# Patient Record
Sex: FEMALE | Race: WHITE | Employment: OTHER | URBAN - METROPOLITAN AREA
[De-identification: names, ages, dates, MRNs, and addresses within clinical notes are randomized per-mention and may not be internally consistent; named-entity substitution may affect disease eponyms.]

---

## 2017-01-06 ENCOUNTER — HOSPITAL ENCOUNTER (EMERGENCY)
Age: 82
Discharge: HOME | End: 2017-01-06
Attending: FAMILY MEDICINE

## 2017-01-06 VITALS
HEIGHT: 65 IN | OXYGEN SATURATION: 97 % | HEART RATE: 71 BPM | BODY MASS INDEX: 19.16 KG/M2 | SYSTOLIC BLOOD PRESSURE: 132 MMHG | TEMPERATURE: 98.2 F | RESPIRATION RATE: 20 BRPM | WEIGHT: 115 LBS | DIASTOLIC BLOOD PRESSURE: 63 MMHG

## 2017-01-06 DIAGNOSIS — H61.23 HEARING LOSS DUE TO CERUMEN IMPACTION, BILATERAL: Primary | ICD-10-CM

## 2017-01-06 NOTE — UC PROVIDER NOTE
Patient is a 80 y.o. female presenting with other event. The history is provided by a caregiver. Other   This is a new problem. The current episode started more than 2 days ago. The problem occurs constantly. The problem has not changed since onset. Pertinent negatives include no headaches. She has tried nothing for the symptoms. Past Medical History   Diagnosis Date    [facility] ([facility] [facility]     Diabetes mellitus ([facility] [facility]     Diabetic acidosis, type II ([facility] [address] [facility]     Hypertension     Hypothyroid     Hypothyroidism         Past Surgical History   Procedure Laterality Date    Hx appendectomy      Hx cholecystectomy      Hx hysterectomy           Family History   Problem Relation Age of Onset    Diabetes Father     Cancer Mother      ovarian         Social History     Social History    Marital status:      Spouse name: N/A    Number of children: N/A    Years of education: N/A     Occupational History    Not on file. Social History Main Topics    Smoking status: Never Smoker    Smokeless tobacco: Not on file    Alcohol use No    Drug use: No    Sexual activity: Not on file     Other Topics Concern    Not on file     Social History Narrative    ** Merged History Encounter **                     ALLERGIES: Demerol [meperidine]; Morphine; Penicillins; Demerol [meperidine]; Morphine; and Pcn [penicillins]    Review of Systems   Constitutional: Negative for activity change and chills. HENT: Negative for congestion. Neurological: Negative for headaches. Vitals:    01/06/17 1537   BP: 132/63   Pulse: 71   Resp: 20   Temp: 98.2 °F (36.8 °C)   SpO2: 97%   Weight: 52.2 kg (115 lb)   Height: 5' 5\" (1.651 m)       Physical Exam   Constitutional: She is oriented to person, place, and time. She appears well-developed and well-nourished. HENT:   Cerumen in canals B/L   Cardiovascular: Normal rate and regular rhythm.     Pulmonary/Chest: Effort normal.   Neurological: She is alert and oriented to person, place, and time. Skin: Skin is warm and dry. Psychiatric: She has a normal mood and affect. Her behavior is normal. Judgment and thought content normal.   Nursing note and vitals reviewed. MDM     Differential Diagnosis; Clinical Impression; Plan:     CLINICAL IMPRESSION:  Hearing loss due to cerumen impaction, bilateral  (primary encounter diagnosis)    Plan:  1. Follow up with ENT  2.   3.   Risk of Significant Complications, Morbidity, and/or Mortality:   Presenting problems: Moderate  Diagnostic procedures: Moderate  Management options:   Moderate  Progress:   Patient progress:  Stable      Procedures

## 2017-01-06 NOTE — DISCHARGE INSTRUCTIONS
Earwax Blockage: Care Instructions  Your Care Instructions    Earwax is a natural substance that protects the ear canal. Normally, earwax drains from the ears and does not cause problems. Sometimes earwax builds up and hardens. Earwax blockage (also called cerumen impaction) can cause some loss of hearing and pain. When wax is tightly packed, you will need to have your doctor remove it. Follow-up care is a key part of your treatment and safety. Be sure to make and go to all appointments, and call your doctor if you are having problems. Its also a good idea to know your test results and keep a list of the medicines you take. How can you care for yourself at home? · Do not try to remove earwax with cotton swabs, fingers, or other objects. This can make the blockage worse and damage the eardrum. · If your doctor recommends that you try to remove earwax at home:  ¨ Soften and loosen the earwax with warm mineral oil. You also can try hydrogen peroxide mixed with an equal amount of room temperature water. Place 2 drops of the fluid, warmed to body temperature, in the ear two times a day for up to 5 days. ¨ Once the wax is loose and soft, all that is usually needed to remove it from the ear canal is a gentle, warm shower. Direct the water into the ear, then tip your head to let the earwax drain out. Dry your ear thoroughly with a hair dryer set on low. Hold the dryer several inches from your ear. ¨ If the warm mineral oil and shower do not work, use an over-the-counter wax softener followed by gentle flushing with an ear syringe each night for a week or two. Make sure the flushing solution is body temperature. Cool or hot fluids in the ear can cause dizziness. When should you call for help? Call your doctor now or seek immediate medical care if:  · Pus or blood drains from your ear. · Your ears are ringing or feel full. · You have a loss of hearing.   Watch closely for changes in your health, and be sure to contact your doctor if:  · You have pain or reduced hearing after 1 week of home treatment. · You have any new symptoms, such as nausea or balance problems. Where can you learn more? Go to http://mimi-lupe.info/. Enter T380 in the search box to learn more about \"Earwax Blockage: Care Instructions. \"  Current as of: May 27, 2016  Content Version: 11.1  © 3554-9450 [employer]. Care instructions adapted under license by [employer] (which disclaims liability or warranty for this information). If you have questions about a medical condition or this instruction, always ask your healthcare professional. [address] any warranty or liability for your use of this information.

## 2017-05-28 ENCOUNTER — APPOINTMENT (OUTPATIENT)
Dept: CT IMAGING | Age: 82
End: 2017-05-28
Attending: EMERGENCY MEDICINE
Payer: MEDICARE

## 2017-05-28 ENCOUNTER — HOSPITAL ENCOUNTER (EMERGENCY)
Age: 82
Discharge: HOME | End: 2017-05-28
Attending: EMERGENCY MEDICINE
Payer: MEDICARE

## 2017-05-28 VITALS
RESPIRATION RATE: 12 BRPM | BODY MASS INDEX: 19.14 KG/M2 | SYSTOLIC BLOOD PRESSURE: 122 MMHG | HEIGHT: 65 IN | HEART RATE: 80 BPM | DIASTOLIC BLOOD PRESSURE: 70 MMHG | OXYGEN SATURATION: 98 % | WEIGHT: 114.86 LBS | TEMPERATURE: 97.7 F

## 2017-05-28 DIAGNOSIS — R19.7 DIARRHEA, UNSPECIFIED TYPE: ICD-10-CM

## 2017-05-28 DIAGNOSIS — K52.9 COLITIS: Primary | ICD-10-CM

## 2017-05-28 LAB
ALBUMIN SERPL BCP-MCNC: 3.5 G/DL (ref 3.5–5)
ALBUMIN/GLOB SERPL: 0.8 {RATIO} (ref 1.1–2.2)
ALP SERPL-CCNC: 84 U/L (ref 45–117)
ALT SERPL-CCNC: 21 U/L (ref 12–78)
ANION GAP BLD CALC-SCNC: 7 MMOL/L (ref 5–15)
APPEARANCE UR: CLEAR
AST SERPL W P-5'-P-CCNC: 29 U/L (ref 15–37)
BACTERIA URNS QL MICRO: NEGATIVE /HPF
BASOPHILS # BLD AUTO: 0 K/UL
BASOPHILS # BLD: 0 %
BILIRUB SERPL-MCNC: 0.5 MG/DL (ref 0.2–1)
BILIRUB UR QL: NEGATIVE
BUN SERPL-MCNC: 38 MG/DL (ref 6–20)
BUN/CREAT SERPL: 32 (ref 12–20)
CALCIUM SERPL-MCNC: 9.4 MG/DL (ref 8.5–10.1)
CHLORIDE SERPL-SCNC: 101 MMOL/L (ref 97–108)
CO2 SERPL-SCNC: 29 MMOL/L (ref 21–32)
COLOR UR: ABNORMAL
CREAT SERPL-MCNC: 1.19 MG/DL (ref 0.55–1.02)
DIFFERENTIAL METHOD BLD: ABNORMAL
EOSINOPHIL # BLD: 0 K/UL
EOSINOPHIL NFR BLD: 0 %
EPITH CASTS URNS QL MICRO: ABNORMAL /LPF
ERYTHROCYTE [DISTWIDTH] IN BLOOD BY AUTOMATED COUNT: 16 % (ref 11.5–14.5)
GLOBULIN SER CALC-MCNC: 4.5 G/DL (ref 2–4)
GLUCOSE SERPL-MCNC: 140 MG/DL (ref 65–100)
GLUCOSE UR STRIP.AUTO-MCNC: NEGATIVE MG/DL
GRAN CASTS URNS QL MICRO: ABNORMAL /LPF
HCT VFR BLD AUTO: 37.7 % (ref 35–47)
HGB BLD-MCNC: 12.3 G/DL (ref 11.5–16)
HGB UR QL STRIP: ABNORMAL
HYALINE CASTS URNS QL MICRO: ABNORMAL /LPF (ref 0–5)
KETONES UR QL STRIP.AUTO: NEGATIVE MG/DL
LEUKOCYTE ESTERASE UR QL STRIP.AUTO: NEGATIVE
LYMPHOCYTES # BLD AUTO: 7 %
LYMPHOCYTES # BLD: 1.1 K/UL
MAGNESIUM SERPL-MCNC: 2.3 MG/DL (ref 1.6–2.4)
MCH RBC QN AUTO: 26.6 PG (ref 26–34)
MCHC RBC AUTO-ENTMCNC: 32.6 G/DL (ref 30–36.5)
MCV RBC AUTO: 81.4 FL (ref 80–99)
MONOCYTES # BLD: 1.3 K/UL
MONOCYTES NFR BLD AUTO: 8 %
MUCOUS THREADS URNS QL MICRO: ABNORMAL /LPF
NEUTS SEG # BLD: 13.5 K/UL
NEUTS SEG NFR BLD AUTO: 85 %
NITRITE UR QL STRIP.AUTO: NEGATIVE
PH UR STRIP: 6 [PH] (ref 5–8)
PLATELET # BLD AUTO: 282 K/UL (ref 150–400)
POTASSIUM SERPL-SCNC: 4.7 MMOL/L (ref 3.5–5.1)
POTASSIUM SERPL-SCNC: 5.6 MMOL/L (ref 3.5–5.1)
PROT SERPL-MCNC: 8 G/DL (ref 6.4–8.2)
PROT UR STRIP-MCNC: ABNORMAL MG/DL
RBC # BLD AUTO: 4.63 M/UL (ref 3.8–5.2)
RBC #/AREA URNS HPF: ABNORMAL /HPF (ref 0–5)
RBC MORPH BLD: ABNORMAL
SODIUM SERPL-SCNC: 137 MMOL/L (ref 136–145)
SP GR UR REFRACTOMETRY: 1.01 (ref 1–1.03)
UA: UC IF INDICATED,UAUC: ABNORMAL
UROBILINOGEN UR QL STRIP.AUTO: 0.2 EU/DL (ref 0.2–1)
WBC # BLD AUTO: 15.9 K/UL (ref 3.6–11)
WBC URNS QL MICRO: ABNORMAL /HPF (ref 0–4)

## 2017-05-28 PROCEDURE — 81001 URINALYSIS AUTO W/SCOPE: CPT | Performed by: EMERGENCY MEDICINE

## 2017-05-28 PROCEDURE — 74177 CT ABD & PELVIS W/CONTRAST: CPT

## 2017-05-28 PROCEDURE — 96360 HYDRATION IV INFUSION INIT: CPT

## 2017-05-28 PROCEDURE — 99285 EMERGENCY DEPT VISIT HI MDM: CPT

## 2017-05-28 PROCEDURE — 83735 ASSAY OF MAGNESIUM: CPT | Performed by: EMERGENCY MEDICINE

## 2017-05-28 PROCEDURE — 84132 ASSAY OF SERUM POTASSIUM: CPT | Performed by: EMERGENCY MEDICINE

## 2017-05-28 PROCEDURE — 36415 COLL VENOUS BLD VENIPUNCTURE: CPT | Performed by: EMERGENCY MEDICINE

## 2017-05-28 PROCEDURE — 74011636320 HC RX REV CODE- 636/320: Performed by: EMERGENCY MEDICINE

## 2017-05-28 PROCEDURE — 74011250636 HC RX REV CODE- 250/636: Performed by: EMERGENCY MEDICINE

## 2017-05-28 PROCEDURE — 85025 COMPLETE CBC W/AUTO DIFF WBC: CPT | Performed by: EMERGENCY MEDICINE

## 2017-05-28 PROCEDURE — 80053 COMPREHEN METABOLIC PANEL: CPT | Performed by: EMERGENCY MEDICINE

## 2017-05-28 RX ORDER — METRONIDAZOLE 500 MG/1
500 TABLET ORAL 3 TIMES DAILY
Qty: 30 TAB | Refills: 0 | Status: SHIPPED | OUTPATIENT
Start: 2017-05-28 | End: 2017-06-07

## 2017-05-28 RX ORDER — LEVOFLOXACIN 750 MG/1
750 TABLET ORAL DAILY
Qty: 10 TAB | Refills: 0 | Status: SHIPPED | OUTPATIENT
Start: 2017-05-28 | End: 2017-06-07

## 2017-05-28 RX ORDER — SODIUM CHLORIDE 9 MG/ML
50 INJECTION, SOLUTION INTRAVENOUS
Status: COMPLETED | OUTPATIENT
Start: 2017-05-28 | End: 2017-05-28

## 2017-05-28 RX ORDER — SODIUM CHLORIDE 0.9 % (FLUSH) 0.9 %
10 SYRINGE (ML) INJECTION
Status: COMPLETED | OUTPATIENT
Start: 2017-05-28 | End: 2017-05-28

## 2017-05-28 RX ADMIN — SODIUM CHLORIDE 50 ML/HR: 900 INJECTION, SOLUTION INTRAVENOUS at 17:32

## 2017-05-28 RX ADMIN — Medication 10 ML: at 17:32

## 2017-05-28 RX ADMIN — IOPAMIDOL 70 ML: 755 INJECTION, SOLUTION INTRAVENOUS at 17:32

## 2017-05-28 NOTE — ED NOTES
Bedside shift change report given to RN Parisa Velásquez (oncoming nurse) by JEANNETTE Francisco and Pietro Altamirano (offgoing nurse). Report included the following information SBAR, ED Summary and MAR.

## 2017-05-28 NOTE — ED NOTES
TRANSFER - OUT REPORT:    Verbal report given to Nataly(name) on Raven Zuluaga  being transferred to [facility](unit) for routine progression of care -discharge. Report consisted of patients Situation, Background, Assessment and   Recommendations(SBAR). Information from the following report(s) SBAR was reviewed with the receiving nurse. Lines:       Opportunity for questions and clarification was provided.       Patient transported with:   [facility] staff

## 2017-05-28 NOTE — ED PROVIDER NOTES
HPI Comments: Gila Macias is a 80 y.o. F with PMHx significant for Dementia / Hypothyroid / DM / HTN / Diabetic acidosis who presents via EMS from [facility] in [place], [place] [id number] [address] [place] c/o new onset diarrhea x this morning. Patient is at baseline mental status. Per EMS, living facility staff were not helpful in providing patient history. Pt denies any vomiting, fever, chills, chest pain, abdominal pain or any additional pain. Limited history due to dementia. PCP: Tanner Crum MD    Patient's history is limited secondary to hx of Dementia. The history is provided by the patient and the EMS personnel. The history is limited by the condition of the patient. Past Medical History:   Diagnosis Date    Diabetes ([facility] [address] [facility]     Diabetes mellitus ([facility] [address] [facility]     Diabetic acidosis, type II ([facility] [address] [facility]     Hypertension     Hypothyroid     Hypothyroidism        Past Surgical History:   Procedure Laterality Date    HX APPENDECTOMY      HX CHOLECYSTECTOMY      HX HYSTERECTOMY           Family History:   Problem Relation Age of Onset    Diabetes Father     Cancer Mother      ovarian        Social History     Social History    Marital status:      Spouse name: N/A    Number of children: N/A    Years of education: N/A     Occupational History    Not on file. Social History Main Topics    Smoking status: Never Smoker    Smokeless tobacco: Not on file    Alcohol use No    Drug use: No    Sexual activity: Not on file     Other Topics Concern    Not on file     Social History Narrative    ** Merged History Encounter **              ALLERGIES: Demerol [meperidine]; Morphine; Penicillins; Demerol [meperidine];  Morphine; and Pcn [penicillins]    Review of Systems   Unable to perform ROS: Dementia       Vitals:    05/28/17 1343 05/28/17 1400 05/28/17 1733   BP: 109/54 (!) 118/36 136/51   Pulse: 67 67 69   Resp: 16 17 18   Temp: 97.7 °F (36.5 °C)     SpO2: 100% 95% 99%   Weight: 52.1 kg (114 lb 13.8 oz) Height: 5' 5\" (1.651 m)              Physical Exam   Constitutional: She appears well-developed and well-nourished. No distress. HENT:   Head: Normocephalic and atraumatic. Eyes: EOM are normal. Right eye exhibits no discharge. Left eye exhibits no discharge. No scleral icterus. Neck: Normal range of motion. Neck supple. No tracheal deviation present. Cardiovascular: Normal rate, regular rhythm, normal heart sounds and intact distal pulses. Exam reveals no gallop and no friction rub. No murmur heard. Pulmonary/Chest: Effort normal and breath sounds normal. No respiratory distress. She has no wheezes. She has no rales. Abdominal: Soft. She exhibits no distension. There is no tenderness. Musculoskeletal: Normal range of motion. She exhibits no edema. Lymphadenopathy:     She has no cervical adenopathy. Neurological: She is alert. Oriented to self and place, pt thinks it is spring  Demented  Face is symmetric, moving extremities equally   Skin: Skin is warm and dry. No rash noted. Psychiatric: She has a normal mood and affect. Nursing note and vitals reviewed. MDM  Number of Diagnoses or Management Options  Colitis:   Diarrhea, unspecified type:   Diagnosis management comments:     Patient presents to ED with diarrhea. She is asymptomatic on exam but is demented. Differential includes viral syndrome, electrolyte abnormality, dehydration, c diff, colitis. Will check CBC, CMP, Mg and UA. Will send c diff if patient is able to provide sample. Amount and/or Complexity of Data Reviewed  Clinical lab tests: ordered and reviewed  Obtain history from someone other than the patient: yes (EMS)  Review and summarize past medical records: yes    Patient Progress  Patient progress: stable    ED Course       Procedures     PROGRESS NOTE:  3:39 PM  Spoke with nursing staff at [facility] in [place], [place].  Nurse reports that patients BP was in the 90's, Blood glucose was 218 and patient had a BM with mucus and spots of blood in it. WBC is 15.9 but patient is afebrile, will obtain CT a/p. Written by Concepción Quiroz. Lori Gaitan, ED Scribe, as dictated by Krupa Wallace MD    Procedure Note - Rectal Exam:   3:45 PM  Performed by: Krupa Wallace MD  Chaperoned by: Femi Crespo RN  Rectal exam performed. Brown stool was collected. No blood was found in stool. Stool was Hemoccult tested, and found to be heme Negative. The procedure took 1-15 minutes, and pt tolerated well. Written by Concepción Quiroz. Lori Gaitan, ED Scribe, as dictated by Krupa Wallace MD.    PROGRESS NOTE:  6:25 PM  Spoke with nursing staff at [facility], patient is not currently on Coumadin or Aricept. Written by Concepción Quiroz. Lori Gaitan, ED Scribe, as dictated by Krupa Wallace MD    PROGRESS NOTE:  6:30 PM  Labs unremarkable with exception of leukocytosis. CT a/p consistent with colitis. Patient is afebrile, appears well and is tolerating po. She has not had any diarrhea while in ED. Will discharge with outpatient management. Arleen Pineda MD    Patient Vitals for the past 12 hrs:   Temp Pulse Resp BP SpO2   05/28/17 1733 - 69 18 136/51 99 %   05/28/17 1400 - 67 17 (!) 118/36 95 %   05/28/17 1343 97.7 °F (36.5 °C) 67 16 109/54 100 %       LABORATORY TESTS:  Recent Results (from the past 12 hour(s))   CBC WITH AUTOMATED DIFF    Collection Time: 05/28/17  2:20 PM   Result Value Ref Range    WBC 15.9 (H) 3.6 - 11.0 K/uL    RBC 4.63 3.80 - 5.20 M/uL    HGB 12.3 11.5 - 16.0 g/dL    HCT 37.7 35.0 - 47.0 %    MCV 81.4 80.0 - 99.0 FL    MCH 26.6 26.0 - 34.0 PG    MCHC 32.6 30.0 - 36.5 g/dL    RDW 16.0 (H) 11.5 - 14.5 %    PLATELET 362 096 - 049 K/uL    NEUTROPHILS 85 %    LYMPHOCYTES 7 %    MONOCYTES 8 %    EOSINOPHILS 0 %    BASOPHILS 0 %    ABS. NEUTROPHILS 13.5 K/UL    ABS. LYMPHOCYTES 1.1 K/UL    ABS. MONOCYTES 1.3 K/UL    ABS. EOSINOPHILS 0.0 K/UL    ABS.  BASOPHILS 0.0 K/UL    RBC COMMENTS ANISOCYTOSIS  1+        DF MANUAL     METABOLIC PANEL, COMPREHENSIVE    Collection Time: 05/28/17  2:20 PM   Result Value Ref Range    Sodium 137 136 - 145 mmol/L    Potassium 5.6 (H) 3.5 - 5.1 mmol/L    Chloride 101 97 - 108 mmol/L    CO2 29 21 - 32 mmol/L    Anion gap 7 5 - 15 mmol/L    Glucose 140 (H) 65 - 100 mg/dL    BUN 38 (H) 6 - 20 MG/DL    Creatinine 1.19 (H) 0.55 - 1.02 MG/DL    BUN/Creatinine ratio 32 (H) 12 - 20      GFR est AA 51 (L) >60 ml/min/1.73m2    GFR est non-AA 42 (L) >60 ml/min/1.73m2    Calcium 9.4 8.5 - 10.1 MG/DL    Bilirubin, total 0.5 0.2 - 1.0 MG/DL    ALT (SGPT) 21 12 - 78 U/L    AST (SGOT) 29 15 - 37 U/L    Alk. phosphatase 84 45 - 117 U/L    Protein, total 8.0 6.4 - 8.2 g/dL    Albumin 3.5 3.5 - 5.0 g/dL    Globulin 4.5 (H) 2.0 - 4.0 g/dL    A-G Ratio 0.8 (L) 1.1 - 2.2     URINALYSIS W/ REFLEX CULTURE    Collection Time: 05/28/17  2:20 PM   Result Value Ref Range    Color YELLOW/STRAW      Appearance CLEAR CLEAR      Specific gravity 1.015 1.003 - 1.030      pH (UA) 6.0 5.0 - 8.0      Protein TRACE (A) NEG mg/dL    Glucose NEGATIVE  NEG mg/dL    Ketone NEGATIVE  NEG mg/dL    Bilirubin NEGATIVE  NEG      Blood TRACE (A) NEG      Urobilinogen 0.2 0.2 - 1.0 EU/dL    Nitrites NEGATIVE  NEG      Leukocyte Esterase NEGATIVE  NEG      WBC 0-4 0 - 4 /hpf    RBC 5-10 0 - 5 /hpf    Epithelial cells MODERATE (A) FEW /lpf    Bacteria NEGATIVE  NEG /hpf    UA:UC IF INDICATED CULTURE NOT INDICATED BY UA RESULT CNI      Mucus 1+ (A) NEG /lpf    Hyaline cast 5-10 0 - 5 /lpf    Granular cast 2-5 (A) NEG /lpf   MAGNESIUM    Collection Time: 05/28/17  2:20 PM   Result Value Ref Range    Magnesium 2.3 1.6 - 2.4 mg/dL   POTASSIUM    Collection Time: 05/28/17  3:49 PM   Result Value Ref Range    Potassium 4.7 3.5 - 5.1 mmol/L       IMAGING RESULTS:  CT ABD PELV W CONT   Final Result   EXAM: CT ABDOMEN PELVIS WITH CONTRAST  INDICATION: diarrhea with mucous, WBC 16  COMPARISON: CT of the chest, 1/25/2016. Limitations:  Motion limited evaluation  . TECHNIQUE:   Multislice helical CT was performed from the diaphragm to the symphysis pubis  with oral and intravenous contrast administration. Contiguous 5 mm axial images  were reconstructed and lung and soft tissue windows were generated. Coronal and  sagittal reformations were generated. CT dose reduction was achieved through use of a standardized protocol tailored  for this examination and automatic exposure control for dose modulation. Shelvy Setting FINDINGS:  INCIDENTALLY IMAGED CHEST:  Heart/vessels: Mitral annulus calcification. Calcifications in the coronary  arteries. Lungs/Pleura: Bibasilar opacity suggesting scarring/atelectasis. .  ABDOMEN:  Liver: Within normal limits. Gallbladder/Biliary: Status post cholecystectomy. Spleen: Within normal limits. Pancreas: Within normal limits. Adrenals: Left adrenal gland thickening, unchanged. Kidneys: Within normal limits. Peritoneum/Mesenteries: Within normal limits. Extraperitoneum: Within normal limits. Gastrointestinal tract: Diffuse mural thickening throughout the descending  colon. Diverticulosis in the sigmoid colon  Vascular: Calcifications in the aorta and major branch vessels. Shelvy Setting PELVIS:  Extraperitoneum: Calcifications in the pelvis suggesting phleboliths. Ureters: Within normal limits. Bladder: There is gas within the bladder. Reproductive System: Within normal limits. .  MSK:   Suture material in the right upper quadrant, anterior abdominal wall. Degenerative changes throughout the spine. Superior endplate compression of L3  . IMPRESSION  IMPRESSION: Motion limited evaluation  1. Diffuse mural thickening throughout the descending colon suggesting a  nonspecific colitis. 2. Gas within the bladder of uncertain significance, possibly iatrogenic. Recommend clinical correlation for cystitis. 3. There is compression in the superior endplate of L3 age-indeterminate.   4. Other incidental findings as above       MEDICATIONS GIVEN:  Medications   sodium chloride 0.9 % bolus infusion 1,000 mL (1,000 mL IntraVENous Incomplete 5/28/17 1609)   0.9% sodium chloride infusion (50 mL/hr IntraVENous New Bag 5/28/17 1732)   iopamidol (ISOVUE-370) 76 % injection 100 mL (70 mL IntraVENous Given 5/28/17 1732)   sodium chloride (NS) flush 10 mL (10 mL IntraVENous Given 5/28/17 1732)       IMPRESSION:  1. Colitis    2. Diarrhea, unspecified type        PLAN:  1. Current Discharge Medication List      START taking these medications    Details   levoFLOXacin (LEVAQUIN) 750 mg tablet Take 1 Tab by mouth daily for 10 days. Qty: 10 Tab, Refills: 0      metroNIDAZOLE (FLAGYL) 500 mg tablet Take 1 Tab by mouth three (3) times daily for 10 days. Qty: 30 Tab, Refills: 0           2. Follow-up Information     Follow up With Details Comments Deepak uRtherford MD In 3 days  [address] [place] [address]   [address] [place] [address]  [phone]      [facility] EMERGENCY DEPT  As needed, If symptoms worsen [address] [place], [place] [address] [facility]  [phone]        Return to ED if worse     DISCHARGE NOTE:  6:31 PM  The patient's results have been reviewed with family and/or caregiver. They verbally convey their understanding and agreement of the patient's signs, symptoms, diagnosis, treatment, and prognosis. They additionally agree to follow up as recommended in the discharge instructions or to return to the Emergency Room should the patient's condition change prior to their follow-up appointment. The family and/or caregiver verbally agrees with the care-plan and all of their questions have been answered. The discharge instructions have also been provided to the them along with educational information regarding the patient's diagnosis and a list of reasons why the patient would want to return to the ER prior to their follow-up appointment should their condition change. Written by Ju Guerin.  Lance Dye ED Scribe, as dictated by Billy Zuniga MD.        Teresa Lopez: This note is prepared by Ave Dias, acting as Scribe for Lu Armstrong MD.    Lu Armstrong MD: The scribe's documentation has been prepared under my direction and personally reviewed by me in its entirety. I confirm that the note above accurately reflects all work, treatment, procedures, and medical decision making performed by me.

## 2017-05-28 NOTE — ED NOTES
Assumed care of pt at 1520. Pt resting on stretcher in a position of comfort. Call bell within reach. ED tech at bedside for 1:1 due to pt's repeated attempts to get out of bed. Updated on plan of care.

## 2017-05-28 NOTE — DISCHARGE INSTRUCTIONS
Colitis: Care Instructions  Your Care Instructions  Colitis is the medical term for swelling (inflammation) of the intestine. It can be caused by different things, such as an infection or loss of blood flow in the intestine. Other causes are problems like Crohn's disease or ulcerative colitis. Symptoms may include fever, diarrhea that may be bloody, or belly pain. Sometimes symptoms go away without treatment. But you may need treatment or more tests, such as blood tests or a stool test. Or you may need imaging tests like a CT scan or a colonoscopy. In some cases, the doctor may want to test a sample of tissue from the intestine. This test is called a biopsy. The doctor has checked you carefully, but problems can develop later. If you notice any problems or new symptoms, get medical treatment right away. Follow-up care is a key part of your treatment and safety. Be sure to make and go to all appointments, and call your doctor if you are having problems. It's also a good idea to know your test results and keep a list of the medicines you take. How can you care for yourself at home? · Rest until you feel better. · Your doctor may recommend that you eat bland foods. These include rice, dry toast or crackers, bananas, and applesauce. · To prevent dehydration, drink plenty of fluids. Choose water and other caffeine-free clear liquids until you feel better. If you have kidney, heart, or liver disease and have to limit fluids, talk with your doctor before you increase the amount of fluids you drink. · Be safe with medicines. Take your medicines exactly as prescribed. Call your doctor if you think you are having a problem with your medicine. You will get more details on the specific medicines your doctor prescribes. When should you call for help? Call 911 anytime you think you may need emergency care. For example, call if:  · You passed out (lost consciousness).   · You vomit blood or what looks like coffee grounds. · Your stools are maroon or very bloody. Call your doctor now or seek immediate medical care if:  · You have new or worse pain. · You have a new or higher fever. · You have new or worse symptoms. · You cannot keep fluids or medicines down. Watch closely for changes in your health, and be sure to contact your doctor if:  · You do not get better as expected. Where can you learn more? Go to [employer]  Enter [id number] in the search box to learn more about \"Colitis: Care Instructions. \"   © 7353-9104 [employer], [employer]. Care instructions adapted under license by [facility] (which disclaims liability or warranty for this information). This care instruction is for use with your licensed healthcare professional. If you have questions about a medical condition or this instruction, always ask your healthcare professional. [address] any warranty or liability for your use of this information. Content Version: 14.4.500837; Current as of: November 20, 2015             Diarrhea: Care Instructions  Your Care Instructions    Diarrhea is loose, watery stools (bowel movements). The exact cause is often hard to find. Sometimes diarrhea is your body's way of getting rid of what caused an upset stomach. Viruses, food poisoning, and many medicines can cause diarrhea. Some people get diarrhea in response to emotional stress, anxiety, or certain foods. Almost everyone has diarrhea now and then. It usually isn't serious, and your stools will return to normal soon. The important thing to do is replace the fluids you have lost, so you can prevent dehydration. The doctor has checked you carefully, but problems can develop later. If you notice any problems or new symptoms, get medical treatment right away. Follow-up care is a key part of your treatment and safety. Be sure to make and go to all appointments, and call your doctor if you are having problems.  It's also a good idea to know your test results and keep a list of the medicines you take. How can you care for yourself at home? · Watch for signs of dehydration, which means your body has lost too much water. Dehydration is a serious condition and should be treated right away. Signs of dehydration are:  ¨ Increasing thirst and dry eyes and mouth. ¨ Feeling faint or lightheaded. ¨ Darker urine, and a smaller amount of urine than normal.  · To prevent dehydration, drink plenty of fluids, enough so that your urine is light yellow or clear like water. Choose water and other caffeine-free clear liquids until you feel better. If you have kidney, heart, or liver disease and have to limit fluids, talk with your doctor before you increase the amount of fluids you drink. · Begin eating small amounts of mild foods the next day, if you feel like it. ¨ Try yogurt that has live cultures of Lactobacillus. (Check the label.)  ¨ Avoid spicy foods, fruits, alcohol, and caffeine until 48 hours after all symptoms are gone. ¨ Avoid chewing gum that contains sorbitol. ¨ Avoid dairy products (except for yogurt with Lactobacillus) while you have diarrhea and for 3 days after symptoms are gone. · The doctor may recommend that you take over-the-counter medicine, such as loperamide (Imodium), if you still have diarrhea after 6 hours. Read and follow all instructions on the label. Do not use this medicine if you have bloody diarrhea, a high fever, or other signs of serious illness. Call your doctor if you think you are having a problem with your medicine. When should you call for help? Call 911 anytime you think you may need emergency care. For example, call if:  · You passed out (lost consciousness). · Your stools are maroon or very bloody. Call your doctor now or seek immediate medical care if:  · You are dizzy or lightheaded, or you feel like you may faint. · Your stools are black and look like tar, or they have streaks of blood.   · You have new or worse belly pain. · You have symptoms of dehydration, such as:  ¨ Dry eyes and a dry mouth. ¨ Passing only a little dark urine. ¨ Feeling thirstier than usual.  · You have a new or higher fever. Watch closely for changes in your health, and be sure to contact your doctor if:  · Your diarrhea is getting worse. · You see pus in the diarrhea. · You are not getting better after 2 days (48 hours). Where can you learn more? Go to http://mimi-lupe.info/. Enter W455 in the search box to learn more about \"Diarrhea: Care Instructions. \"  Current as of: May 27, 2016  Content Version: 11.2  © 6994-4296 [employer]. Care instructions adapted under license by [employer] (which disclaims liability or warranty for this information). If you have questions about a medical condition or this instruction, always ask your healthcare professional. [address] any warranty or liability for your use of this information.

## 2017-05-28 NOTE — ED NOTES
Patient arrived via EMS from assisted living for diarrhea onset this morning. Warm blanket and pillow given.  Call bell within reach

## 2017-05-28 NOTE — ED NOTES
Bedside and Verbal shift change report given to Andres Wolfe (oncoming nurse) by Cesar Monterroso (offgoing nurse). Report included the following information SBAR.

## 2017-05-29 NOTE — ED NOTES
AMS here to transport patient to nursing home. Report given to EMT at this time. Pt verbally discharged by MD Janet Falk to nursing home.

## 2017-08-25 ENCOUNTER — APPOINTMENT (OUTPATIENT)
Dept: GENERAL RADIOLOGY | Age: 82
End: 2017-08-25
Attending: PHYSICIAN ASSISTANT
Payer: MEDICARE

## 2017-08-25 ENCOUNTER — HOSPITAL ENCOUNTER (EMERGENCY)
Age: 82
Discharge: HOME | End: 2017-08-25
Attending: EMERGENCY MEDICINE | Admitting: EMERGENCY MEDICINE
Payer: MEDICARE

## 2017-08-25 ENCOUNTER — APPOINTMENT (OUTPATIENT)
Dept: GENERAL RADIOLOGY | Age: 82
End: 2017-08-25
Attending: EMERGENCY MEDICINE
Payer: MEDICARE

## 2017-08-25 VITALS
OXYGEN SATURATION: 92 % | RESPIRATION RATE: 18 BRPM | HEART RATE: 69 BPM | TEMPERATURE: 97.6 F | DIASTOLIC BLOOD PRESSURE: 50 MMHG | SYSTOLIC BLOOD PRESSURE: 113 MMHG

## 2017-08-25 DIAGNOSIS — S82.841A BIMALLEOLAR ANKLE FRACTURE, RIGHT, CLOSED, INITIAL ENCOUNTER: Primary | ICD-10-CM

## 2017-08-25 PROBLEM — S82.891A CLOSED RIGHT ANKLE FRACTURE: Status: ACTIVE | Noted: 2017-08-25

## 2017-08-25 LAB
GLUCOSE BLD STRIP.AUTO-MCNC: 207 MG/DL (ref 65–100)
SERVICE CMNT-IMP: ABNORMAL

## 2017-08-25 PROCEDURE — 82962 GLUCOSE BLOOD TEST: CPT

## 2017-08-25 PROCEDURE — 75810000053 HC SPLINT APPLICATION

## 2017-08-25 PROCEDURE — 73590 X-RAY EXAM OF LOWER LEG: CPT

## 2017-08-25 PROCEDURE — 99285 EMERGENCY DEPT VISIT HI MDM: CPT

## 2017-08-25 PROCEDURE — 73630 X-RAY EXAM OF FOOT: CPT

## 2017-08-25 PROCEDURE — 73610 X-RAY EXAM OF ANKLE: CPT

## 2017-08-25 PROCEDURE — 73600 X-RAY EXAM OF ANKLE: CPT

## 2017-08-25 PROCEDURE — 74011250637 HC RX REV CODE- 250/637: Performed by: EMERGENCY MEDICINE

## 2017-08-25 RX ORDER — HYDROCODONE BITARTRATE AND ACETAMINOPHEN 5; 325 MG/1; MG/1
1 TABLET ORAL
Status: COMPLETED | OUTPATIENT
Start: 2017-08-25 | End: 2017-08-25

## 2017-08-25 RX ORDER — HYDROCODONE BITARTRATE AND ACETAMINOPHEN 5; 325 MG/1; MG/1
1 TABLET ORAL
Qty: 20 TAB | Refills: 0 | Status: ON HOLD | OUTPATIENT
Start: 2017-08-25 | End: 2017-09-06

## 2017-08-25 RX ORDER — METFORMIN HYDROCHLORIDE 500 MG/1
500 TABLET ORAL DAILY
COMMUNITY
End: 2017-10-27

## 2017-08-25 RX ORDER — MIRTAZAPINE 15 MG/1
15 TABLET, FILM COATED ORAL
COMMUNITY

## 2017-08-25 RX ADMIN — HYDROCODONE BITARTRATE AND ACETAMINOPHEN 1 TABLET: 5; 325 TABLET ORAL at 15:20

## 2017-08-25 NOTE — CONSULTS
Orthopedic CONSULT NOTE    Subjective:     Date of Consultation:  August 25, 2017      Raven Aguayo is a 80 y.o. female who is being seen for right ankle pain after fall . Injury occurred  Today  while Pt. was at home . Workup has revealed ankle fracture non displaced . Patient's ambulatory status includes Walkers, Type: Rolling Walker and their living environment is [facility]. Pt. Denies head trauma/LOC during injury. Patient Active Problem List    Diagnosis Date Noted    Closed right ankle fracture 08/25/2017    Hypoxemia 01/25/2016    Closed olecranon fracture 01/25/2016    UTI (lower urinary tract infection) 01/25/2016    Dementia 01/25/2016    Fall 09/15/2012    Hyponatremia 09/15/2012    Diabetes mellitus ([facility] [address] 09/15/2012     Family History   Problem Relation Age of Onset    Diabetes Father     Cancer Mother      ovarian       Social History   Substance Use Topics    Smoking status: Never Smoker    Smokeless tobacco: Never Used    Alcohol use No     Past Medical History:   Diagnosis Date    Diabetes ([address]     Diabetes mellitus ([facility] [address]     Diabetic acidosis, type II ([address]     Hypertension     Hypothyroid     Hypothyroidism       Past Surgical History:   Procedure Laterality Date    HX APPENDECTOMY      HX CHOLECYSTECTOMY      HX HYSTERECTOMY        Prior to Admission medications    Medication Sig Start Date End Date Taking? Authorizing Provider   mirtazapine (REMERON) 15 mg tablet Take 15 mg by mouth nightly. Yes Suyapa Hung MD   metFORMIN (GLUCOPHAGE) 500 mg tablet Take 500 mg by mouth two (2) times daily (with meals). Yes Suyapa Hung MD   HYDROcodone-acetaminophen (NORCO) 5-325 mg per tablet Take 1 Tab by mouth every eight (8) hours as needed for Pain. Max Daily Amount: 3 Tabs. 8/25/17  Yes Han Dumont MD   amLODIPine (NORVASC) 10 mg tablet Take 10 mg by mouth daily. Yes Historical Provider   lisinopril (PRINIVIL, ZESTRIL) 10 mg tablet Take 10 mg by mouth daily.    Yes Historical Provider   memantine (NAMENDA) 10 mg tablet Take 10 mg by mouth two (2) times a day. Yes Historical Provider   multivitamin (ONE A DAY) tablet Take 1 Tab by mouth daily. Yes Historical Provider   aspirin 81 mg chewable tablet Take 1 Tab by mouth daily. 9/19/12  Yes Dawood Machado MD   levothyroxine (SYNTHROID) 50 mcg tablet Take 50 mcg by mouth Daily (before breakfast). Yes Suyapa Hung MD   cyanocobalamin (VITAMIN B-12) 1,000 mcg tablet Take 1,000 mcg by mouth every other day. Yes Suyapa Hung MD   polyethylene glycol (MIRALAX) 17 gram packet Take 17 g by mouth as needed. Yes Suyapa Hung MD   docusate sodium (COLACE) 100 mg capsule Take 100 mg by mouth every other day. Historical Provider     No current facility-administered medications for this encounter. Current Outpatient Prescriptions   Medication Sig    mirtazapine (REMERON) 15 mg tablet Take 15 mg by mouth nightly.  metFORMIN (GLUCOPHAGE) 500 mg tablet Take 500 mg by mouth two (2) times daily (with meals).  HYDROcodone-acetaminophen (NORCO) 5-325 mg per tablet Take 1 Tab by mouth every eight (8) hours as needed for Pain. Max Daily Amount: 3 Tabs.  amLODIPine (NORVASC) 10 mg tablet Take 10 mg by mouth daily.  lisinopril (PRINIVIL, ZESTRIL) 10 mg tablet Take 10 mg by mouth daily.  memantine (NAMENDA) 10 mg tablet Take 10 mg by mouth two (2) times a day.  multivitamin (ONE A DAY) tablet Take 1 Tab by mouth daily.  aspirin 81 mg chewable tablet Take 1 Tab by mouth daily.  levothyroxine (SYNTHROID) 50 mcg tablet Take 50 mcg by mouth Daily (before breakfast).  cyanocobalamin (VITAMIN B-12) 1,000 mcg tablet Take 1,000 mcg by mouth every other day.  polyethylene glycol (MIRALAX) 17 gram packet Take 17 g by mouth as needed.  docusate sodium (COLACE) 100 mg capsule Take 100 mg by mouth every other day.       Allergies   Allergen Reactions    Demerol [Meperidine] Unknown (comments)    Morphine Unknown (comments)    Penicillins Unknown (comments)    Demerol [Meperidine] Other (comments)     Unsure of reaction    Morphine Other (comments)     Unsure of reaction    Pcn [Penicillins] Other (comments)     unknown        Review of Systems:  A comprehensive review of systems was negative except for that written in the HPI. Mental Status: no dementia    Objective:     Patient Vitals for the past 8 hrs:   BP Temp Pulse Resp SpO2   17 1915 113/50 - 69 18 92 %   17 1900 127/61 - 74 19 91 %   17 1845 118/55 - 67 14 93 %   17 1830 135/59 - 65 19 92 %   17 1815 148/56 - 70 19 92 %   17 1807 135/56 - 66 17 91 %   17 1656 - - - - 94 %   17 1615 122/53 - 63 12 95 %   17 1600 131/64 - 71 21 97 %   17 1545 121/51 - 65 20 94 %   17 1530 120/44 - 64 20 95 %   17 1515 (!) 104/35 - 63 19 93 %   17 1500 108/47 - 64 18 95 %   17 1449 116/42 97.6 °F (36.4 °C) 61 12 94 %   17 1445 116/42 - - - 94 %   17 1440 109/44 - - - 94 %     Temp (24hrs), Av.6 °F (36.4 °C), Min:97.6 °F (36.4 °C), Max:97.6 °F (36.4 °C)      EXAM: alert, cooperative, no distress, oriented, normal,  Pt. Is TTP over right ankle with mild swelling. Warm well perfused. No wounds or deformity . Imaging Review: XRINDICATION:  Additional history: Ground level fall, unwitnessed. Patient complains of right  ankle and knee pain. COMPARISON: X-ray of the right tibia and fibula, 2012  . FINDINGS:  TIBIA and FIBULA: 2 views  The proximal tibia and fibula are unremarkable. There is a fracture through the  medial malleolus. There is a fracture through the distal fibular metadiaphysis. Osteopenia. [place] ANKLE: 3 views  Fracture of the medial and lateral malleoli, not significantly displaced. The  ankle joint appears congruent osteopenia. There is associated soft tissue  swelling about the ankle. [place] FOOT: 3 views  Osteopenia.  Degenerative changes in the midfoot. Hammertoes. .  IMPRESSION  IMPRESSION:  1. Fractures of the medial and lateral malleoli. The ankle joint appears  congruent. Labs:   Recent Results (from the past 24 hour(s))   GLUCOSE, POC    Collection Time: 08/25/17  4:10 PM   Result Value Ref Range    Glucose (POC) 207 (H) 65 - 100 mg/dL    Performed by Yue Finn          Impression:     Patient Active Problem List    Diagnosis Date Noted    Closed right ankle fracture 08/25/2017    Hypoxemia 01/25/2016    Closed olecranon fracture 01/25/2016    UTI (lower urinary tract infection) 01/25/2016    Dementia 01/25/2016    Fall 09/15/2012    Hyponatremia 09/15/2012    Diabetes mellitus ([facility] [address] 09/15/2012     Principal Problem:    Closed right ankle fracture (8/25/2017)        Plan:   Pt. Stable  Non-Operative Management at this time. Pt. Immobilized with sugar tong and posterior splint. Non weight bear RLEwith walker. Pain meds per primary care team.  Pt. To be  discharged to home. F/u with Dr. Roberson within 1 week by calling for an appointment at [phone]. Dr. Roberson aware and agree with above plan.       [place], [place]

## 2017-08-25 NOTE — ED NOTES
Patient arrived via ems. Was brought in from assisted living home for unwitnessed glf. C/o right ankle and knee pain. Right ankle is swollen +2 non pitting edema. Does not remember details of fall, hx of dementia. No other obvious signs of trauma.

## 2017-08-25 NOTE — ED PROVIDER NOTES
HPI Comments: Raven Perez, 80 y.o. Female with PMHx of HTN, DM and hypothyroidism presents via EMS to the ED with cc of R foot, ankle and knee pain after unwitnessed GLF PTA. She now reports right foot, right ankle and right knee pain. Pt does not remember the fall. Daughter notes that the pt was in her room and was found down on the floor. Daughter notes that the pt did not mention any head injuries and that she was thoroughly looked over by EMS to make sure. Daughter notes that she had multiple GLF's in the past with some linked to TIA's. She takes ASA 81 mg and metformin daily. Daughter notes that the pt has lost weight over the last year with no change in weakness. Daughter reports morphine, penicillin and Demerol allergies. Pt is ambulatory with a walker at baseline. She denies any hip pain, shoulder pain, LLE pain, or HA. PCP: PROVIDER UNKNOWN    Social history significant for: - Tobacco, - EtOH, - Illicit Drug Use    There are no other complaints, changes, or physical findings at this time. Written by HERNESTO Locke, as dictated by Ghulam Lau MD.      The history is provided by the patient. No  was used. Past Medical History:   Diagnosis Date    Diabetes ([address]     Diabetes mellitus ([address]     Diabetic acidosis, type II ([address]     Hypertension     Hypothyroid     Hypothyroidism        Past Surgical History:   Procedure Laterality Date    HX APPENDECTOMY      HX CHOLECYSTECTOMY      HX HYSTERECTOMY           Family History:   Problem Relation Age of Onset    Diabetes Father     Cancer Mother      ovarian        Social History     Social History    Marital status:      Spouse name: N/A    Number of children: N/A    Years of education: N/A     Occupational History    Not on file.      Social History Main Topics    Smoking status: Never Smoker    Smokeless tobacco: Never Used    Alcohol use No    Drug use: No    Sexual activity: Not on file     Other Topics Concern    Not on file     Social History Narrative    ** Merged History Encounter **              ALLERGIES: Demerol [meperidine]; Morphine; Penicillins; Demerol [meperidine]; Morphine; and Pcn [penicillins]    Review of Systems   Constitutional: Negative. Negative for chills and fever. HENT: Negative. Negative for congestion and rhinorrhea. Respiratory: Negative. Negative for cough, chest tightness and wheezing. Cardiovascular: Negative. Negative for chest pain and palpitations. Gastrointestinal: Negative. Negative for abdominal pain, constipation, nausea and vomiting. Endocrine: Negative. Genitourinary: Negative. Negative for decreased urine volume, flank pain, hematuria and pelvic pain. Musculoskeletal: Positive for myalgias (RLE from foot to knee). Negative for back pain and neck pain. Skin: Negative. Negative for color change, pallor and rash. Neurological: Negative. Negative for dizziness, seizures, weakness, numbness and headaches. Hematological: Negative. Negative for adenopathy. Psychiatric/Behavioral: Negative. All other systems reviewed and are negative. Patient Vitals for the past 12 hrs:   Temp Pulse Resp BP SpO2   08/25/17 1915 - 69 18 113/50 92 %   08/25/17 1900 - 74 19 127/61 91 %   08/25/17 1845 - 67 14 118/55 93 %   08/25/17 1830 - 65 19 135/59 92 %   08/25/17 1815 - 70 19 148/56 92 %   08/25/17 1807 - 66 17 135/56 91 %   08/25/17 1656 - - - - 94 %   08/25/17 1615 - 63 12 122/53 95 %   08/25/17 1600 - 71 21 131/64 97 %   08/25/17 1545 - 65 20 121/51 94 %   08/25/17 1530 - 64 20 120/44 95 %   08/25/17 1515 - 63 19 (!) 104/35 93 %   08/25/17 1500 - 64 18 108/47 95 %   08/25/17 1449 97.6 °F (36.4 °C) 61 12 116/42 94 %   08/25/17 1445 - - - 116/42 94 %   08/25/17 1440 - - - 109/44 94 %       Physical Exam   Constitutional: She is oriented to person, place, and time. She appears well-developed and well-nourished. No distress.    HENT: Head: Normocephalic and atraumatic. Head is without Nelson's sign, without abrasion and without contusion. Right Ear: No hemotympanum. Left Ear: No hemotympanum. Mouth/Throat: Uvula is midline, oropharynx is clear and moist and mucous membranes are normal. No oropharyngeal exudate. Eyes: Conjunctivae are normal. Pupils are equal, round, and reactive to light. Right eye exhibits no discharge. Left eye exhibits no discharge. No scleral icterus. Neck: Normal range of motion. Neck supple. No JVD present. No spinous process tenderness and no muscular tenderness present. No rigidity. No erythema and normal range of motion present. Cardiovascular: Normal rate, regular rhythm, normal heart sounds and intact distal pulses. Exam reveals no gallop and no friction rub. No murmur heard. Pulses:       Dorsalis pedis pulses are 2+ on the right side, and 2+ on the left side. Posterior tibial pulses are 2+ on the right side, and 2+ on the left side. No compartment signs in right leg   Pulmonary/Chest: Effort normal and breath sounds normal. No stridor. No respiratory distress. She has no wheezes. She has no rales. She exhibits no tenderness. Abdominal: Soft. Bowel sounds are normal. She exhibits no distension and no mass. There is no tenderness. There is no rebound and no guarding. Musculoskeletal: She exhibits edema and tenderness. Right shoulder: Normal.        Left shoulder: Normal.        Right elbow: Normal.       Left elbow: Normal.        Right ankle: She exhibits decreased range of motion and swelling. Tenderness. Lateral malleolus and proximal fibula tenderness found. Achilles tendon normal.        Lumbar back: Normal.        Right lower leg: She exhibits tenderness. She exhibits no bony tenderness, no swelling, no edema, no deformity and no laceration. Right foot: There is tenderness and bony tenderness. Neurological: She is alert and oriented to person, place, and time.  She displays normal reflexes. No cranial nerve deficit. She exhibits normal muscle tone. Coordination normal.   Skin: Skin is warm. No rash noted. She is not diaphoretic. No pallor. Nursing note and vitals reviewed. MDM  Number of Diagnoses or Management Options  Bimalleolar ankle fracture, right, closed, initial encounter:   Diagnosis management comments: DDx: mechanical fall, closed head injury, leg fx, pelvic fx, compartment syndrome    Unwitnessed fall, but suspect mechanical in nature as she has had falls before. No signs of head injury. Clinically has pain in R lower leg and X-Ray confirms stable tib-fib fx. Seen by ortho, splinted. Will f/u in their office. Amount and/or Complexity of Data Reviewed  Clinical lab tests: ordered and reviewed  Tests in the radiology section of CPT®: ordered and reviewed  Obtain history from someone other than the patient: yes  Review and summarize past medical records: yes  Discuss the patient with other providers: yes    Risk of Complications, Morbidity, and/or Mortality  Presenting problems: moderate  Diagnostic procedures: moderate  Management options: moderate    Patient Progress  Patient progress: stable    ED Course       Procedures     3:10 PM  Daughter, who is power of , did not want any extensive testing performed other than XRs. Written by HERNESTO Medina, as dictated by Armani Edmonds MD.    CONSULT NOTE:   6:00 PM  Armani Edmonds MD spoke with Montserrat Landon PA-C,   Specialty: ortho  Discussed pt's hx, disposition, and available diagnostic and imaging results. Reviewed care plans. Consultant agrees with plans as outlined. Nancy Bo will evaluate the pt.    Written by HERNESTO Medina, as dictated by Armani Edmonds MD.          LABORATORY TESTS:  Recent Results (from the past 12 hour(s))   GLUCOSE, POC    Collection Time: 08/25/17  4:10 PM   Result Value Ref Range    Glucose (POC) 207 (H) 65 - 100 mg/dL    Performed by Nancy Dixon        IMAGING RESULTS:  XR TIB/FIB RT   Final Result   XR TIB/FIB RT, XR ANKLE RT MIN 3 V, XR FOOT RT MIN 3 V ,8/25/2017 5:19 PM  INDICATION:  Additional history: Ground level fall, unwitnessed. Patient complains of right  ankle and knee pain. COMPARISON: X-ray of the right tibia and fibula, 9/14/2012  . FINDINGS:  TIBIA and FIBULA: 2 views  The proximal tibia and fibula are unremarkable. There is a fracture through the  medial malleolus. There is a fracture through the distal fibular metadiaphysis. Osteopenia. Lorrain Passey ANKLE: 3 views  Fracture of the medial and lateral malleoli, not significantly displaced. The  ankle joint appears congruent osteopenia. There is associated soft tissue  swelling about the ankle. Lorrain Passey FOOT: 3 views  Osteopenia. Degenerative changes in the midfoot. Hammertoes. .  IMPRESSION  IMPRESSION:  1. Fractures of the medial and lateral malleoli. The ankle joint appears  congruent. XR ANKLE RT MIN 3 V   Final Result   XR TIB/FIB RT, XR ANKLE RT MIN 3 V, XR FOOT RT MIN 3 V ,8/25/2017 5:19 PM  INDICATION:  Additional history: Ground level fall, unwitnessed. Patient complains of right  ankle and knee pain. COMPARISON: X-ray of the right tibia and fibula, 9/14/2012  . FINDINGS:  TIBIA and FIBULA: 2 views  The proximal tibia and fibula are unremarkable. There is a fracture through the  medial malleolus. There is a fracture through the distal fibular metadiaphysis. Osteopenia. Lorrain Passey ANKLE: 3 views  Fracture of the medial and lateral malleoli, not significantly displaced. The  ankle joint appears congruent osteopenia. There is associated soft tissue  swelling about the ankle. Lorrain Passey FOOT: 3 views  Osteopenia. Degenerative changes in the midfoot. Hammertoes. .  IMPRESSION  IMPRESSION:  1. Fractures of the medial and lateral malleoli. The ankle joint appears  congruent.    XR FOOT RT MIN 3 V   Final Result   XR TIB/FIB RT, XR ANKLE RT MIN 3 V, XR FOOT RT MIN 3 V ,8/25/2017 5:19 PM  INDICATION:  Additional history: Ground level fall, unwitnessed. Patient complains of right  ankle and knee pain. COMPARISON: X-ray of the right tibia and fibula, 9/14/2012  . FINDINGS:  TIBIA and FIBULA: 2 views  The proximal tibia and fibula are unremarkable. There is a fracture through the  medial malleolus. There is a fracture through the distal fibular metadiaphysis. Osteopenia. Mary Peaks ANKLE: 3 views  Fracture of the medial and lateral malleoli, not significantly displaced. The  ankle joint appears congruent osteopenia. There is associated soft tissue  swelling about the ankle. Mary Peaks FOOT: 3 views  Osteopenia. Degenerative changes in the midfoot. Hammertoes. .  IMPRESSION  IMPRESSION:  1. Fractures of the medial and lateral malleoli. The ankle joint appears  congruent. MEDICATIONS GIVEN:  Medications   HYDROcodone-acetaminophen (NORCO) 5-325 mg per tablet 1 Tab (1 Tab Oral Given 8/25/17 1520)       IMPRESSION:  1. Bimalleolar ankle fracture, right, closed, initial encounter        PLAN:  1. Discharge Medication List as of 8/25/2017  7:22 PM      START taking these medications    Details   HYDROcodone-acetaminophen (NORCO) 5-325 mg per tablet Take 1 Tab by mouth every eight (8) hours as needed for Pain. Max Daily Amount: 3 Tabs., Print, Disp-20 Tab, R-0         CONTINUE these medications which have NOT CHANGED    Details   mirtazapine (REMERON) 15 mg tablet Take 15 mg by mouth nightly., Historical Med      metFORMIN (GLUCOPHAGE) 500 mg tablet Take 500 mg by mouth two (2) times daily (with meals). , Historical Med      amLODIPine (NORVASC) 10 mg tablet Take 10 mg by mouth daily. , Historical Med      lisinopril (PRINIVIL, ZESTRIL) 10 mg tablet Take 10 mg by mouth daily. , Historical Med      memantine (NAMENDA) 10 mg tablet Take 10 mg by mouth two (2) times a day., Historical Med      multivitamin (ONE A DAY) tablet Take 1 Tab by mouth daily. , Historical Med      aspirin 81 mg chewable tablet Take 1 Tab by mouth daily. OTC, 81 mg      levothyroxine (SYNTHROID) 50 mcg tablet Take 50 mcg by mouth Daily (before breakfast). Historical Med, 50 mcg      cyanocobalamin (VITAMIN B-12) 1,000 mcg tablet Take 1,000 mcg by mouth every other day. Historical Med, 1,000 mcg      polyethylene glycol (MIRALAX) 17 gram packet Take 17 g by mouth as needed. Historical Med, 17 g      docusate sodium (COLACE) 100 mg capsule Take 100 mg by mouth every other day., Historical Med           2. Follow-up Information     Follow up With Details Comments Zuleyka Flores MD Call in 2 days  [facility] [place] [address] [place] [address] 83.  [phone]      [facility] EMERGENCY DEPT  If symptoms worsen [address]  [phone]        Return to ED if worse       Discharge Note:  7:26 PM  The pt is ready for discharge. The pt's signs, symptoms, diagnosis, and discharge instructions have been discussed and pt has conveyed their understanding. The pt is to follow up as recommended or return to ER should their symptoms worsen. Plan has been discussed and pt is in agreement. This note is prepared by Teddy Gastelum, acting as a Scribe for Annamaria Mendez MD.    Annamaria Mendez MD: The scribe's documentation has been prepared under my direction and personally reviewed by me in its entirety. I confirm that the notes above accurately reflects all work, treatment, procedures, and medical decision making performed by me.

## 2017-08-25 NOTE — ED NOTES
Assumed care of patient from [facility]. Patient resting comfortably in no apparent distress. On monitor x 3. Call bell within reach. Plan of care discussed.

## 2017-08-26 NOTE — ED NOTES
Discharge instructions reviewed with pt and copy given along with RX by Dr Ирина Peña. All questions answered at this time. Pt discharged via wheelchair home with daughter.

## 2017-09-03 ENCOUNTER — APPOINTMENT (OUTPATIENT)
Dept: GENERAL RADIOLOGY | Age: 82
DRG: 312 | End: 2017-09-03
Attending: EMERGENCY MEDICINE
Payer: MEDICARE

## 2017-09-03 ENCOUNTER — APPOINTMENT (OUTPATIENT)
Dept: CT IMAGING | Age: 82
DRG: 312 | End: 2017-09-03
Attending: EMERGENCY MEDICINE
Payer: MEDICARE

## 2017-09-03 ENCOUNTER — HOSPITAL ENCOUNTER (INPATIENT)
Age: 82
LOS: 4 days | Discharge: HOME HEALTH | DRG: 312 | End: 2017-09-07
Attending: EMERGENCY MEDICINE | Admitting: INTERNAL MEDICINE
Payer: MEDICARE

## 2017-09-03 DIAGNOSIS — E87.20 LACTIC ACIDOSIS: ICD-10-CM

## 2017-09-03 DIAGNOSIS — N17.9 AKI (ACUTE KIDNEY INJURY) (HCC): ICD-10-CM

## 2017-09-03 DIAGNOSIS — E86.0 DEHYDRATION: ICD-10-CM

## 2017-09-03 DIAGNOSIS — R55 SYNCOPE AND COLLAPSE: Primary | ICD-10-CM

## 2017-09-03 LAB
ALBUMIN SERPL-MCNC: 2.8 G/DL (ref 3.5–5)
ALBUMIN/GLOB SERPL: 0.7 {RATIO} (ref 1.1–2.2)
ALP SERPL-CCNC: 145 U/L (ref 45–117)
ALT SERPL-CCNC: 22 U/L (ref 12–78)
ANION GAP SERPL CALC-SCNC: 7 MMOL/L (ref 5–15)
APPEARANCE UR: CLEAR
AST SERPL-CCNC: 15 U/L (ref 15–37)
BACTERIA URNS QL MICRO: NEGATIVE /HPF
BASOPHILS # BLD: 0 K/UL (ref 0–0.1)
BASOPHILS NFR BLD: 0 % (ref 0–1)
BILIRUB SERPL-MCNC: 0.4 MG/DL (ref 0.2–1)
BILIRUB UR QL: NEGATIVE
BUN SERPL-MCNC: 31 MG/DL (ref 6–20)
BUN/CREAT SERPL: 21 (ref 12–20)
CALCIUM SERPL-MCNC: 8.3 MG/DL (ref 8.5–10.1)
CHLORIDE SERPL-SCNC: 102 MMOL/L (ref 97–108)
CK SERPL-CCNC: 95 U/L (ref 26–192)
CO2 SERPL-SCNC: 27 MMOL/L (ref 21–32)
COLOR UR: ABNORMAL
CREAT SERPL-MCNC: 1.5 MG/DL (ref 0.55–1.02)
EOSINOPHIL # BLD: 0.1 K/UL (ref 0–0.4)
EOSINOPHIL NFR BLD: 1 % (ref 0–7)
EPITH CASTS URNS QL MICRO: ABNORMAL /LPF
ERYTHROCYTE [DISTWIDTH] IN BLOOD BY AUTOMATED COUNT: 15.7 % (ref 11.5–14.5)
GLOBULIN SER CALC-MCNC: 4.3 G/DL (ref 2–4)
GLUCOSE SERPL-MCNC: 247 MG/DL (ref 65–100)
GLUCOSE UR STRIP.AUTO-MCNC: NEGATIVE MG/DL
HCT VFR BLD AUTO: 34.1 % (ref 35–47)
HGB BLD-MCNC: 11 G/DL (ref 11.5–16)
HGB UR QL STRIP: NEGATIVE
KETONES UR QL STRIP.AUTO: NEGATIVE MG/DL
LACTATE SERPL-SCNC: 2.2 MMOL/L (ref 0.4–2)
LACTATE SERPL-SCNC: 2.9 MMOL/L (ref 0.4–2)
LEUKOCYTE ESTERASE UR QL STRIP.AUTO: NEGATIVE
LYMPHOCYTES # BLD: 1.1 K/UL (ref 0.8–3.5)
LYMPHOCYTES NFR BLD: 10 % (ref 12–49)
MCH RBC QN AUTO: 27.3 PG (ref 26–34)
MCHC RBC AUTO-ENTMCNC: 32.3 G/DL (ref 30–36.5)
MCV RBC AUTO: 84.6 FL (ref 80–99)
MONOCYTES # BLD: 0.6 K/UL (ref 0–1)
MONOCYTES NFR BLD: 5 % (ref 5–13)
NEUTS SEG # BLD: 9.9 K/UL (ref 1.8–8)
NEUTS SEG NFR BLD: 84 % (ref 32–75)
NITRITE UR QL STRIP.AUTO: NEGATIVE
PH UR STRIP: 6 [PH] (ref 5–8)
PLATELET # BLD AUTO: 310 K/UL (ref 150–400)
POTASSIUM SERPL-SCNC: 5.1 MMOL/L (ref 3.5–5.1)
PROT SERPL-MCNC: 7.1 G/DL (ref 6.4–8.2)
PROT UR STRIP-MCNC: NEGATIVE MG/DL
RBC # BLD AUTO: 4.03 M/UL (ref 3.8–5.2)
RBC #/AREA URNS HPF: ABNORMAL /HPF (ref 0–5)
SODIUM SERPL-SCNC: 136 MMOL/L (ref 136–145)
SP GR UR REFRACTOMETRY: 1.02 (ref 1–1.03)
TROPONIN I SERPL-MCNC: <0.04 NG/ML
UA: UC IF INDICATED,UAUC: ABNORMAL
UROBILINOGEN UR QL STRIP.AUTO: 1 EU/DL (ref 0.2–1)
WBC # BLD AUTO: 11.7 K/UL (ref 3.6–11)
WBC URNS QL MICRO: ABNORMAL /HPF (ref 0–4)

## 2017-09-03 PROCEDURE — 74011250637 HC RX REV CODE- 250/637: Performed by: EMERGENCY MEDICINE

## 2017-09-03 PROCEDURE — 96361 HYDRATE IV INFUSION ADD-ON: CPT

## 2017-09-03 PROCEDURE — 96368 THER/DIAG CONCURRENT INF: CPT

## 2017-09-03 PROCEDURE — 74176 CT ABD & PELVIS W/O CONTRAST: CPT

## 2017-09-03 PROCEDURE — 65270000029 HC RM PRIVATE

## 2017-09-03 PROCEDURE — 65660000000 HC RM CCU STEPDOWN

## 2017-09-03 PROCEDURE — 93005 ELECTROCARDIOGRAM TRACING: CPT

## 2017-09-03 PROCEDURE — 74011250636 HC RX REV CODE- 250/636: Performed by: INTERNAL MEDICINE

## 2017-09-03 PROCEDURE — 74011000250 HC RX REV CODE- 250: Performed by: EMERGENCY MEDICINE

## 2017-09-03 PROCEDURE — 71010 XR CHEST PORT: CPT

## 2017-09-03 PROCEDURE — 85025 COMPLETE CBC W/AUTO DIFF WBC: CPT | Performed by: EMERGENCY MEDICINE

## 2017-09-03 PROCEDURE — 83605 ASSAY OF LACTIC ACID: CPT | Performed by: EMERGENCY MEDICINE

## 2017-09-03 PROCEDURE — 81001 URINALYSIS AUTO W/SCOPE: CPT | Performed by: EMERGENCY MEDICINE

## 2017-09-03 PROCEDURE — 96365 THER/PROPH/DIAG IV INF INIT: CPT

## 2017-09-03 PROCEDURE — 82550 ASSAY OF CK (CPK): CPT | Performed by: EMERGENCY MEDICINE

## 2017-09-03 PROCEDURE — 74011250637 HC RX REV CODE- 250/637: Performed by: INTERNAL MEDICINE

## 2017-09-03 PROCEDURE — 87040 BLOOD CULTURE FOR BACTERIA: CPT | Performed by: EMERGENCY MEDICINE

## 2017-09-03 PROCEDURE — 80053 COMPREHEN METABOLIC PANEL: CPT | Performed by: EMERGENCY MEDICINE

## 2017-09-03 PROCEDURE — 99285 EMERGENCY DEPT VISIT HI MDM: CPT

## 2017-09-03 PROCEDURE — 74011250636 HC RX REV CODE- 250/636: Performed by: EMERGENCY MEDICINE

## 2017-09-03 PROCEDURE — 84484 ASSAY OF TROPONIN QUANT: CPT | Performed by: EMERGENCY MEDICINE

## 2017-09-03 PROCEDURE — 36415 COLL VENOUS BLD VENIPUNCTURE: CPT | Performed by: EMERGENCY MEDICINE

## 2017-09-03 RX ORDER — ENOXAPARIN SODIUM 100 MG/ML
30 INJECTION SUBCUTANEOUS EVERY 24 HOURS
Status: DISCONTINUED | OUTPATIENT
Start: 2017-09-03 | End: 2017-09-03 | Stop reason: DRUGHIGH

## 2017-09-03 RX ORDER — LORAZEPAM 2 MG/ML
1 INJECTION INTRAMUSCULAR
Status: DISCONTINUED | OUTPATIENT
Start: 2017-09-03 | End: 2017-09-07 | Stop reason: HOSPADM

## 2017-09-03 RX ORDER — ONDANSETRON 2 MG/ML
4 INJECTION INTRAMUSCULAR; INTRAVENOUS
Status: DISCONTINUED | OUTPATIENT
Start: 2017-09-03 | End: 2017-09-07 | Stop reason: HOSPADM

## 2017-09-03 RX ORDER — LEVOFLOXACIN 5 MG/ML
750 INJECTION, SOLUTION INTRAVENOUS
Status: COMPLETED | OUTPATIENT
Start: 2017-09-03 | End: 2017-09-03

## 2017-09-03 RX ORDER — ACETAMINOPHEN 325 MG/1
650 TABLET ORAL
Status: COMPLETED | OUTPATIENT
Start: 2017-09-03 | End: 2017-09-03

## 2017-09-03 RX ORDER — LEVOTHYROXINE SODIUM 50 UG/1
50 TABLET ORAL
Status: DISCONTINUED | OUTPATIENT
Start: 2017-09-04 | End: 2017-09-07 | Stop reason: HOSPADM

## 2017-09-03 RX ORDER — MEMANTINE HYDROCHLORIDE 10 MG/1
10 TABLET ORAL 2 TIMES DAILY
Status: DISCONTINUED | OUTPATIENT
Start: 2017-09-04 | End: 2017-09-06

## 2017-09-03 RX ORDER — LEVOFLOXACIN 5 MG/ML
500 INJECTION, SOLUTION INTRAVENOUS
Status: DISCONTINUED | OUTPATIENT
Start: 2017-09-03 | End: 2017-09-03 | Stop reason: DRUGHIGH

## 2017-09-03 RX ORDER — GUAIFENESIN 100 MG/5ML
81 LIQUID (ML) ORAL DAILY
Status: DISCONTINUED | OUTPATIENT
Start: 2017-09-04 | End: 2017-09-07 | Stop reason: HOSPADM

## 2017-09-03 RX ORDER — METRONIDAZOLE 500 MG/100ML
500 INJECTION, SOLUTION INTRAVENOUS
Status: COMPLETED | OUTPATIENT
Start: 2017-09-03 | End: 2017-09-03

## 2017-09-03 RX ORDER — RISPERIDONE 0.25 MG/1
0.25 TABLET, FILM COATED ORAL 2 TIMES DAILY
Status: DISCONTINUED | OUTPATIENT
Start: 2017-09-04 | End: 2017-09-07 | Stop reason: HOSPADM

## 2017-09-03 RX ORDER — ACETAMINOPHEN 325 MG/1
650 TABLET ORAL
Status: DISCONTINUED | OUTPATIENT
Start: 2017-09-03 | End: 2017-09-07 | Stop reason: HOSPADM

## 2017-09-03 RX ORDER — SODIUM CHLORIDE 9 MG/ML
100 INJECTION, SOLUTION INTRAVENOUS CONTINUOUS
Status: DISCONTINUED | OUTPATIENT
Start: 2017-09-03 | End: 2017-09-07 | Stop reason: HOSPADM

## 2017-09-03 RX ORDER — POLYETHYLENE GLYCOL 3350 17 G/17G
17 POWDER, FOR SOLUTION ORAL 3 TIMES DAILY
Status: DISCONTINUED | OUTPATIENT
Start: 2017-09-04 | End: 2017-09-04 | Stop reason: SDUPTHER

## 2017-09-03 RX ADMIN — ACETAMINOPHEN 650 MG: 325 TABLET ORAL at 17:35

## 2017-09-03 RX ADMIN — LEVOFLOXACIN 750 MG: 5 INJECTION, SOLUTION INTRAVENOUS at 17:07

## 2017-09-03 RX ADMIN — ENOXAPARIN SODIUM 25 MG: 60 INJECTION SUBCUTANEOUS at 21:13

## 2017-09-03 RX ADMIN — SODIUM CHLORIDE 500 ML: 900 INJECTION, SOLUTION INTRAVENOUS at 18:14

## 2017-09-03 RX ADMIN — SODIUM CHLORIDE 100 ML/HR: 900 INJECTION, SOLUTION INTRAVENOUS at 21:03

## 2017-09-03 RX ADMIN — LORAZEPAM 1 MG: 2 INJECTION INTRAMUSCULAR; INTRAVENOUS at 23:02

## 2017-09-03 RX ADMIN — SODIUM CHLORIDE 500 ML: 900 INJECTION, SOLUTION INTRAVENOUS at 15:53

## 2017-09-03 RX ADMIN — METRONIDAZOLE 500 MG: 500 INJECTION, SOLUTION INTRAVENOUS at 17:07

## 2017-09-03 NOTE — IP AVS SNAPSHOT
[address] [facility] 
[phone] Patient: Raven Villasenor MRN: [id number] XRO:[id number] You are allergic to the following Allergen Reactions Demerol (Meperidine) Unknown (comments) Morphine Unknown (comments) Penicillins Unknown (comments) Demerol (Meperidine) Other (comments) Unsure of reaction Morphine Other (comments) Unsure of reaction Pcn (Penicillins) Other (comments)  
 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  
  
CONTINUE these medications which have CHANGED Dose & Instructions Dispensing Information Comments Morning Noon Evening Bedtime  
 polyethylene glycol 17 gram packet Commonly known as:  Bulmaro Winchester What changed:  when to take this Your last dose was: Your next dose is:    
   
   
 Dose:  17 g Take 1 Packet by mouth two (2) times a day. Quantity:  30 Each Refills:  0 CONTINUE these medications which have NOT CHANGED Dose & Instructions Dispensing Information Comments Morning Noon Evening Bedtime  
 acetaminophen 325 mg tablet Commonly known as:  TYLENOL Your last dose was: Your next dose is:    
   
   
 Dose:  325 mg Take 325 mg by mouth every six (6) hours as needed for Pain or Fever. Refills:  0  
     
   
   
   
  
 amLODIPine 10 mg tablet Commonly known as:  Bell Nobles Your last dose was: Your next dose is:    
   
   
 Dose:  10 mg Take 10 mg by mouth daily. Refills:  0  
     
   
   
   
  
 aspirin 81 mg chewable tablet Your last dose was: Your next dose is:    
   
   
 Dose:  81 mg Take 1 Tab by mouth daily. Refills:  0  
     
   
   
   
  
 docusate sodium 100 mg capsule Commonly known as:  Taya Robbins Your last dose was: Your next dose is:    
   
   
 Dose:  100 mg Take 100 mg by mouth daily. Refills:  0  
     
   
   
   
  
 levothyroxine 50 mcg tablet Commonly known as:  SYNTHROID Your last dose was: Your next dose is:    
   
   
 Dose:  50 mcg Take 50 mcg by mouth Daily (before breakfast). Refills:  0  
     
   
   
   
  
 lisinopril 10 mg tablet Commonly known as:  Judie Jefferson Your last dose was: Your next dose is:    
   
   
 Dose:  10 mg Take 10 mg by mouth daily. Refills:  0  
     
   
   
   
  
 metFORMIN 500 mg tablet Commonly known as:  GLUCOPHAGE Your last dose was: Your next dose is:    
   
   
 Dose:  500 mg Take 500 mg by mouth daily. Refills:  0 Mineral Oil Drop Your last dose was: Your next dose is:    
   
   
 Dose:  2 Drop  
2 Drops by Otic route every seven (7) days. Refills:  0  
     
   
   
   
  
 OYSTER SHELL CALCIUM 500 PO Your last dose was: Your next dose is:    
   
   
 Dose:  500 mg Take 500 mg by mouth daily. Refills:  0 REMERON 15 mg tablet Generic drug:  mirtazapine Your last dose was: Your next dose is:    
   
   
 Dose:  15 mg Take 15 mg by mouth nightly. Indications: appetite Refills:  0  
     
   
   
   
  
 risperiDONE 0.25 mg tablet Commonly known as:  RisperDAL Your last dose was: Your next dose is:    
   
   
 Dose:  0.25 mg Take 0.25 mg by mouth two (2) times a day. Indications: mood swings Refills:  0  
     
   
   
   
  
 VITAMIN B-12 1,000 mcg tablet Generic drug:  cyanocobalamin Your last dose was: Your next dose is:    
   
   
 Dose:  1000 mcg Take 1,000 mcg by mouth daily. Refills:  0  
     
   
   
   
  
 VITAMIN D2 50,000 unit capsule Generic drug:  ergocalciferol Your last dose was: Your next dose is:    
   
   
 Dose:  22266 Units Take 50,000 Units by mouth every seven (7) days. Refills:  0  
     
   
   
   
  
 vitamin e 1,000 unit capsule Commonly known as:  E GEMS Your last dose was: Your next dose is:    
   
   
 Dose:  1000 Units Take 1,000 Units by mouth daily. Refills:  0 Where to Get Your Medications Information on where to get these meds will be given to you by the nurse or doctor. ! Ask your nurse or doctor about these medications  
  polyethylene glycol 17 gram packet Discharge Instructions None Discharge Orders None ACO Transitions of Care Introducing [employer] [address] Tessa Riley offers a voluntary care coordination program to provide high quality service and care to [facility] fee-for-service beneficiaries. Tiffanie Conner was designed to help you enhance your health and well-being through the following services: ? Transitions of Care  support for individuals who are transitioning from one care setting to another (example: Hospital to home). ? Chronic and Complex Care Coordination  support for individuals and caregivers of those with serious or chronic illnesses or with more than one chronic (ongoing) condition and those who take a number of different medications. If you meet specific medical criteria, a [address] [place] [address] Rd may call you directly to coordinate your care with your primary care physician and your other care providers. For questions about the [facility] programs, please, contact your physicians office. For general questions or additional information about Accountable Care Organizations: 
Please visit www.medicare.gov/acos. html or call 1-800-MEDICARE (9-359.845.7510) TTY users should call 3-272.150.5146. Introducing [facility]! Willadean Saint introduces [employer] patient portal. Now you can access parts of your medical record, email your doctor's office, and request medication refills online. 1. In your internet browser, go to [email or web address] 2. Click on the First Time User? Click Here link in the Sign In box. You will see the New Member Sign Up page. 3. Enter your [employer] Access Code exactly as it appears below. You will not need to use this code after youve completed the sign-up process. If you do not sign up before the expiration date, you must request a new code. · [employer] Access Code: [email or web address] Expires: 12/2/2017  3:35 PM 
 
4.  Enter the last four digits of your Social Security Number (xxxx) and Date of Birth (mm/dd/yyyy) as indicated and click Submit. You will be taken to the next sign-up page. 5. Create a [employer] ID. This will be your [employer] login ID and cannot be changed, so think of one that is secure and easy to remember. 6. Create a [employer] password. You can change your password at any time. 7. Enter your Password Reset Question and Answer. This can be used at a later time if you forget your password. 8. Enter your e-mail address. You will receive e-mail notification when new information is available in [address] 9. Click Sign Up. You can now view and download portions of your medical record. 10. Click the Download Summary menu link to download a portable copy of your medical information. If you have questions, please visit the Frequently Asked Questions section of the [employer] website. Remember, [employer] is NOT to be used for urgent needs. For medical emergencies, dial 911. Now available from your iPhone and Android! General Information Please provide this summary of care documentation to your next provider. Patient Signature:  ____________________________________________________________ Date:  ____________________________________________________________  
  
[facility] Provider Signature:  ____________________________________________________________ Date:  ____________________________________________________________

## 2017-09-03 NOTE — IP AVS SNAPSHOT
Summary of Care Report The Summary of Care report has been created to help improve care coordination. Users with access to [employer] or [address] (Web-based application) may access additional patient information including the Discharge Summary. If you are not currently a [address] user and need more information, please call the number listed below in the [place] [address] section and ask to be connected with Medical Records. Facility Information Name Address Phone [address] [address] [phone] Patient Information Patient Name Sex  Pasquale Vasquez ([id number]) Female 9/10/1922 Discharge Information Admitting Provider Service Area Unit Varsha Skaggs MD / [phone] [address] [facility] [address] [place] / [phone] Discharge Provider Discharge Date/Time Discharge Disposition Destination (none) 2017 (Pending) [facility] (none) Patient Language Language ENGLISH [13] Hospital Problems as of 2017  Reviewed: 2017  7:29 PM by ALMAS Pak Class Noted - Resolved Last Modified POA Active Problems * (Principal)Convulsive syncope  9/3/2017 - Present 2017 by Suzy Sanders MD Unknown Entered by Varsha Skaggs MD  
  
Non-Hospital Problems as of 2017  Reviewed: 2017  7:29 PM by ALMAS Pak Class Noted - Resolved Last Modified Active Problems Fall  9/15/2012 - Present 2012 Entered by Scar Herrera Hyponatremia  9/15/2012 - Present 2012 Entered by Scar Herrera Diabetes mellitus ([facility] [address]  9/15/2012 - Present 2012 Entered by Scar Herrera   Hypoxemia  2016 - Present 2016 by Varsha Skaggs MD  
  Entered by Varsha Skaggs MD  
 Closed olecranon fracture  1/25/2016 - Present 1/25/2016 by Mita Cheng MD  
  Entered by Mita Cheng MD  
  UTI (lower urinary tract infection)  1/25/2016 - Present 1/25/2016 by Mita Cheng MD  
  Entered by Mita Cheng MD  
  Dementia  1/25/2016 - Present 1/25/2016 by Mita Cheng MD  
  Entered by Mita Cheng MD  
  Closed right ankle fracture  8/25/2017 - Present 8/25/2017 by ALMAS Fotnenot Entered by ALMAS Fontenot You are allergic to the following Allergen Reactions Demerol (Meperidine) Unknown (comments) Morphine Unknown (comments) Penicillins Unknown (comments) Demerol (Meperidine) Other (comments) Unsure of reaction Morphine Other (comments) Unsure of reaction Pcn (Penicillins) Other (comments)  
 unknown Current Discharge Medication List  
  
CONTINUE these medications which have CHANGED Dose & Instructions Dispensing Information Comments  
 polyethylene glycol 17 gram packet Commonly known as:  Vernel Redder What changed:  when to take this Dose:  17 g Take 1 Packet by mouth two (2) times a day. Quantity:  30 Each Refills:  0 CONTINUE these medications which have NOT CHANGED Dose & Instructions Dispensing Information Comments  
 acetaminophen 325 mg tablet Commonly known as:  TYLENOL Dose:  325 mg Take 325 mg by mouth every six (6) hours as needed for Pain or Fever. Refills:  0  
   
 amLODIPine 10 mg tablet Commonly known as:  Mitchell Zhao Dose:  10 mg Take 10 mg by mouth daily. Refills:  0  
   
 aspirin 81 mg chewable tablet Dose:  81 mg Take 1 Tab by mouth daily. Refills:  0  
   
 docusate sodium 100 mg capsule Commonly known as:  [place] Dose:  100 mg Take 100 mg by mouth daily. Refills:  0  
   
 levothyroxine 50 mcg tablet Commonly known as:  SYNTHROID Dose:  50 mcg Take 50 mcg by mouth Daily (before breakfast). Refills:  0 lisinopril 10 mg tablet Commonly known as:  Goshen Livier Dose:  10 mg Take 10 mg by mouth daily. Refills:  0  
   
 metFORMIN 500 mg tablet Commonly known as:  GLUCOPHAGE Dose:  500 mg Take 500 mg by mouth daily. Refills:  0 Mineral Oil Drop Dose:  2 Drop  
2 Drops by Otic route every seven (7) days. Refills:  0  
   
 OYSTER SHELL CALCIUM 500 PO Dose:  500 mg Take 500 mg by mouth daily. Refills:  0 REMERON 15 mg tablet Generic drug:  mirtazapine Dose:  15 mg Take 15 mg by mouth nightly. Indications: appetite Refills:  0  
   
 risperiDONE 0.25 mg tablet Commonly known as:  RisperDAL Dose:  0.25 mg Take 0.25 mg by mouth two (2) times a day. Indications: mood swings Refills:  0  
   
 VITAMIN B-12 1,000 mcg tablet Generic drug:  cyanocobalamin Dose:  1000 mcg Take 1,000 mcg by mouth daily. Refills:  0  
   
 VITAMIN D2 50,000 unit capsule Generic drug:  ergocalciferol Dose:  31316 Units Take 50,000 Units by mouth every seven (7) days. Refills:  0  
   
 vitamin e 1,000 unit capsule Commonly known as:  E GEMS Dose:  1000 Units Take 1,000 Units by mouth daily. Refills:  0 Current Immunizations Name Date Influenza Vaccine Split 9/16/2012 TDAP Vaccine 9/14/2012 Follow-up Information Follow up With Details Comments Contact Info [address]   [phone] Discharge Instructions None Chart Review Routing History Recipient Method Report Sent By Raquel Holloway MD  
Fax: [phone] Phone: [phone] Fax Kuldip Lozada MD NOTES AUTO ROUTING REPORT MD Kandis Patel 1/25/2016  8:04 PM 01/25/2016 Connie Holloway MD  
Fax: [phone] Phone: [phone] Fax Kuldip Lozada MD NOTES AUTO ROUTING REPORT MD Kandis Patel 1/25/2016  8:06 PM 01/25/2016 Connie Holloway MD  
Fax: [phone] Phone: [phone] Fax Tramaine Bruno MD NOTES AUTO ROUTING REPORT Edgardo Hamlin MD [[id number]] 1/27/2016 11:51 AM 01/27/2016 Provider Unknown, MD  
Patient not available to ask [address] IP Auto Routed Notes MD Ralph Chou 9/3/2017  8:53 PM 09/03/2017 Provider Unknown, MD  
Patient not available to ask [address] IP Auto Routed Notes MD Ralph Chou 9/3/2017  8:54 PM 09/03/2017

## 2017-09-03 NOTE — ED NOTES
Pt incontinent of brown liquid stool; incontinence care provided. Small amount of non-blanchable redness noted to sacrum, present upon ED arrival. Mepilex dressing applied to sacrum. Repositioned pt onto her Rt side, pillows placed for comfort. Pt's daughter remains at bedside.

## 2017-09-03 NOTE — ED NOTES
Assumed care of patient from [address] and JEANNETTE Monroy. Patient resting comfortably in no apparent distress. On monitor x 3. Call bell within reach. Plan of care discussed.

## 2017-09-03 NOTE — ED NOTES
Pt presents to ED via EMS reporting syncopal episode at [facility] in [facility]. EMS reports that pt was with staff having a BM, when it is suspected that she had a vasovagal response and had a syncope lasting approx 2 min. Repots upon awakening, pt reported chest pain. Pt with hx of SBO and left leg fx. Pt has hx of dementia, with reported mentation at baseline. .

## 2017-09-03 NOTE — H&P
Hospitalist Admission Note    NAME: Noemi Suarez   :  9/10/1922   MRN:  [id number]     Date/Time:  9/3/2017 7:57 PM    Patient PCP: PROVIDER UNKNOWN  ________________________________________________________________________    My assessment of this patient's clinical condition and my plan of care is as follows. Assessment / Plan:    Convulsive syncope  -patient is impacted and had a vasovagal episode while straining to have a BM  -telemetry monitoring      Overflow fecal incontinence  -CT abdomen no definite evidence for obstruction. Fecal stasis in the rectosigmoid. -will need manual disimpaction but patient is very anxious. Will allow some ativan prn.  -continue with tap water enemas after disimpaction   -start miralax TID tomorrow. FADY  Dehydration  Hx HTN, now Hypotensive  Lactic acidosis  -will hold lisinopril and norvasc  -IVF hydration. Repeat lactic acid     DM  -hold metformin due to FADY. Consider stopping due to her age related CKD. 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 Hypothyroidism  -continue synthroid     Dementia  -continue namenda, risperidal  -hold remeron for now (given for appetite)     Recent right foot fracture  Hx rib fracture and left olecranon fracture      Code Status:   DNR  Surrogate Decision Maker:  Daughter      DVT Prophylaxis:  Lovenox  GI Prophylaxis: not indicated     Baseline:   Lives in a dementia unit. Does not need can or walker          Subjective:   CHIEF COMPLAINT:   Passed out    HISTORY OF PRESENT ILLNESS:     Raven Pizarro is a 80 y.o.  female with a history of dementia, HTN, DM and prior CVA who presents after a syncopal episode. Patient lives in a dementia unit ([address] [place] [address]) and was on the toilet trying to have a bowel movement when she passed out. Nursing reported patient having seizure like activity and she was out for just a few seconds.    She was noted to be hypotensive and having multiple bouts of diarrhea. EMS was dispatched and ER evaluation is remarkable for FADY and rectosigmoid fecal impaction or stasis seen on CT scan. We were asked to admit for work up and evaluation of the above problems. Past Medical History:   Diagnosis Date    [facility]     Diabetes mellitus [facility]     Diabetic acidosis, type II ([facility]     Hypertension     Hypothyroid     Hypothyroidism         Past Surgical History:   Procedure Laterality Date    HX APPENDECTOMY      HX CHOLECYSTECTOMY      HX HYSTERECTOMY         Social History   Substance Use Topics    Smoking status: Never Smoker    Smokeless tobacco: Never Used    Alcohol use No        Family History   Problem Relation Age of Onset    Diabetes Father     Cancer Mother      ovarian      Allergies   Allergen Reactions    Demerol [Meperidine] Unknown (comments)    Morphine Unknown (comments)    Penicillins Unknown (comments)    Demerol [Meperidine] Other (comments)     Unsure of reaction    Morphine Other (comments)     Unsure of reaction    Pcn [Penicillins] Other (comments)     unknown        Prior to Admission medications    Medication Sig Start Date End Date Taking? Authorizing Provider   mirtazapine (REMERON) 15 mg tablet Take 15 mg by mouth nightly. Suyapa Hung MD   metFORMIN (GLUCOPHAGE) 500 mg tablet Take 500 mg by mouth two (2) times daily (with meals). Suyapa Hung MD   HYDROcodone-acetaminophen (NORCO) 5-325 mg per tablet Take 1 Tab by mouth every eight (8) hours as needed for Pain. Max Daily Amount: 3 Tabs. 8/25/17   Gurwinder Alvarez MD   amLODIPine (NORVASC) 10 mg tablet Take 10 mg by mouth daily. Historical Provider   docusate sodium (COLACE) 100 mg capsule Take 100 mg by mouth every other day. Historical Provider   lisinopril (PRINIVIL, ZESTRIL) 10 mg tablet Take 10 mg by mouth daily. Historical Provider   memantine (NAMENDA) 10 mg tablet Take 10 mg by mouth two (2) times a day. Historical Provider   multivitamin (ONE A DAY) tablet Take 1 Tab by mouth daily. Historical Provider   aspirin 81 mg chewable tablet Take 1 Tab by mouth daily. 9/19/12   Rodrigue Farley MD   levothyroxine (SYNTHROID) 50 mcg tablet Take 50 mcg by mouth Daily (before breakfast). Suyapa Hung MD   cyanocobalamin (VITAMIN B-12) 1,000 mcg tablet Take 1,000 mcg by mouth every other day. Suyapa Hung MD   polyethylene glycol (MIRALAX) 17 gram packet Take 17 g by mouth as needed. Suyapa Hung MD       REVIEW OF SYSTEMS:     I am not able to complete the review of systems because: The patient is intubated and sedated    The patient has altered mental status due to his acute medical problems    The patient has baseline aphasia from prior stroke(s)   x The patient has baseline dementia and is not reliable historian    The patient is in acute medical distress and unable to provide information           Objective:   VITALS:    Visit Vitals    /41 (BP 1 Location: Left arm, BP Patient Position: At rest)    Pulse 73    Temp 97.4 °F (36.3 °C)    Resp 19    Ht 5' 4\" (1.626 m)    Wt 51.7 kg (114 lb)    SpO2 95%    BMI 19.57 kg/m2       PHYSICAL EXAM:    General:    Alert, in mod distress, appears stated age. HEENT: Atraumatic, anicteric sclerae, pink conjunctivae     No oral ulcers, mucosa dry  Neck:  Supple, symmetrical,  thyroid: non tender  Lungs:   Clear to auscultation bilaterally. No Wheezing or Rhonchi. No rales. Chest wall:  No tenderness  No Accessory muscle use. Heart:   Sl irregular  rhythm,  No edema  Abdomen:   Soft, non-tender. Not distended. Bowel sounds normal  Extremities: No cyanosis. No clubbing, Skin turgor normal, Capillary refill normal, Radial dial pulse 2+  Skin:     Not pale. Not Jaundiced  No rashes   Psych:  Poor insight. Not depressed. Not anxious or agitated. Neurologic: EOMs intact. No facial asymmetry. No aphasia or slurred speech.  Weak but symmetrical strength, Sensation grossly intact. Alert , recognize daughter.      _______________________________________________________________________  Care Plan discussed with:    Comments   Patient     Family  x  daughter   RN     Care Manager                    Consultant:      _______________________________________________________________________  Expected  Disposition:   Home with Family    HH/PT/OT/RN    SNF/LTC x   MERCEDES    ________________________________________________________________________  TOTAL TIME:  72  Minutes    Critical Care Provided     Minutes non procedure based      Comments    x Reviewed previous records   >50% of visit spent in counseling and coordination of care  Discussion with patient and/or family and questions answered       Given the patient's current clinical presentation, I have a high level of concern for decompensation if discharged from the ED. Complex decision making was performed which includes reviewing the patient's available past medical records, laboratory results, and Xray films. I have also directly communicated my plan and discussed this case with the involved ED physician.     ____________________________________________________________________  Himanshu Salazar MD    Procedures: see electronic medical records for all procedures/Xrays and details which were not copied into this note but were reviewed prior to creation of Plan.     LAB DATA REVIEWED:    Recent Results (from the past 24 hour(s))   EKG, 12 LEAD, INITIAL    Collection Time: 09/03/17  3:39 PM   Result Value Ref Range    Ventricular Rate 67 BPM    Atrial Rate 67 BPM    P-R Interval 138 ms    QRS Duration 86 ms    Q-T Interval 428 ms    QTC Calculation (Bezet) 452 ms    Calculated P Axis 67 degrees    Calculated R Axis 26 degrees    Calculated T Axis 65 degrees    Diagnosis       Normal sinus rhythm  Nonspecific ST abnormality  Abnormal ECG  When compared with ECG of 25-JAN-2016 12:51,  ST no longer depressed in Lateral leads     URINALYSIS W/ REFLEX CULTURE    Collection Time: 09/03/17  4:24 PM   Result Value Ref Range    Color YELLOW/STRAW      Appearance CLEAR CLEAR      Specific gravity 1.017 1.003 - 1.030      pH (UA) 6.0 5.0 - 8.0      Protein NEGATIVE  NEG mg/dL    Glucose NEGATIVE  NEG mg/dL    Ketone NEGATIVE  NEG mg/dL    Bilirubin NEGATIVE  NEG      Blood NEGATIVE  NEG      Urobilinogen 1.0 0.2 - 1.0 EU/dL    Nitrites NEGATIVE  NEG      Leukocyte Esterase NEGATIVE  NEG      WBC 0-4 0 - 4 /hpf    RBC 0-5 0 - 5 /hpf    Epithelial cells MODERATE (A) FEW /lpf    Bacteria NEGATIVE  NEG /hpf    UA:UC IF INDICATED CULTURE NOT INDICATED BY UA RESULT CNI     LACTIC ACID    Collection Time: 09/03/17  4:44 PM   Result Value Ref Range    Lactic acid 2.9 (HH) 0.4 - 2.0 MMOL/L   CBC WITH AUTOMATED DIFF    Collection Time: 09/03/17  4:44 PM   Result Value Ref Range    WBC 11.7 (H) 3.6 - 11.0 K/uL    RBC 4.03 3.80 - 5.20 M/uL    HGB 11.0 (L) 11.5 - 16.0 g/dL    HCT 34.1 (L) 35.0 - 47.0 %    MCV 84.6 80.0 - 99.0 FL    MCH 27.3 26.0 - 34.0 PG    MCHC 32.3 30.0 - 36.5 g/dL    RDW 15.7 (H) 11.5 - 14.5 %    PLATELET 081 486 - 115 K/uL    NEUTROPHILS 84 (H) 32 - 75 %    LYMPHOCYTES 10 (L) 12 - 49 %    MONOCYTES 5 5 - 13 %    EOSINOPHILS 1 0 - 7 %    BASOPHILS 0 0 - 1 %    ABS. NEUTROPHILS 9.9 (H) 1.8 - 8.0 K/UL    ABS. LYMPHOCYTES 1.1 0.8 - 3.5 K/UL    ABS. MONOCYTES 0.6 0.0 - 1.0 K/UL    ABS. EOSINOPHILS 0.1 0.0 - 0.4 K/UL    ABS.  BASOPHILS 0.0 0.0 - 0.1 K/UL   CK W/ REFLX CKMB    Collection Time: 09/03/17  4:44 PM   Result Value Ref Range    CK 95 26 - 034 U/L   METABOLIC PANEL, COMPREHENSIVE    Collection Time: 09/03/17  4:44 PM   Result Value Ref Range    Sodium 136 136 - 145 mmol/L    Potassium 5.1 3.5 - 5.1 mmol/L    Chloride 102 97 - 108 mmol/L    CO2 27 21 - 32 mmol/L    Anion gap 7 5 - 15 mmol/L    Glucose 247 (H) 65 - 100 mg/dL    BUN 31 (H) 6 - 20 MG/DL    Creatinine 1.50 (H) 0.55 - 1.02 MG/DL    BUN/Creatinine ratio 21 (H) 12 - 20      GFR est AA 39 (L) >60 ml/min/1.73m2    GFR est non-AA 32 (L) >60 ml/min/1.73m2    Calcium 8.3 (L) 8.5 - 10.1 MG/DL    Bilirubin, total 0.4 0.2 - 1.0 MG/DL    ALT (SGPT) 22 12 - 78 U/L    AST (SGOT) 15 15 - 37 U/L    Alk.  phosphatase 145 (H) 45 - 117 U/L    Protein, total 7.1 6.4 - 8.2 g/dL    Albumin 2.8 (L) 3.5 - 5.0 g/dL    Globulin 4.3 (H) 2.0 - 4.0 g/dL    A-G Ratio 0.7 (L) 1.1 - 2.2     TROPONIN I    Collection Time: 09/03/17  4:44 PM   Result Value Ref Range    Troponin-I, Qt. <0.04 <0.05 ng/mL

## 2017-09-03 NOTE — ED PROVIDER NOTES
HPI Comments: Patricia Davila is a 80 y.o. female with PMhx significant for colitis, hypothyroid, DM, and HTN who presents via EMS to the ED with cc of of an observed syncopal episode today while having a bowel movement. The nurse reported possible seizure activity. Pt has also been hypotensive and had multiple episodes of diarrhea. Upon exam, patient states she remembers passing out today. Her daughter reports that her mental state is at baseline. Per chart review, patient's blood pressures are usually normal (>110). She recently fractured her right foot and is in a cast.  Her daughter states she has been taking pain medication for her fractured foot. Her daughter states she has no history of seizures. Social Hx: - Tobacco, - EtOH, - Illicit Drugs    PCP: PROVIDER UNKNOWN    HPI is limited due to patient history of dementia. The history is provided by the EMS personnel and a relative. The history is limited by the condition of the patient (history of dementia). No  was used. Past Medical History:   Diagnosis Date    Diabetes ([address]     Diabetes mellitus ([address]     Diabetic acidosis, type II ([address]     Hypertension     Hypothyroid     Hypothyroidism        Past Surgical History:   Procedure Laterality Date    HX APPENDECTOMY      HX CHOLECYSTECTOMY      HX HYSTERECTOMY           Family History:   Problem Relation Age of Onset    Diabetes Father     Cancer Mother      ovarian        Social History     Social History    Marital status:      Spouse name: N/A    Number of children: N/A    Years of education: N/A     Occupational History    Not on file.      Social History Main Topics    Smoking status: Never Smoker    Smokeless tobacco: Never Used    Alcohol use No    Drug use: No    Sexual activity: Not on file     Other Topics Concern    Not on file     Social History Narrative    ** Merged History Encounter **              ALLERGIES: Demerol [meperidine]; Morphine; Penicillins; Demerol [meperidine]; Morphine; and Pcn [penicillins]    Review of Systems   Unable to perform ROS: Dementia     Patient Vitals for the past 12 hrs:   Temp Pulse Resp BP SpO2   09/03/17 1745 - 73 19 118/41 95 %   09/03/17 1740 - - - - 95 %   09/03/17 1700 - 73 19 (!) 105/36 95 %   09/03/17 1615 - 69 21 (!) 96/36 94 %   09/03/17 1547 97.4 °F (36.3 °C) 69 - (!) 93/34 95 %       Physical Exam   Constitutional: She appears well-developed and well-nourished. HENT:   Head: Normocephalic and atraumatic. Mouth/Throat: Mucous membranes are dry. Eyes: Conjunctivae and EOM are normal.   Neck: Normal range of motion. Neck supple. Cardiovascular: Normal rate and regular rhythm. Pulmonary/Chest: Effort normal and breath sounds normal. No respiratory distress. Abdominal: Soft. She exhibits no distension. There is tenderness (diffuse). Musculoskeletal: Normal range of motion. Neurological:   Patient is nonverbal except saying that she is cold. She responds to yes or no questions. Skin: Skin is warm and dry. Psychiatric: She has a normal mood and affect. Nursing note and vitals reviewed. MDM  Number of Diagnoses or Management Options  FADY (acute kidney injury) ([facility]):   Dehydration:   Lactic acidosis:   Syncope and collapse:   Diagnosis management comments: Patient presents with syncope and hypotension. Ddx: vasovagal/situational syncope, cardiogenic syncope, orthostatic hypotension, dehydration. Will get labs, EKG, orthostatics and fluids. Less likely seizure and more likely convulsive syncope. Possibly related to gastroenteritis or colitis so will get CT abd as having active diarrhea here.             Amount and/or Complexity of Data Reviewed  Clinical lab tests: ordered and reviewed  Tests in the radiology section of CPT®: reviewed and ordered  Tests in the medicine section of CPT®: ordered and reviewed  Obtain history from someone other than the patient: yes (Daughter, EMS)  Review and summarize past medical records: yes  Independent visualization of images, tracings, or specimens: yes      ED Course       Procedures    EKG interpretation: (Preliminary) 1539   Rhythm: normal sinus rhythm; and regular . Rate (approx.): 67; Axis: normal; MS interval: normal; QRS interval: normal ; ST/T wave: normal; Other findings: Artifact in lateral V5-V6. Written by Jolanta Blanco, ED Scribe as dictated by Paula Dumont MD    5:58 PM  Updated patient on available results. CONSULT NOTE:  6:43 PM  Paula Dumont MD spoke with Dr. Jose Miguel Kilgore,  Specialty: Hospitalist  Discussed patient's hx, disposition, and available diagnostic and imaging results. Reviewed care plans. Consultant agrees with plans as outlined. He will admit patient.      LABORATORY TESTS:  Recent Results (from the past 12 hour(s))   EKG, 12 LEAD, INITIAL    Collection Time: 09/03/17  3:39 PM   Result Value Ref Range    Ventricular Rate 67 BPM    Atrial Rate 67 BPM    P-R Interval 138 ms    QRS Duration 86 ms    Q-T Interval 428 ms    QTC Calculation (Bezet) 452 ms    Calculated P Axis 67 degrees    Calculated R Axis 26 degrees    Calculated T Axis 65 degrees    Diagnosis       Normal sinus rhythm  Nonspecific ST abnormality  Abnormal ECG  When compared with ECG of 25-JAN-2016 12:51,  ST no longer depressed in Lateral leads     URINALYSIS W/ REFLEX CULTURE    Collection Time: 09/03/17  4:24 PM   Result Value Ref Range    Color YELLOW/STRAW      Appearance CLEAR CLEAR      Specific gravity 1.017 1.003 - 1.030      pH (UA) 6.0 5.0 - 8.0      Protein NEGATIVE  NEG mg/dL    Glucose NEGATIVE  NEG mg/dL    Ketone NEGATIVE  NEG mg/dL    Bilirubin NEGATIVE  NEG      Blood NEGATIVE  NEG      Urobilinogen 1.0 0.2 - 1.0 EU/dL    Nitrites NEGATIVE  NEG      Leukocyte Esterase NEGATIVE  NEG      WBC 0-4 0 - 4 /hpf    RBC 0-5 0 - 5 /hpf    Epithelial cells MODERATE (A) FEW /lpf    Bacteria NEGATIVE  NEG /hpf    UA:UC IF INDICATED CULTURE NOT INDICATED BY UA RESULT CNI     LACTIC ACID    Collection Time: 09/03/17  4:44 PM   Result Value Ref Range    Lactic acid 2.9 (HH) 0.4 - 2.0 MMOL/L   CBC WITH AUTOMATED DIFF    Collection Time: 09/03/17  4:44 PM   Result Value Ref Range    WBC 11.7 (H) 3.6 - 11.0 K/uL    RBC 4.03 3.80 - 5.20 M/uL    HGB 11.0 (L) 11.5 - 16.0 g/dL    HCT 34.1 (L) 35.0 - 47.0 %    MCV 84.6 80.0 - 99.0 FL    MCH 27.3 26.0 - 34.0 PG    MCHC 32.3 30.0 - 36.5 g/dL    RDW 15.7 (H) 11.5 - 14.5 %    PLATELET 532 108 - 189 K/uL    NEUTROPHILS 84 (H) 32 - 75 %    LYMPHOCYTES 10 (L) 12 - 49 %    MONOCYTES 5 5 - 13 %    EOSINOPHILS 1 0 - 7 %    BASOPHILS 0 0 - 1 %    ABS. NEUTROPHILS 9.9 (H) 1.8 - 8.0 K/UL    ABS. LYMPHOCYTES 1.1 0.8 - 3.5 K/UL    ABS. MONOCYTES 0.6 0.0 - 1.0 K/UL    ABS. EOSINOPHILS 0.1 0.0 - 0.4 K/UL    ABS. BASOPHILS 0.0 0.0 - 0.1 K/UL   CK W/ REFLX CKMB    Collection Time: 09/03/17  4:44 PM   Result Value Ref Range    CK 95 26 - 870 U/L   METABOLIC PANEL, COMPREHENSIVE    Collection Time: 09/03/17  4:44 PM   Result Value Ref Range    Sodium 136 136 - 145 mmol/L    Potassium 5.1 3.5 - 5.1 mmol/L    Chloride 102 97 - 108 mmol/L    CO2 27 21 - 32 mmol/L    Anion gap 7 5 - 15 mmol/L    Glucose 247 (H) 65 - 100 mg/dL    BUN 31 (H) 6 - 20 MG/DL    Creatinine 1.50 (H) 0.55 - 1.02 MG/DL    BUN/Creatinine ratio 21 (H) 12 - 20      GFR est AA 39 (L) >60 ml/min/1.73m2    GFR est non-AA 32 (L) >60 ml/min/1.73m2    Calcium 8.3 (L) 8.5 - 10.1 MG/DL    Bilirubin, total 0.4 0.2 - 1.0 MG/DL    ALT (SGPT) 22 12 - 78 U/L    AST (SGOT) 15 15 - 37 U/L    Alk.  phosphatase 145 (H) 45 - 117 U/L    Protein, total 7.1 6.4 - 8.2 g/dL    Albumin 2.8 (L) 3.5 - 5.0 g/dL    Globulin 4.3 (H) 2.0 - 4.0 g/dL    A-G Ratio 0.7 (L) 1.1 - 2.2     TROPONIN I    Collection Time: 09/03/17  4:44 PM   Result Value Ref Range    Troponin-I, Qt. <0.04 <0.05 ng/mL       IMAGING RESULTS:  CT ABD PELV WO CONT   Final Result   EXAM:  CT ABD PELV WO CONT     INDICATION: recent hx of SBO with diarrhea now and syncope     COMPARISON5/28/2017     CONTRAST:  None.     TECHNIQUE:   Thin axial images were obtained through the abdomen and pelvis. Coronal and  sagittal reconstructions were generated. Oral contrast was not administered. CT  dose reduction was achieved through use of a standardized protocol tailored for  this examination and automatic exposure control for dose modulation.      The absence of intravenous contrast material reduces the sensitivity for  evaluation of the solid parenchymal organs of the abdomen.      FINDINGS:   An bases show small infiltrate with pleural reaction on the right and some  atelectasis on the left. Liver and spleen are normal in size. Prior  cholecystectomy, prior anterior abdominal wall surgery. The kidneys appeared  unobstructed. The pancreas does appear unremarkable. There is no definite bowel  wall thickening or obstruction seen. Large amount of fecal stasis in the  rectosigmoid. The bladder is not filled and cannot be evaluated.     IMPRESSION   impression: Recent surgery, no definite evidence for obstruction. Fecal stasis  in the rectosigmoid. XR CHEST PORT   Final Result   Clinical indication: Chest pain.     Portable AP semierect view of the chest is obtained compared to January 25, 2016.     The a heart size is normal. There is some increased density at the left lung  base more prominent than on the prior examination but likely relate to pleural  thickening.     IMPRESSION   impression: Left lung base pleural thickening.    XR CHEST PORT    (Results Pending)       MEDICATIONS GIVEN:  Medications   sodium chloride 0.9 % bolus infusion 500 mL (0 mL IntraVENous IV Completed 9/3/17 1814)   metroNIDAZOLE (FLAGYL) IVPB premix 500 mg (500 mg IntraVENous New Bag 9/3/17 1707)   levoFLOXacin (LEVAQUIN) 750 mg in D5W IVPB (0 mg IntraVENous IV Completed 9/3/17 1837)   acetaminophen (TYLENOL) tablet 650 mg (650 mg Oral Given 9/3/17 1735)   sodium chloride 0.9 % bolus infusion 500 mL (0 mL IntraVENous IV Completed 9/3/17 1908)       IMPRESSION:  1. Syncope and collapse    2. Dehydration    3. FADY (acute kidney injury) ([facility]    4. Lactic acidosis        PLAN:  1. Admit    ADMIT NOTE:  6:42 PM  Patient is being admitted to the hospital by Dr. Gregory Moreno. The results of their tests and reasons for their admission have been discussed with them and/or available family. They convey agreement and understanding for the need to be admitted and for their admission diagnosis. Consultation has been made with the inpatient physician specialist for hospitalization. Attestation Note:  This note is prepared by [facility], acting as Scribe for MD Pushpa Durham MD: The scribe's documentation has been prepared under my direction and personally reviewed by me in its entirety. I confirm that the note above accurately reflects all work, treatment, procedures, and medical decision making performed by me.

## 2017-09-03 NOTE — IP AVS SNAPSHOT
[address] [facility] 
[phone] Patient: Raven Chavis MRN: [id number] OXI:[id number] Current Discharge Medication List  
  
CONTINUE these medications which have CHANGED Dose & Instructions Dispensing Information Comments Morning Noon Evening Bedtime  
 polyethylene glycol 17 gram packet Commonly known as:  Vernel Redder What changed:  when to take this Your last dose was: Your next dose is:    
   
   
 Dose:  17 g Take 1 Packet by mouth two (2) times a day. Quantity:  30 Each Refills:  0 CONTINUE these medications which have NOT CHANGED Dose & Instructions Dispensing Information Comments Morning Noon Evening Bedtime  
 acetaminophen 325 mg tablet Commonly known as:  TYLENOL Your last dose was: Your next dose is:    
   
   
 Dose:  325 mg Take 325 mg by mouth every six (6) hours as needed for Pain or Fever. Refills:  0  
     
   
   
   
  
 amLODIPine 10 mg tablet Commonly known as:  Mitchell Zhao Your last dose was: Your next dose is:    
   
   
 Dose:  10 mg Take 10 mg by mouth daily. Refills:  0  
     
   
   
   
  
 aspirin 81 mg chewable tablet Your last dose was: Your next dose is:    
   
   
 Dose:  81 mg Take 1 Tab by mouth daily. Refills:  0  
     
   
   
   
  
 docusate sodium 100 mg capsule Commonly known as:  Laruth Eden Your last dose was: Your next dose is:    
   
   
 Dose:  100 mg Take 100 mg by mouth daily. Refills:  0  
     
   
   
   
  
 levothyroxine 50 mcg tablet Commonly known as:  SYNTHROID Your last dose was: Your next dose is:    
   
   
 Dose:  50 mcg Take 50 mcg by mouth Daily (before breakfast). Refills:  0  
     
   
   
   
  
 lisinopril 10 mg tablet Commonly known as:  Tildon Margaret Your last dose was: Your next dose is:    
   
   
 Dose:  10 mg Take 10 mg by mouth daily. Refills:  0  
     
   
   
   
  
 metFORMIN 500 mg tablet Commonly known as:  GLUCOPHAGE Your last dose was: Your next dose is:    
   
   
 Dose:  500 mg Take 500 mg by mouth daily. Refills:  0 Mineral Oil Drop Your last dose was: Your next dose is:    
   
   
 Dose:  2 Drop  
2 Drops by Otic route every seven (7) days. Refills:  0  
     
   
   
   
  
 OYSTER SHELL CALCIUM 500 PO Your last dose was: Your next dose is:    
   
   
 Dose:  500 mg Take 500 mg by mouth daily. Refills:  0 REMERON 15 mg tablet Generic drug:  mirtazapine Your last dose was: Your next dose is:    
   
   
 Dose:  15 mg Take 15 mg by mouth nightly. Indications: appetite Refills:  0  
     
   
   
   
  
 risperiDONE 0.25 mg tablet Commonly known as:  RisperDAL Your last dose was: Your next dose is:    
   
   
 Dose:  0.25 mg Take 0.25 mg by mouth two (2) times a day. Indications: mood swings Refills:  0  
     
   
   
   
  
 VITAMIN B-12 1,000 mcg tablet Generic drug:  cyanocobalamin Your last dose was: Your next dose is:    
   
   
 Dose:  1000 mcg Take 1,000 mcg by mouth daily. Refills:  0  
     
   
   
   
  
 VITAMIN D2 50,000 unit capsule Generic drug:  ergocalciferol Your last dose was: Your next dose is:    
   
   
 Dose:  53555 Units Take 50,000 Units by mouth every seven (7) days. Refills:  0  
     
   
   
   
  
 vitamin e 1,000 unit capsule Commonly known as:  E GEMS Your last dose was: Your next dose is:    
   
   
 Dose:  1000 Units Take 1,000 Units by mouth daily. Refills:  0 Where to Get Your Medications Information on where to get these meds will be given to you by the nurse or doctor. ! Ask your nurse or doctor about these medications  
  polyethylene glycol 17 gram packet

## 2017-09-04 LAB
ALBUMIN SERPL-MCNC: 2.1 G/DL (ref 3.5–5)
ALBUMIN/GLOB SERPL: 0.6 {RATIO} (ref 1.1–2.2)
ALP SERPL-CCNC: 99 U/L (ref 45–117)
ALT SERPL-CCNC: 15 U/L (ref 12–78)
ANION GAP SERPL CALC-SCNC: 4 MMOL/L (ref 5–15)
AST SERPL-CCNC: 13 U/L (ref 15–37)
ATRIAL RATE: 67 BPM
BASOPHILS # BLD: 0 K/UL (ref 0–0.1)
BASOPHILS NFR BLD: 0 % (ref 0–1)
BILIRUB SERPL-MCNC: 0.3 MG/DL (ref 0.2–1)
BUN SERPL-MCNC: 28 MG/DL (ref 6–20)
BUN/CREAT SERPL: 27 (ref 12–20)
CALCIUM SERPL-MCNC: 7.3 MG/DL (ref 8.5–10.1)
CALCULATED P AXIS, ECG09: 67 DEGREES
CALCULATED R AXIS, ECG10: 26 DEGREES
CALCULATED T AXIS, ECG11: 65 DEGREES
CHLORIDE SERPL-SCNC: 110 MMOL/L (ref 97–108)
CO2 SERPL-SCNC: 26 MMOL/L (ref 21–32)
CREAT SERPL-MCNC: 1.04 MG/DL (ref 0.55–1.02)
CRP SERPL-MCNC: 9.35 MG/DL (ref 0–0.6)
DIAGNOSIS, 93000: NORMAL
EOSINOPHIL # BLD: 0.1 K/UL (ref 0–0.4)
EOSINOPHIL NFR BLD: 1 % (ref 0–7)
ERYTHROCYTE [DISTWIDTH] IN BLOOD BY AUTOMATED COUNT: 15.7 % (ref 11.5–14.5)
GLOBULIN SER CALC-MCNC: 3.3 G/DL (ref 2–4)
GLUCOSE BLD STRIP.AUTO-MCNC: 102 MG/DL (ref 65–100)
GLUCOSE BLD STRIP.AUTO-MCNC: 112 MG/DL (ref 65–100)
GLUCOSE BLD STRIP.AUTO-MCNC: 94 MG/DL (ref 65–100)
GLUCOSE SERPL-MCNC: 121 MG/DL (ref 65–100)
HCT VFR BLD AUTO: 26.5 % (ref 35–47)
HGB BLD-MCNC: 8.8 G/DL (ref 11.5–16)
LACTATE SERPL-SCNC: 1.3 MMOL/L (ref 0.4–2)
LACTATE SERPL-SCNC: 2.3 MMOL/L (ref 0.4–2)
LYMPHOCYTES # BLD: 1.5 K/UL (ref 0.8–3.5)
LYMPHOCYTES NFR BLD: 11 % (ref 12–49)
MAGNESIUM SERPL-MCNC: 1.7 MG/DL (ref 1.6–2.4)
MCH RBC QN AUTO: 27.5 PG (ref 26–34)
MCHC RBC AUTO-ENTMCNC: 33.2 G/DL (ref 30–36.5)
MCV RBC AUTO: 82.8 FL (ref 80–99)
MONOCYTES # BLD: 1.1 K/UL (ref 0–1)
MONOCYTES NFR BLD: 8 % (ref 5–13)
NEUTS SEG # BLD: 10.5 K/UL (ref 1.8–8)
NEUTS SEG NFR BLD: 80 % (ref 32–75)
P-R INTERVAL, ECG05: 138 MS
PLATELET # BLD AUTO: 257 K/UL (ref 150–400)
POTASSIUM SERPL-SCNC: 4.2 MMOL/L (ref 3.5–5.1)
PROT SERPL-MCNC: 5.4 G/DL (ref 6.4–8.2)
Q-T INTERVAL, ECG07: 428 MS
QRS DURATION, ECG06: 86 MS
QTC CALCULATION (BEZET), ECG08: 452 MS
RBC # BLD AUTO: 3.2 M/UL (ref 3.8–5.2)
SERVICE CMNT-IMP: ABNORMAL
SERVICE CMNT-IMP: ABNORMAL
SERVICE CMNT-IMP: NORMAL
SODIUM SERPL-SCNC: 140 MMOL/L (ref 136–145)
TSH SERPL DL<=0.05 MIU/L-ACNC: 1.75 UIU/ML (ref 0.36–3.74)
VENTRICULAR RATE, ECG03: 67 BPM
WBC # BLD AUTO: 13.1 K/UL (ref 3.6–11)

## 2017-09-04 PROCEDURE — 83605 ASSAY OF LACTIC ACID: CPT | Performed by: INTERNAL MEDICINE

## 2017-09-04 PROCEDURE — 74011250636 HC RX REV CODE- 250/636: Performed by: STUDENT IN AN ORGANIZED HEALTH CARE EDUCATION/TRAINING PROGRAM

## 2017-09-04 PROCEDURE — 82962 GLUCOSE BLOOD TEST: CPT

## 2017-09-04 PROCEDURE — 80053 COMPREHEN METABOLIC PANEL: CPT | Performed by: STUDENT IN AN ORGANIZED HEALTH CARE EDUCATION/TRAINING PROGRAM

## 2017-09-04 PROCEDURE — 74011250636 HC RX REV CODE- 250/636: Performed by: INTERNAL MEDICINE

## 2017-09-04 PROCEDURE — 83735 ASSAY OF MAGNESIUM: CPT | Performed by: STUDENT IN AN ORGANIZED HEALTH CARE EDUCATION/TRAINING PROGRAM

## 2017-09-04 PROCEDURE — 85025 COMPLETE CBC W/AUTO DIFF WBC: CPT | Performed by: STUDENT IN AN ORGANIZED HEALTH CARE EDUCATION/TRAINING PROGRAM

## 2017-09-04 PROCEDURE — 83605 ASSAY OF LACTIC ACID: CPT | Performed by: STUDENT IN AN ORGANIZED HEALTH CARE EDUCATION/TRAINING PROGRAM

## 2017-09-04 PROCEDURE — 65660000000 HC RM CCU STEPDOWN

## 2017-09-04 PROCEDURE — 84443 ASSAY THYROID STIM HORMONE: CPT | Performed by: STUDENT IN AN ORGANIZED HEALTH CARE EDUCATION/TRAINING PROGRAM

## 2017-09-04 PROCEDURE — 74011250637 HC RX REV CODE- 250/637: Performed by: STUDENT IN AN ORGANIZED HEALTH CARE EDUCATION/TRAINING PROGRAM

## 2017-09-04 PROCEDURE — 74011250637 HC RX REV CODE- 250/637: Performed by: INTERNAL MEDICINE

## 2017-09-04 PROCEDURE — 77030011256 HC DRSG MEPILEX <16IN NO BORD MOLN -A

## 2017-09-04 PROCEDURE — 86140 C-REACTIVE PROTEIN: CPT | Performed by: STUDENT IN AN ORGANIZED HEALTH CARE EDUCATION/TRAINING PROGRAM

## 2017-09-04 PROCEDURE — 65270000029 HC RM PRIVATE

## 2017-09-04 PROCEDURE — 36415 COLL VENOUS BLD VENIPUNCTURE: CPT | Performed by: INTERNAL MEDICINE

## 2017-09-04 RX ORDER — MINERAL OIL
30 OIL (ML) ORAL 2 TIMES DAILY
Status: DISCONTINUED | OUTPATIENT
Start: 2017-09-04 | End: 2017-09-05

## 2017-09-04 RX ORDER — POLYETHYLENE GLYCOL 3350 17 G/17G
17 POWDER, FOR SOLUTION ORAL 3 TIMES DAILY
Status: DISCONTINUED | OUTPATIENT
Start: 2017-09-05 | End: 2017-09-07 | Stop reason: HOSPADM

## 2017-09-04 RX ORDER — RISPERIDONE 0.25 MG/1
0.25 TABLET, FILM COATED ORAL 2 TIMES DAILY
COMMUNITY
End: 2018-03-20

## 2017-09-04 RX ORDER — INSULIN LISPRO 100 [IU]/ML
INJECTION, SOLUTION INTRAVENOUS; SUBCUTANEOUS EVERY 6 HOURS
Status: DISCONTINUED | OUTPATIENT
Start: 2017-09-04 | End: 2017-09-07 | Stop reason: HOSPADM

## 2017-09-04 RX ORDER — ERGOCALCIFEROL 1.25 MG/1
50000 CAPSULE ORAL
COMMUNITY
End: 2018-03-20

## 2017-09-04 RX ORDER — TRAMADOL HYDROCHLORIDE 50 MG/1
50 TABLET ORAL
Status: DISCONTINUED | OUTPATIENT
Start: 2017-09-04 | End: 2017-09-07 | Stop reason: HOSPADM

## 2017-09-04 RX ORDER — MENTHOL
1000 GEL (GRAM) TOPICAL DAILY
COMMUNITY
End: 2018-03-20

## 2017-09-04 RX ORDER — LEVOFLOXACIN 5 MG/ML
500 INJECTION, SOLUTION INTRAVENOUS EVERY 24 HOURS
Status: DISCONTINUED | OUTPATIENT
Start: 2017-09-04 | End: 2017-09-04 | Stop reason: DRUGHIGH

## 2017-09-04 RX ORDER — LEVOFLOXACIN 5 MG/ML
750 INJECTION, SOLUTION INTRAVENOUS
Status: DISCONTINUED | OUTPATIENT
Start: 2017-09-04 | End: 2017-09-05

## 2017-09-04 RX ORDER — ACETAMINOPHEN 10 MG/ML
1000 INJECTION, SOLUTION INTRAVENOUS
Status: DISCONTINUED | OUTPATIENT
Start: 2017-09-04 | End: 2017-09-04

## 2017-09-04 RX ADMIN — LEVOFLOXACIN 750 MG: 5 INJECTION, SOLUTION INTRAVENOUS at 15:54

## 2017-09-04 RX ADMIN — MEMANTINE HYDROCHLORIDE 10 MG: 10 TABLET ORAL at 17:54

## 2017-09-04 RX ADMIN — SODIUM CHLORIDE 1000 ML: 900 INJECTION, SOLUTION INTRAVENOUS at 03:51

## 2017-09-04 RX ADMIN — MINERAL OIL 30 ML: 99.9 LIQUID ORAL; TOPICAL at 17:57

## 2017-09-04 RX ADMIN — RISPERIDONE 0.25 MG: 0.25 TABLET ORAL at 17:54

## 2017-09-04 RX ADMIN — SODIUM CHLORIDE 100 ML/HR: 900 INJECTION, SOLUTION INTRAVENOUS at 05:03

## 2017-09-04 RX ADMIN — ACETAMINOPHEN 650 MG: 325 TABLET ORAL at 16:04

## 2017-09-04 RX ADMIN — ENOXAPARIN SODIUM 25 MG: 60 INJECTION SUBCUTANEOUS at 21:34

## 2017-09-04 RX ADMIN — LORAZEPAM 1 MG: 2 INJECTION INTRAMUSCULAR; INTRAVENOUS at 23:36

## 2017-09-04 RX ADMIN — SODIUM CHLORIDE 100 ML/HR: 900 INJECTION, SOLUTION INTRAVENOUS at 15:53

## 2017-09-04 NOTE — PROGRESS NOTES
Pharmacy  LMWH Monitoring     Enoxaparin Indication: dvt prophylaxis  Current Dose: 30 mg daily  Creatinine Clearance: 19  Recent Labs      09/03/17   1644   CREA  1.50*   BUN  31*   HGB  11.0*   PLT  310     Wt Readings from Last 1 Encounters:   09/03/17 51.7 kg (114 lb)   Body mass index is 19.57 kg/(m^2).     Impression/Plan: dose reduced to 25 mg (0.5 mg/kg) every 24 hours for weight <60 kg     Thanks,  Christine Chavez, PHARMD

## 2017-09-04 NOTE — PROGRESS NOTES
Bedside shift change report given to Dimitri Schofield RN (oncoming nurse) by Saskia Mendoza RN (offgoing nurse). Report included the following information SBAR, Kardex, MAR, Recent Results and Cardiac Rhythm NSR.

## 2017-09-04 NOTE — PROGRESS NOTES
Hospitalist Progress Note   Patient Name  Tania Dey   Admit date:  9/3/2017   Medical Record Number  [id number]    Age  80 y.o. Date of Birth 9/10/1922   PCP PROVIDER UNKNOWN    Room Number  2284/01 [facility]     Admission Diagnoses:  Convulsive syncope     Assessment/ Plan:     Convulsive syncope  Probably vasovagal syncope with myoclonic jerks  -patient with marked stool retention and had a vasovagal episode while straining to have a BM  -telemetry monitoring     Leucocytosis WCC 12-> 13  -unsure if she has a infection or reaction to her constipation  -repeat  CXR to rule out PNA   -on levoflox/metronidazole      Constipation  Overflow fecal incontinence  -per nursing , pt had 2 large BMs this am with the enemas  -this constipation was prob precipitated by recent opiates for her RLE #  -when she can tolerate po, hopefully tomorrow, will be able to take the miralax and mineral oil that was ordered  -CT abdomen no definite evidence for obstruction. Fecal stasis in the rectosigmoid. Will allow some ativan prn.       FADY Cr 1.5-> 1.0  Dehydration  Hx HTN, now Hypotensive  Lactic acidosis, downtrending 2.9-> 2.3  -will hold lisinopril and norvasc  -IVF hydration, Cr normalised  -lactate now normalised    RLE #  -consulted ortho for cast      DM  -hold metformin due to FADY.  Consider stopping due to her age related CKD. -check A1c,   SSI prn      Hx CVA (suspected embolic in 9955)  -continue ASA and coumadin.        Hypothyroidism  -continue synthroid  -TSH 1.7      Dementia worsened since CVA 5 yrs ago  -continue namenda, risperidal  -hold remeron for now (given for appetite)      Recent right foot fracture  Hx rib fracture and left olecranon fracture    WCC 12/ Cr 1.5  CXR ?  Meniscus at LL base        Code Status: [facility]  Surrogate Decision Maker:  Daughter       DVT Prophylaxis:  Lovenox  GI Prophylaxis: not indicated      Baseline:   Lives in a dementia unit.  Does not need can or walker  ALEJO:    Principal Problem:    Convulsive syncope (9/3/2017)      Body mass index is 19.57 kg/(m^2). Functional Status  poor   CODE STATUS  DNR   Surrogate decision maker: Zain Singh  Lovenox   Discharge Plan: Home w/Family,       [place]or: [place] MED[place] / Plan: [place] MEDICARE PART A & B / Product Type: Medicare /    Query No queries noted (chunky button not showing )        Subjective:   \"drowsy , confused\"        Objective:     Physical Exam:    Gen:  Drowsy, poorly communicative, disoriented  HEENT:  Pink conjunctivae, PERRL, hearing intact to voice, moist mucous membranes  Neck:  Supple, without masses, thyroid non-tender  Resp:  No accessory muscle use, clear breath sounds without wheezes rales or rhonchi  Card:  No murmurs, normal S1, S2 without thrills, bruits or peripheral edema  Abd:  Soft, non-tender, non-distended, normoactive bowel sounds are present, no palpable organomegaly  Lymph:  No cervical adenopathy  Musc:RLE cast  Skin:  No rashes or ulcers, skin turgor is good  Neuro:  Cranial nerves 3-12 are grossly intact,  strength is 5/5 bilaterally, dorsi / plantarflexion strength is 5/5 bilaterally, follows commands appropriately  Psych:  Alert with good insight. Oriented to person, place, and time       09/04 0701 - 09/04 1900  In: 2090 [I.V.:2090]  Out: -     Intake/Output Summary (Last 24 hours) at 09/04/17 1812  Last data filed at 09/04/17 1627   Gross per 24 hour   Intake             2090 ml   Output                0 ml   Net             2090 ml    Weight change: Wt Readings from Last 10 Encounters:   09/03/17 51.7 kg (114 lb)   05/28/17 52.1 kg (114 lb 13.8 oz)   01/06/17 52.2 kg (115 lb)   04/11/16 51.5 kg (113 lb 8.6 oz)   01/25/16 56.7 kg (125 lb)   12/20/15 45.4 kg (100 lb 1.4 oz)   09/22/12 45.4 kg (100 lb 1 oz)   09/19/12 48.1 kg (106 lb)   03/04/12 54.4 kg (120 lb)        Relevant informations:      Other medical conditions listed in this following active hospital problem list section; all of these and other pertinent data were taken into consideration when treatment plan is developed and customized to this patient's unique overall circumstances and needs.    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    Closed right ankle fracture 08/25/2017    Hypoxemia 01/25/2016    Closed olecranon fracture 01/25/2016    UTI (lower urinary tract infection) 01/25/2016    Dementia 01/25/2016    Fall 09/15/2012    Hyponatremia 09/15/2012    [facility] 09/15/2012          Data Review:   Recent Days:  Recent Labs      09/04/17   1058  09/03/17   1644   WBC  13.1*  11.7*   HGB  8.8*  11.0*   HCT  26.5*  34.1*   PLT  257  310     Recent Labs      09/04/17   1058  09/03/17   1644   NA  140  136   K  4.2  5.1   CL  110*  102   CO2  26  27   GLU  121*  247*   BUN  28*  31*   CREA  1.04*  1.50*   CA  7.3*  8.3*   MG  1.7   --    ALB  2.1*  2.8*   TBILI  0.3  0.4   SGOT  13*  15   ALT  15  22     Lab Results   Component Value Date/Time    TSH 1.75 09/04/2017 10:58 AM     ______________________________________________________________________________________________________    Medications reviewed     Current Facility-Administered Medications   Medication Dose Route Frequency    mineral oil 30 mL  30 mL Oral BID    [START ON 9/5/2017] polyethylene glycol (MIRALAX) packet 17 g  17 g Oral TID    insulin lispro (HUMALOG) injection   SubCUTAneous Q6H    levoFLOXacin (LEVAQUIN) 750 mg in D5W IVPB  750 mg IntraVENous Q48H    traMADol (ULTRAM) tablet 50 mg  50 mg Oral Q6H PRN    aspirin chewable tablet 81 mg  81 mg Oral DAILY    levothyroxine (SYNTHROID) tablet 50 mcg  50 mcg Oral ACB    memantine (NAMENDA) tablet 10 mg  10 mg Oral BID    risperiDONE (RisperDAL) tablet 0.25 mg  0.25 mg Oral BID    0.9% sodium chloride infusion  100 mL/hr IntraVENous CONTINUOUS    acetaminophen (TYLENOL) tablet 650 mg  650 mg Oral Q4H PRN    ondansetron [facility]) injection 4 mg  4 mg IntraVENous Q4H PRN    LORazepam (ATIVAN) injection 1 mg  1 mg IntraVENous Q6H PRN    enoxaparin (LOVENOX) partial dose injection 25 mg  25 mg SubCUTAneous Q24H       ______________________________________________________________________________________________________  Care Plan discussed with: Patient/Family  ____________________________________________________________________________________________________    High complexity decision making was performed for this patient who is at high risk for decompensation with multiple organ involvement. Today total floor/unit time was 30 minutes while caring for this patient and greater than 50% of that time was spent with patient (and/or family) discussing patients clinical issues.   ______________________________________________________________________________________________________  Colie Clamp MD                      9/4/2017

## 2017-09-04 NOTE — PROGRESS NOTES
Spoke with MD regarding medications for patient this AM.  Patient is lethargic and unsafe to swallow at this time. She gets extremely agitated, pushing staff away when trying to assess her/wake her up. \"Let me sleep! Leave me alone! \"  Daughter is at bedside. Orders also rec'd for SSI and accuchecks q6hrs while NPO. Order rec'd to give tap water enema at 1pm today. Will continue to monitor patient for improvement in mentation for medication administration.

## 2017-09-04 NOTE — PROGRESS NOTES
TRANSFER - IN REPORT:    Verbal report received from [facility], RN(name) on Dain Kovacs  being received from ED(unit) for routine progression of care      Report consisted of patients Situation, Background, Assessment and   Recommendations(SBAR). Information from the following report(s) SBAR, Kardex, ED Summary, Procedure Summary, Intake/Output, MAR, Accordion and Recent Results was reviewed with the receiving nurse. Opportunity for questions and clarification was provided. Assessment completed upon patients arrival to unit and care assumed. Primary Nurse Pallavi Huff and Maritza Will RN performed a dual skin assessment on this patient Impairment noted- see wound doc flow sheet  Stage 2 pressure ulcer to sacrym, mepilex applied  Stage 1 ulcer to R buttock  Cast to RLE, recent ankle fx  Bernard score is 14    2230  Daughter, Omar Precious at bedside. Able to complete admission database per Kathi. Unable to complete home med rec. Daughter request that pharmacy call [facility] in [place] where pt resides to complete. Will pass on to day shift. Daughter had copy of advanced directive. Made copy and placed on pt's chart    2335  Tap water enema with moderate amount of stool. Upon entering pt's room pt had moderate amount of loose stool before starting    0320  Critical Lactic acid 2.3 report by Ryan Gómez in lab. MD Dowling paged  Orders received to give 1L bolus NS, redraw Lactic @ 0700    0615  Daughter, Sjsue Lang updated on plan of care    9314  2nd tap water enema.  Moderate amount of stool passed

## 2017-09-04 NOTE — PROGRESS NOTES
Problem: Falls - Risk of  Goal: *Absence of Falls  Document Rose Fall Risk and appropriate interventions in the flowsheet.    Outcome: Progressing Towards Goal  Fall Risk Interventions:  Mobility Interventions: Bed/chair exit alarm, Communicate number of staff needed for ambulation/transfer     Mentation Interventions: Bed/chair exit alarm, Adequate sleep, hydration, pain control, Door open when patient unattended, Evaluate medications/consider consulting pharmacy, Family/sitter at bedside, More frequent rounding, Reorient patient, Room close to nurse's station, Update white board, Toileting rounds     Medication Interventions: Bed/chair exit alarm, Evaluate medications/consider consulting pharmacy     Elimination Interventions: Bed/chair exit alarm, Call light in reach, Patient to call for help with toileting needs, Toileting schedule/hourly rounds     History of Falls Interventions: Bed/chair exit alarm, Door open when patient unattended, Evaluate medications/consider consulting pharmacy, Room close to nurse's station, Investigate reason for fall

## 2017-09-04 NOTE — ED NOTES
TRANSFER - OUT REPORT:    Verbal report given to JEANNETTE Worthington(name) on Raven Ibarra  being transferred to [facility](unit) for routine progression of care       Report consisted of patients Situation, Background, Assessment and   Recommendations(SBAR). Information from the following report(s) SBAR, ED Summary, Procedure Summary, Intake/Output, MAR and Recent Results was reviewed with the receiving nurse. Lines:   Peripheral IV 09/03/17 Right Antecubital (Active)   Site Assessment Clean, dry, & intact 9/3/2017  4:51 PM   Phlebitis Assessment 0 9/3/2017  4:51 PM   Infiltration Assessment 0 9/3/2017  4:17 PM   Dressing Status Clean, dry, & intact 9/3/2017  4:51 PM   Dressing Type Transparent 9/3/2017  4:51 PM   Hub Color/Line Status Pink 9/3/2017  4:51 PM        Opportunity for questions and clarification was provided.       Patient transported with:   Registered Nurse  Tech

## 2017-09-04 NOTE — PROGRESS NOTES
Patient is much more alert, though remains confused at baseline. She is talking and interactive, but still does not want staff to turn her or do much with her. She was able to swallow 2 Tylenol with some sips of water. Swallowed without choking/coughing, loud large gulping sounds heard with each swallow. Patient feels warm to the touch and remarked herself that she has a fever. Axillary temp remains WNL. Will monitor. Daughter at bedside.

## 2017-09-04 NOTE — PROGRESS NOTES
Initial Nutrition Assessment:    INTERVENTIONS/RECOMMENDATIONS:   · Regular diet  · Add ensure once diet advanced    ASSESSMENT:   Chart reviewed, medically noted for FADY, dementia, Overflow fecal incontinence, h/o CVA and PMH shown below. Nutrition referral triggered due to Stage 2 pressure ulcer to sacrym, Stage 1 ulcer to R buttock. Pt currently NPO, will address the importance of protein for wound healing once diet is advanced. Past Medical History:   Diagnosis Date    Diabetes ([facility]     Diabetes mellitus ([facility]     Diabetic acidosis, type II ([facility]     Hypertension     Hypothyroid     Hypothyroidism        Diet Order: NPO  % Eaten:  No data found. Pertinent Medications: [x]Reviewed: (NS, miralax, remeron PTA for appetite)  Pertinent Labs: [x]Reviewed:   Food Allergies: [x]NKFA  []Other   Last BM:  9/4     Pressure Ulcer:                [x] Stage I   [x] Stage II   [] Stage III   [] Stage IV  Edema:                []RUE   []LUE   []RLE   []LLE    Wt Readings from Last 30 Encounters:   09/03/17 51.7 kg (114 lb)   05/28/17 52.1 kg (114 lb 13.8 oz)   01/06/17 52.2 kg (115 lb)   04/11/16 51.5 kg (113 lb 8.6 oz)   01/25/16 56.7 kg (125 lb)   12/20/15 45.4 kg (100 lb 1.4 oz)   09/22/12 45.4 kg (100 lb 1 oz)   09/19/12 48.1 kg (106 lb)   03/04/12 54.4 kg (120 lb)       Anthropometrics:   Height: 5' 4\" (162.6 cm) Weight: 51.7 kg (114 lb)   IBW (%IBW):   ( ) UBW (%UBW):   (  %)   Last Weight Metrics:  Weight Loss Metrics 9/3/2017 5/28/2017 1/6/2017 4/11/2016 1/25/2016 12/20/2015 9/22/2012   Today's Wt 114 lb 114 lb 13.8 oz 115 lb 113 lb 8.6 oz 125 lb 100 lb 1.4 oz 100 lb 1 oz   BMI 19.57 kg/m2 19.11 kg/m2 19.14 kg/m2 18.89 kg/m2 20.8 kg/m2 16.66 kg/m2 16.65 kg/m2       BMI: Body mass index is 19.57 kg/(m^2).     This BMI is indicative of:   []Underweight    [x]Normal    []Overweight    [] Obesity   [] Extreme Obesity (BMI>40)     Estimated Nutrition Needs (Based on):   1175 Kcals/day (MSJ: 900 x 1.3) , 50 g (1 g/kg) Protein  Carbohydrate: At Least 130 g/day  Fluids: 1175 mL/day (1ml/kcal) or per primary team    NUTRITION DIAGNOSES:   Problem:  Increased nutrient needs (protein )      Etiology: related to wound healing      Signs/Symptoms: as evidenced by Stage 2 pressure ulcer to sacrym, Stage 1 ulcer to R buttock      NUTRITION INTERVENTIONS:  Meals/Snacks: General/healthful diet   Supplements: Commercial supplement              GOAL:   diet advancement in 24-48 hrs    LEARNING NEEDS (Diet, Food/Nutrient-Drug Interaction):    [x] None Identified   [] Identified and Education Provided/Documented   [] Identified and Pt declined/was not appropriate     Cultureal, Evangelical, OR Ethnic Dietary Needs:    [x] None Identified   [] Identified and Addressed     [x] Interdisciplinary Care Plan Reviewed/Documented    [x] Discharge Planning:    General healthy diet with adequate protein to promote wound healing     MONITORING /EVALUATION:      Food/Nutrient Intake Outcomes:  Total energy intake  Physical Signs/Symptoms Outcomes: Weight/weight change, Electrolyte and renal profile, GI profile, GI, Glucose profile    NUTRITION RISK:    [] High              [x] Moderate           []  Low  []  Minimal/Uncompromised    PT SEEN FOR:    []  MD Consult: []Calorie Count      []Diabetic Diet Education        []Diet Education     []Electrolyte Management     []General Nutrition Management and Supplements     []Management of Tube Feeding     []TPN Recommendations    [x]  RN Referral:  []MST score >=2     []Enteral/Parenteral Nutrition PTA     []Pregnant: Gestational DM or Multigestation     [x]Pressure Ulcer/Wound Care needs        []  Low BMI  []  DTR Referral       Marva Carpio RDN  Pager [phone]  Weekend Pager [phone]

## 2017-09-04 NOTE — PROGRESS NOTES
[address] SHIFT NURSING NOTE    Bedside and Verbal shift change report given to [address] (oncoming nurse) by Kaylan Martin (offgoing nurse). Report included the following information SBAR, Kardex, Intake/Output, MAR, Accordion, Recent Results, Med Rec Status and Cardiac Rhythm nsr. SHIFT SUMMARY:   3327: tap water enema given no resulting stool. Pt tolerated enema very poorly Pt was agitated. Will continue to monitor  1525: Talked with lab about Pt C reactive protein. Lab stated that they had enough blood to test         Admission Date 9/3/2017   Admission Diagnosis Syncope and collapse   Consults None        Consults   [] PT   [] OT   [] Speech   [] Palliative      [] Hospice    [] Case Management   [] None   Cardiac Monitoring   [x] Yes   [] No     Antibiotics   [x] Yes   [] No   GI Prophylaxis  (Ex: Protonix, Pepcid, etc,.)   [] Yes   [x] No          DVT Prophylaxis   SCDs:             Michael stockings:         [x] Medication (Ex: Lovenox, Eliquis, Brilinta, Coumadin,  Heparin, etc..)   [] Contraindicated   [] No VTE needed       Urinary Catheter             LDAs               Peripheral IV 09/03/17 Right Antecubital (Active)   Site Assessment Clean, dry, & intact 9/4/2017  8:50 AM   Phlebitis Assessment 0 9/4/2017  8:50 AM   Infiltration Assessment 0 9/4/2017  8:50 AM   Dressing Status Clean, dry, & intact 9/4/2017  8:50 AM   Dressing Type Tape;Transparent 9/4/2017  8:50 AM   Hub Color/Line Status Pink; Infusing 9/4/2017  8:50 AM                      I/Os   Intake/Output Summary (Last 24 hours) at 09/04/17 1234  Last data filed at 09/04/17 0850   Gross per 24 hour   Intake          1178.33 ml   Output                0 ml   Net          1178.33 ml         Activity Level Activity Level: Bed Rest     Activity Assistance: Complete care   Diet Active Orders   Diet    DIET NPO Except Meds      Purposeful Rounding every 1-2 hour?    [x] Yes    Rose Score  Total Score: 4   Bed Alarm (If score 3 or >)   [x] Yes    [] Refused (See signed refusal form in chart)   Bernard Score  Bernard Score: 13       Bernard Score (if score 14 or less)   [] PMT consult   [x] Nutrition consult   [x] Wound Care consult      []  Specialty bed         Influenza Vaccine Received Flu Vaccine for Current Season (usually Sept-March): Not Flu Season               Needs prior to discharge:   Home O2 required:    [] Yes   [x] No     If yes, how much O2 required? Other:    Last Bowel Movement Date: 09/04/17   Readmission Risk Assessment Tool Score High Risk            22       Total Score        3 Has Seen PCP in Last 6 Months (Yes=3, No=0)    2 . Living with Significant Other. Assisted Living. LTAC. SNF. or   Rehab    5 Pt.  Coverage (Medicare=5 , Medicaid, or Self-Pay=4)    12 Charlson Comorbidity Score (Age + Comorbid Conditions)        Criteria that do not apply:    Patient Length of Stay (>5 days = 3)    IP Visits Last 12 Months (1-3=4, 4=9, >4=11)       Expected Length of Stay - - -   Actual Length of Stay 1

## 2017-09-04 NOTE — ED NOTES
Cleaned pt of watery stool. Turned pt. Pt tolerated, but was expressing her dislike about us moving her and cleaning her up. Pt keeps stating she just wants to be left alone to sleep.

## 2017-09-05 ENCOUNTER — APPOINTMENT (OUTPATIENT)
Dept: GENERAL RADIOLOGY | Age: 82
DRG: 312 | End: 2017-09-05
Attending: INTERNAL MEDICINE
Payer: MEDICARE

## 2017-09-05 ENCOUNTER — APPOINTMENT (OUTPATIENT)
Dept: GENERAL RADIOLOGY | Age: 82
DRG: 312 | End: 2017-09-05
Attending: NURSE PRACTITIONER
Payer: MEDICARE

## 2017-09-05 LAB
GLUCOSE BLD STRIP.AUTO-MCNC: 138 MG/DL (ref 65–100)
GLUCOSE BLD STRIP.AUTO-MCNC: 77 MG/DL (ref 65–100)
GLUCOSE BLD STRIP.AUTO-MCNC: 85 MG/DL (ref 65–100)
GLUCOSE BLD STRIP.AUTO-MCNC: 88 MG/DL (ref 65–100)
SERVICE CMNT-IMP: ABNORMAL
SERVICE CMNT-IMP: NORMAL

## 2017-09-05 PROCEDURE — 74011250637 HC RX REV CODE- 250/637

## 2017-09-05 PROCEDURE — 74011000250 HC RX REV CODE- 250: Performed by: INTERNAL MEDICINE

## 2017-09-05 PROCEDURE — 73600 X-RAY EXAM OF ANKLE: CPT

## 2017-09-05 PROCEDURE — 74011250637 HC RX REV CODE- 250/637: Performed by: STUDENT IN AN ORGANIZED HEALTH CARE EDUCATION/TRAINING PROGRAM

## 2017-09-05 PROCEDURE — 71010 XR CHEST PORT: CPT

## 2017-09-05 PROCEDURE — 65660000000 HC RM CCU STEPDOWN

## 2017-09-05 PROCEDURE — 65270000029 HC RM PRIVATE

## 2017-09-05 PROCEDURE — 82962 GLUCOSE BLOOD TEST: CPT

## 2017-09-05 PROCEDURE — 74011000258 HC RX REV CODE- 258

## 2017-09-05 PROCEDURE — 74011250636 HC RX REV CODE- 250/636: Performed by: INTERNAL MEDICINE

## 2017-09-05 PROCEDURE — 74011250637 HC RX REV CODE- 250/637: Performed by: INTERNAL MEDICINE

## 2017-09-05 RX ORDER — MAGNESIUM SULFATE 100 %
CRYSTALS MISCELLANEOUS
Status: DISPENSED
Start: 2017-09-05 | End: 2017-09-05

## 2017-09-05 RX ORDER — MENTHOL
1000 GEL (GRAM) TOPICAL DAILY
Status: DISCONTINUED | OUTPATIENT
Start: 2017-09-06 | End: 2017-09-07 | Stop reason: HOSPADM

## 2017-09-05 RX ORDER — METRONIDAZOLE 500 MG/100ML
500 INJECTION, SOLUTION INTRAVENOUS EVERY 8 HOURS
Status: DISCONTINUED | OUTPATIENT
Start: 2017-09-05 | End: 2017-09-07 | Stop reason: HOSPADM

## 2017-09-05 RX ORDER — LEVOFLOXACIN 5 MG/ML
750 INJECTION, SOLUTION INTRAVENOUS
Status: DISCONTINUED | OUTPATIENT
Start: 2017-09-05 | End: 2017-09-07 | Stop reason: HOSPADM

## 2017-09-05 RX ORDER — DEXTROSE MONOHYDRATE 100 MG/ML
INJECTION, SOLUTION INTRAVENOUS
Status: COMPLETED
Start: 2017-09-05 | End: 2017-09-05

## 2017-09-05 RX ORDER — MIRTAZAPINE 15 MG/1
15 TABLET, FILM COATED ORAL
Status: DISCONTINUED | OUTPATIENT
Start: 2017-09-05 | End: 2017-09-07 | Stop reason: HOSPADM

## 2017-09-05 RX ORDER — SENNOSIDES 8.6 MG/1
1 TABLET ORAL DAILY
Status: DISCONTINUED | OUTPATIENT
Start: 2017-09-05 | End: 2017-09-07 | Stop reason: HOSPADM

## 2017-09-05 RX ADMIN — ENOXAPARIN SODIUM 60 MG: 60 INJECTION SUBCUTANEOUS at 22:11

## 2017-09-05 RX ADMIN — POLYETHYLENE GLYCOL 3350 17 G: 17 POWDER, FOR SOLUTION ORAL at 16:29

## 2017-09-05 RX ADMIN — RISPERIDONE 0.25 MG: 0.25 TABLET ORAL at 09:33

## 2017-09-05 RX ADMIN — LORAZEPAM 1 MG: 2 INJECTION INTRAMUSCULAR; INTRAVENOUS at 16:25

## 2017-09-05 RX ADMIN — MIRTAZAPINE 15 MG: 15 TABLET, FILM COATED ORAL at 22:09

## 2017-09-05 RX ADMIN — DEXTROSE MONOHYDRATE 125 ML: 10 INJECTION, SOLUTION INTRAVENOUS at 06:35

## 2017-09-05 RX ADMIN — LORAZEPAM 1 MG: 2 INJECTION INTRAMUSCULAR; INTRAVENOUS at 15:24

## 2017-09-05 RX ADMIN — ASPIRIN 81 MG 81 MG: 81 TABLET ORAL at 09:33

## 2017-09-05 RX ADMIN — LEVOTHYROXINE SODIUM 50 MCG: 50 TABLET ORAL at 09:33

## 2017-09-05 RX ADMIN — SENNOSIDES 8.6 MG: 8.6 TABLET, FILM COATED ORAL at 13:34

## 2017-09-05 RX ADMIN — MINERAL OIL 30 ML: 99.9 LIQUID ORAL; TOPICAL at 09:33

## 2017-09-05 RX ADMIN — SODIUM CHLORIDE 100 ML/HR: 900 INJECTION, SOLUTION INTRAVENOUS at 03:15

## 2017-09-05 RX ADMIN — LEVOFLOXACIN 750 MG: 5 INJECTION, SOLUTION INTRAVENOUS at 13:33

## 2017-09-05 RX ADMIN — RISPERIDONE 0.25 MG: 0.25 TABLET ORAL at 18:51

## 2017-09-05 RX ADMIN — SODIUM CHLORIDE 100 ML/HR: 900 INJECTION, SOLUTION INTRAVENOUS at 13:06

## 2017-09-05 RX ADMIN — METRONIDAZOLE 500 MG: 500 INJECTION, SOLUTION INTRAVENOUS at 22:11

## 2017-09-05 RX ADMIN — MEMANTINE HYDROCHLORIDE 10 MG: 10 TABLET ORAL at 18:51

## 2017-09-05 RX ADMIN — MEMANTINE HYDROCHLORIDE 10 MG: 10 TABLET ORAL at 09:34

## 2017-09-05 NOTE — PROGRESS NOTES
Cassi Gaines is a 80 yr old female who presents to ER via EMS post a syncopal episode and has been admitted for W/U and for overflow fecal incontinence and dehydration/FADY. Patient has a history of DM, CVA, hypothyroidism and dementia and a recent R foot FX. Patient is on the memory unit. Therapy consults are pending. Will follow and help coordinate return to assisted living.      PMhx significant for colitis, hypothyroid, DM, and HTN         Care Management Interventions  Transition of Care Consult (CM Consult): Assisted Living ([facility])  MyChart Signup: No  Discharge Durable Medical Equipment: No  Physical Therapy Consult: Yes  Occupational Therapy Consult: Yes  Speech Therapy Consult: No  Current Support Network: Assisted Living

## 2017-09-05 NOTE — PROGRESS NOTES
Received bedside report from JEANNETTE Worthington    4:30 PM     Paged Dr. Adri Kelley as patient as still agitated after getting a dose of ativan 1 mg. MD ordered to give her one more dose of 2 mg IV ativan.

## 2017-09-05 NOTE — CDMP QUERY
Dr. John Orozco :  Please clarify if this patient is being treated/managed for:    =>pressure ulcer mar 2 to sacrum and stage 1 to right buttock as noted by RN/nutritional consult  =>Other Explanation of clinical findings  =>Unable to Determine (no explanation of clinical findings)    The medical record reflects the following clinical findings, treatment, and risk factors:    Risk Factors: 81 yo presents with syncope  Clinical Indicators: Nutrition referral triggered due to Stage 2 pressure ulcer to sacrum, Stage 1 ulcer to R buttock. Pt currently NPO, BMI 19.5  Treatment: Ensure, education to address the importance of protein for wound healing     Please clarify and document your clinical opinion in the progress notes and discharge summary including the definitive and/or presumptive diagnosis, (suspected or probable), related to the above clinical findings. Please include clinical findings supporting your diagnosis. Thank Jenny Gr RN [address] [place] [address]; [facility];[phone]

## 2017-09-05 NOTE — PROGRESS NOTES
Pressure Ulcer Prevention Alert Received for Bernard < 14 (moderate risk).        Care Plan/Interventions for Nursin. Complete Bernard Pressure Ulcer Risk Scale and use sub scores to identify appropriate interventions. 2. Perform Assessment: skin, changes in LOC, visual cues for pain, monitor skin under medical devices  3. Respond to Reduced Sensory Perception: changes in LOC, check visual cues for pain, float heels, suspension boots, pressure redistribution bed/mattress/chair cushion, turning and reposition approximately every 2 hours (pillows & wedges), pad between skin to skin, turn & reposition  4. Manage Moisture: absorbent under pads, internal / external urinary device, internal /  external fecal device, minimize layers, contain wound drainage, access need for specialty bed, limit adult briefs, maintain skin hydration (lotion/cream), moisture barrier, offer toileting every hour  5. Promote Activity: increase time out of bed, chair cushion, PT/OT evaluation, trapeze to reposition, pressure redistribution bed/mattress/chair  6. Address Reduced Mobility: float heels / suspension boot, HOB 30 degrees or less, pressure redistribution bed/mattress/cushion, PT / OT evaluation, turn and reposition approximately every 2 hours (pillows & wedges)  7. Promote Nutrition: document food / fluid / supplement intake, encourage/assist with meals as needed  8. Reduce Friction and Shear: transferring/repositioning devices (lift/draw sheet), lift team/ patient mobility team, feet elevated on foot rest, minimize layers, foam dressing / transparent film / skin sealants, protective barrier creams and emollients, transfer aides (board, Lambert lift, ceiling lift, stand assist), HOB 30 degrees or less, trapeze to reposition.   Wound Care Team

## 2017-09-05 NOTE — WOUND CARE
Wound care consult from staff nurse for \"sacral wound\" POA. Patient is a confused 79 y/o CF with blanchable erythema to sacrum. She is incontinent of urine and stool and has some incontinence MASD. Recommend:  Sacrum buttocks: cleanse with soap and water when soiled. Apply Venelex ointment BID. Leave skin open to air (PHOEBE).   John Du RN, CWON, zone ph# [phone]

## 2017-09-05 NOTE — PROGRESS NOTES
Report received from JEANNETTE Do and Melissa Brewster RN. SBAR, Kardex, Procedure Summary, Intake/Output, MAR, Accordion and Recent Results were discussed. [address] Anders in place for pt's safety    0625  Patient's BS 77. 125 mL bolus of Dextrose 10 given per protocol.    Recheck BS [address]  Report given to Lillie Rasmussen

## 2017-09-05 NOTE — CONSULTS
ORTHOPAEDIC CONSULT NOTE    Subjective:     Date of Consultation:  September 5, 2017      Raven Maldonado is a 80 y.o. female who is being seen for Pt was seen and splinted in the ED 1 week ago for lula fracture of R ankle. Pt has been admitted for syncope with consult for continued fracture. Pt's family at bedside during exam. Plan of care discussed with pt and family, all questions/concerns addressed and pt and family verbalized understanding of plan of care. 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    Closed right ankle fracture 08/25/2017    Hypoxemia 01/25/2016    Closed olecranon fracture 01/25/2016    UTI (lower urinary tract infection) 01/25/2016    Dementia 01/25/2016    Fall 09/15/2012    Hyponatremia 09/15/2012    Diabetes mellitus ([address] 09/15/2012     Family History   Problem Relation Age of Onset    Diabetes Father     Cancer Mother      ovarian       Social History   Substance Use Topics    Smoking status: Never Smoker    Smokeless tobacco: Never Used    Alcohol use No     Past Medical History:   Diagnosis Date    Diabetes ([address]     Diabetes mellitus ([address]     Diabetic acidosis, type II ([address]     Hypertension     Hypothyroid     Hypothyroidism       Past Surgical History:   Procedure Laterality Date    HX APPENDECTOMY      HX CHOLECYSTECTOMY      HX HYSTERECTOMY        Prior to Admission medications    Medication Sig Start Date End Date Taking? Authorizing Provider   Mineral Oil drop 2 Drops by Otic route every seven (7) days. 2 drops each ear canal   Yes Historical Provider   risperiDONE (RISPERDAL) 0.25 mg tablet Take 0.25 mg by mouth two (2) times a day. Indications: mood swings   Yes Historical Provider   CALCIUM CARBONATE (OYSTER SHELL CALCIUM 500 PO) Take 500 mg by mouth daily. Yes Historical Provider   ergocalciferol (VITAMIN D2) 50,000 unit capsule Take 50,000 Units by mouth every seven (7) days.    Yes Historical Provider   vitamin e (E GEMS) 1,000 unit capsule Take 1,000 Units by mouth daily. Yes Historical Provider   mirtazapine (REMERON) 15 mg tablet Take 15 mg by mouth nightly. Suyapa Hung MD   metFORMIN (GLUCOPHAGE) 500 mg tablet Take 500 mg by mouth two (2) times daily (with meals). Suyapa Hung MD   HYDROcodone-acetaminophen (NORCO) 5-325 mg per tablet Take 1 Tab by mouth every eight (8) hours as needed for Pain. Max Daily Amount: 3 Tabs. 8/25/17   Dimitri Bee MD   amLODIPine (NORVASC) 10 mg tablet Take 10 mg by mouth daily. Historical Provider   docusate sodium (COLACE) 100 mg capsule Take 100 mg by mouth daily. Historical Provider   lisinopril (PRINIVIL, ZESTRIL) 10 mg tablet Take 10 mg by mouth daily. Historical Provider   memantine (NAMENDA) 10 mg tablet Take 10 mg by mouth two (2) times a day. Historical Provider   multivitamin (ONE A DAY) tablet Take 1 Tab by mouth daily. Historical Provider   aspirin 81 mg chewable tablet Take 1 Tab by mouth daily. 9/19/12   Leah Hewitt MD   levothyroxine (SYNTHROID) 50 mcg tablet Take 50 mcg by mouth Daily (before breakfast). Suyapa Hung MD   cyanocobalamin (VITAMIN B-12) 1,000 mcg tablet Take 1,000 mcg by mouth every other day. Suyapa Hung MD   polyethylene glycol (MIRALAX) 17 gram packet Take 17 g by mouth daily.     Suyapa Hung MD     Current Facility-Administered Medications   Medication Dose Route Frequency    glucose 4 gram chewable tablet        levoFLOXacin (LEVAQUIN) 750 mg in D5W IVPB  750 mg IntraVENous Q48H    senna (SENOKOT) tablet 8.6 mg  1 Tab Oral DAILY    [START ON 9/6/2017] vitamin e (E GEMS) capsule 1,000 Units  1,000 Units Oral DAILY    mirtazapine (REMERON) tablet 15 mg  15 mg Oral QHS    polyethylene glycol (MIRALAX) packet 17 g  17 g Oral TID    insulin lispro (HUMALOG) injection   SubCUTAneous Q6H    traMADol (ULTRAM) tablet 50 mg  50 mg Oral Q6H PRN    aspirin chewable tablet 81 mg  81 mg Oral DAILY    levothyroxine (SYNTHROID) tablet 50 mcg  50 mcg Oral ACB    memantine (NAMENDA) tablet 10 mg  10 mg Oral BID    risperiDONE (RisperDAL) tablet 0.25 mg  0.25 mg Oral BID    0.9% sodium chloride infusion  100 mL/hr IntraVENous CONTINUOUS    acetaminophen (TYLENOL) tablet 650 mg  650 mg Oral Q4H PRN    ondansetron (ZOFRAN) injection 4 mg  4 mg IntraVENous Q4H PRN    LORazepam (ATIVAN) injection 1 mg  1 mg IntraVENous Q6H PRN    enoxaparin (LOVENOX) partial dose injection 25 mg  25 mg SubCUTAneous Q24H      Allergies   Allergen Reactions    Demerol [Meperidine] Unknown (comments)    Morphine Unknown (comments)    Penicillins Unknown (comments)    Demerol [Meperidine] Other (comments)     Unsure of reaction    Morphine Other (comments)     Unsure of reaction    Pcn [Penicillins] Other (comments)     unknown        Review of Systems:  A comprehensive review of systems was negative except for that written in the HPI. Mental Status: no dementia    Objective:     Patient Vitals for the past 8 hrs:   BP Temp Pulse Resp SpO2   17 1100 157/63 98.1 °F (36.7 °C) 73 16 96 %   17 0805 139/57 97.9 °F (36.6 °C) 71 16 92 %     Temp (24hrs), Av °F (36.7 °C), Min:97.6 °F (36.4 °C), Max:98.4 °F (36.9 °C)      Gen: Well-developed,  in no acute distress    Musc: RLE - min edema, no skin breakdown noted, NIV    Skin: No skin breakdown noted.  Skin warm, pink, dry  Neuro: Cranial nerves are grossly intact, no focal motor weakness, follows commands appropriately   Psych: Good insight, oriented to person, place and time, alert    Imaging Review: new images pending     Labs:   Recent Results (from the past 24 hour(s))   GLUCOSE, POC    Collection Time: 17  6:50 PM   Result Value Ref Range    Glucose (POC) 102 (H) 65 - 100 mg/dL    Performed by Erika Rodney (PCT)    GLUCOSE, POC    Collection Time: 17 11:32 PM   Result Value Ref Range    Glucose (POC) 94 65 - 100 mg/dL    Performed by Annette Preethi, POC    Collection Time: 09/05/17  6:24 AM   Result Value Ref Range    Glucose (POC) 77 65 - 100 mg/dL    Performed by Donny Dunn    GLUCOSE, POC    Collection Time: 09/05/17  7:06 AM   Result Value Ref Range    Glucose (POC) 138 (H) 65 - 100 mg/dL    Performed by Donny Dunn    GLUCOSE, POC    Collection Time: 09/05/17 11:44 AM   Result Value Ref Range    Glucose (POC) 85 65 - 100 mg/dL    Performed by Unkown           Impression:     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 09/15/2012     Principal Problem:    Convulsive syncope (9/3/2017)        Plan:   -  Pt is stable orthopaedically, Non-Operative management at this time  -  Short Leg cast was placed to RLE  -  PT/OT - NWB RLE   -  Repeat XR pending with plan for pt to follow up with Dr. Roberson in 1 week      Pt seen with Dr. Medhat Valencia, NP  Orthopedic Nurse Practitioner   [place]

## 2017-09-06 LAB
ANION GAP SERPL CALC-SCNC: 9 MMOL/L (ref 5–15)
BUN SERPL-MCNC: 13 MG/DL (ref 6–20)
BUN/CREAT SERPL: 20 (ref 12–20)
CALCIUM SERPL-MCNC: 7.8 MG/DL (ref 8.5–10.1)
CHLORIDE SERPL-SCNC: 108 MMOL/L (ref 97–108)
CO2 SERPL-SCNC: 23 MMOL/L (ref 21–32)
CREAT SERPL-MCNC: 0.65 MG/DL (ref 0.55–1.02)
ERYTHROCYTE [DISTWIDTH] IN BLOOD BY AUTOMATED COUNT: 15.6 % (ref 11.5–14.5)
GLUCOSE BLD STRIP.AUTO-MCNC: 105 MG/DL (ref 65–100)
GLUCOSE BLD STRIP.AUTO-MCNC: 124 MG/DL (ref 65–100)
GLUCOSE BLD STRIP.AUTO-MCNC: 204 MG/DL (ref 65–100)
GLUCOSE BLD STRIP.AUTO-MCNC: 86 MG/DL (ref 65–100)
GLUCOSE BLD STRIP.AUTO-MCNC: 94 MG/DL (ref 65–100)
GLUCOSE SERPL-MCNC: 107 MG/DL (ref 65–100)
HCT VFR BLD AUTO: 30.7 % (ref 35–47)
HGB BLD-MCNC: 10 G/DL (ref 11.5–16)
MCH RBC QN AUTO: 26.9 PG (ref 26–34)
MCHC RBC AUTO-ENTMCNC: 32.6 G/DL (ref 30–36.5)
MCV RBC AUTO: 82.5 FL (ref 80–99)
PLATELET # BLD AUTO: 335 K/UL (ref 150–400)
POTASSIUM SERPL-SCNC: 3.6 MMOL/L (ref 3.5–5.1)
RBC # BLD AUTO: 3.72 M/UL (ref 3.8–5.2)
SERVICE CMNT-IMP: ABNORMAL
SERVICE CMNT-IMP: NORMAL
SERVICE CMNT-IMP: NORMAL
SODIUM SERPL-SCNC: 140 MMOL/L (ref 136–145)
WBC # BLD AUTO: 9.4 K/UL (ref 3.6–11)

## 2017-09-06 PROCEDURE — 82962 GLUCOSE BLOOD TEST: CPT

## 2017-09-06 PROCEDURE — 65270000029 HC RM PRIVATE

## 2017-09-06 PROCEDURE — 74011250636 HC RX REV CODE- 250/636: Performed by: INTERNAL MEDICINE

## 2017-09-06 PROCEDURE — 36415 COLL VENOUS BLD VENIPUNCTURE: CPT | Performed by: INTERNAL MEDICINE

## 2017-09-06 PROCEDURE — 85027 COMPLETE CBC AUTOMATED: CPT | Performed by: INTERNAL MEDICINE

## 2017-09-06 PROCEDURE — 74011250637 HC RX REV CODE- 250/637: Performed by: INTERNAL MEDICINE

## 2017-09-06 PROCEDURE — 74011000250 HC RX REV CODE- 250: Performed by: INTERNAL MEDICINE

## 2017-09-06 PROCEDURE — 74011250637 HC RX REV CODE- 250/637: Performed by: STUDENT IN AN ORGANIZED HEALTH CARE EDUCATION/TRAINING PROGRAM

## 2017-09-06 PROCEDURE — 80048 BASIC METABOLIC PNL TOTAL CA: CPT | Performed by: INTERNAL MEDICINE

## 2017-09-06 RX ORDER — ACETAMINOPHEN 325 MG/1
325 TABLET ORAL
COMMUNITY
End: 2017-11-30

## 2017-09-06 RX ADMIN — METRONIDAZOLE 500 MG: 500 INJECTION, SOLUTION INTRAVENOUS at 21:06

## 2017-09-06 RX ADMIN — METRONIDAZOLE 500 MG: 500 INJECTION, SOLUTION INTRAVENOUS at 15:55

## 2017-09-06 RX ADMIN — ENOXAPARIN SODIUM 25 MG: 60 INJECTION SUBCUTANEOUS at 20:19

## 2017-09-06 RX ADMIN — CASTOR OIL AND BALSAM, PERU: 788; 87 OINTMENT TOPICAL at 16:06

## 2017-09-06 RX ADMIN — METRONIDAZOLE 500 MG: 500 INJECTION, SOLUTION INTRAVENOUS at 06:10

## 2017-09-06 RX ADMIN — MIRTAZAPINE 15 MG: 15 TABLET, FILM COATED ORAL at 20:20

## 2017-09-06 RX ADMIN — POLYETHYLENE GLYCOL 3350 17 G: 17 POWDER, FOR SOLUTION ORAL at 18:44

## 2017-09-06 RX ADMIN — SODIUM CHLORIDE 100 ML/HR: 900 INJECTION, SOLUTION INTRAVENOUS at 21:06

## 2017-09-06 NOTE — PROGRESS NOTES
Bedside and Verbal shift change report given to Charly Mosqueda (oncoming nurse) by Jermaine Thomas (offgoing nurse). Report included the following information SBAR, Kardex, Procedure Summary, Intake/Output, MAR, Accordion and Recent Results.

## 2017-09-06 NOTE — PROGRESS NOTES
Physical Therapy:    Orders received and chart reviewed. Attempted to see patient for PT evaluation, however per RN report patient is still resting after an eventful night and received ativan. Will follow up once pt is more alert.  Thank you    Renita Riedel, PT, DPT

## 2017-09-06 NOTE — PROGRESS NOTES
Physical Therapy:    Attempted to see patient for PT evaluation, however patient easily agitated when attempting to raise [facility] or take covers off to assess LEs. Patient yelling \"stop that! \"  Patient was unable to provide prior level of function. Pt's daughter not present in room to provide any prior level of function. Will follow up tomorrow.  Thank you    Elier Spears, PT, DPT

## 2017-09-06 NOTE — PROGRESS NOTES
Problem: Falls - Risk of  Goal: *Absence of Falls  Document Rose Fall Risk and appropriate interventions in the flowsheet.    Outcome: Progressing Towards Goal  Fall Risk Interventions:  Mobility Interventions: Bed/chair exit alarm, Communicate number of staff needed for ambulation/transfer, OT consult for ADLs, Patient to call before getting OOB, PT Consult for mobility concerns, PT Consult for assist device competence     Mentation Interventions: Adequate sleep, hydration, pain control, Bed/chair exit alarm, Evaluate medications/consider consulting pharmacy, Door open when patient unattended, Eyeglasses and hearing aids, Family/sitter at bedside, More frequent rounding, Reorient patient, Room close to nurse's station, Toileting rounds, Update white board     Medication Interventions: Bed/chair exit alarm, Evaluate medications/consider consulting pharmacy, Patient to call before getting OOB, Teach patient to arise slowly     Elimination Interventions: Bed/chair exit alarm, Call light in reach, Patient to call for help with toileting needs, Toilet paper/wipes in reach, Toileting schedule/hourly rounds     History of Falls Interventions: Bed/chair exit alarm, Door open when patient unattended, Evaluate medications/consider consulting pharmacy, Investigate reason for fall, Room close to nurse's station

## 2017-09-06 NOTE — PROGRESS NOTES
[address] SHIFT NURSING NOTE    Bedside shift change report given to Barry Mckeon RN (oncoming nurse) by Leidy Laboy and Alanna MACHADO (offgoing nurse).  Report included the following information SBAR, Intake/Output, MAR and Cardiac Rhythm NS.    SHIFT SUMMARY: Pt is no longer on tele        Admission Date 9/3/2017   Admission Diagnosis Syncope and collapse   Consults IP CONSULT TO ORTHOPEDIC SURGERY        Consults   [] PT   [] OT   [] Speech   [] Palliative      [] Hospice    [] Case Management   [] None   Cardiac Monitoring   [] Yes   [x] No     Antibiotics   [x] Yes   [] No   GI Prophylaxis  (Ex: Protonix, Pepcid, etc,.)   [] Yes   [x] No          DVT Prophylaxis   SCDs:             Michael stockings:         [] Medication (Ex: Lovenox, Eliquis, Brilinta, Coumadin,  Heparin, etc..)   [] Contraindicated   [] No VTE needed       Urinary Catheter       External Female Catheter 09/04/17-Urine Output (mL): 600 ml     LDAs               Peripheral IV 09/04/17 Right;Upper Arm (Active)   Site Assessment Clean, dry, & intact 9/6/2017  4:15 PM   Phlebitis Assessment 0 9/6/2017  4:15 PM   Infiltration Assessment 0 9/6/2017  4:15 PM   Dressing Status Clean, dry, & intact 9/6/2017  4:15 PM   Dressing Type Tape;Transparent 9/6/2017  4:15 PM   Hub Color/Line Status Blue 9/6/2017  4:15 PM          External Female Catheter 09/04/17 (Active)   Site Assessment Clean, dry, & intact 9/6/2017  5:04 PM   Repositioned Yes 9/6/2017  5:04 PM   Perineal Care Yes 9/6/2017  5:04 PM   Wick Changed Yes 9/6/2017  5:04 PM   Suction Canister/Tubing Changed No 9/6/2017  5:04 PM   Urine Output (mL) 600 ml 9/6/2017  5:04 PM                I/Os   Intake/Output Summary (Last 24 hours) at 09/06/17 1945  Last data filed at 09/06/17 1704   Gross per 24 hour   Intake              220 ml   Output             3350 ml   Net            -3130 ml         Activity Level Activity Level: Bed Rest     Activity Assistance: Complete care   Diet Active Orders   Diet    DIET REGULAR Purposeful Rounding every 1-2 hour? [x] Yes    Rose Score  Total Score: 4   Bed Alarm (If score 3 or >)   [x] Yes    [] Refused (See signed refusal form in chart)   Bernard Score  Bernard Score: 13       Bernard Score (if score 14 or less)   [] PMT consult   [] Nutrition consult   [] Wound Care consult      []  Specialty bed         Influenza Vaccine Received Flu Vaccine for Current Season (usually Sept-March): Not Flu Season               Needs prior to discharge:   Home O2 required:    [] Yes   [] No     If yes, how much O2 required? Other:    Last Bowel Movement Date: 09/04/17   Readmission Risk Assessment Tool Score High Risk            22       Total Score        3 Has Seen PCP in Last 6 Months (Yes=3, No=0)    2 . Living with Significant Other. Assisted Living. LTAC. SNF. or   Rehab    5 Pt.  Coverage (Medicare=5 , Medicaid, or Self-Pay=4)    12 Charlson Comorbidity Score (Age + Comorbid Conditions)        Criteria that do not apply:    Patient Length of Stay (>5 days = 3)    IP Visits Last 12 Months (1-3=4, 4=9, >4=11)       Expected Length of Stay 2d 9h   Actual Length of Stay 3

## 2017-09-06 NOTE — PROGRESS NOTES
Hospitalist Progress Note    NAME: Raven Medina   :  9/10/1922   MRN:  [id number]   LOS:   3      Assessment / Plan:  Convulsive syncope  Probably vasovagal syncope with myoclonic jerks  -patient with marked stool retention and had a vasovagal episode while straining to have a BM  -avoid narcotics  -plan for d/c back to dementia unit in next 1-2 days      Leukocytosis - resolved       Constipation  Overflow fecal incontinence  -has had BMs here  -will continue miralax, add senna  -CT abdomen no definite evidence for obstruction. Fecal stasis in the rectosigmoid. Right medial and lateral malleoli fracture  -appreciate ortho evaluation, cast applied today  -recommend PT/OT -- NWB on RLE    FADY Cr 1.5-> 1.0  Dehydration  Hx HTN, now Hypotensive  Lactic acidosis, downtrending 2.9-> 2.3  -will hold lisinopril and norvasc  -IVF hydration, Cr normalised  -lactate now normalised  -start diet today      DM  -hold metformin due to FADY.  Consider stopping due to her age related CKD. -check A1c, SSI prn      Hx CVA (suspected embolic in )  -continue ASA and coumadin.         Hypothyroidism  -continue synthroid  -TSH 1.7      Dementia worsened since CVA 5 yrs ago  -continue namenda, risperidal  -restart remeron      Hx rib fracture and left olecranon fracture    Stage 2 sacral ulcer, poa      Code Status:   [facility]  Surrogate Decision Maker:  Daughter       DVT Prophylaxis:  Lovenox  GI Prophylaxis: not indicated    Baseline:   Lives in a dementia unit.  Does not need cane or walker       Subjective:     Chief Complaint: constipation  Sleeping today, had ativan yesterday, says she has pain all over    Objective:     VITALS:   Last 24hrs VS reviewed since prior progress note.  Most recent are:  Patient Vitals for the past 24 hrs:   Temp Pulse Resp BP SpO2   17 1158 98.1 °F (36.7 °C) 94 18 161/66 95 %   17 0824 98 °F (36.7 °C) 85 18 159/65 97 %   17 0352 97.8 °F (36.6 °C) 90 20 155/65 97 % 09/06/17 0011 97.9 °F (36.6 °C) 83 20 154/66 97 %   09/05/17 1957 98.3 °F (36.8 °C) (!) 110 20 154/86 94 %   09/05/17 1531 98.1 °F (36.7 °C) 77 18 132/57 93 %       Intake/Output Summary (Last 24 hours) at 09/06/17 1332  Last data filed at 09/06/17 0740   Gross per 24 hour   Intake              360 ml   Output             2700 ml   Net            -2340 ml        PHYSICAL EXAM:  General: Sleeping but wakes, no acute distress    EENT:  EOMI. Anicteric sclerae. Mucous membranes moist  Resp:  CTA bilaterally, no wheezing or rales. No accessory muscle use  CV:  Regular rhythm, no edema  GI:  Soft, + distended, non tender. +Bowel sounds  Neurologic:  Alert not oriented normal speech  Psych:   Poor  insight, not anxious nor agitated  Skin:  No rashes, no jaundice  ________________________________________________________________________  Care Plan discussed with:    Comments   Patient x    Family      RN x    Care Manager     Consultant                        Multidiciplinary team rounds were held today with , nursing, pharmacist and clinical coordinator. Patient's plan of care was discussed; medications were reviewed and discharge planning was addressed. ________________________________________________________________________  Total NON critical care TIME:  30   Minutes    >50% of visit spent in counseling and coordination of care       ________________________________________________________________________  Stark Goltz, MD     Procedures: see electronic medical records for all procedures/Xrays and details which were not copied into this note but were reviewed prior to creation of Plan. LABS:  I reviewed today's most current labs and imaging studies.   Pertinent labs include:  Recent Labs      09/06/17 0425 09/04/17   1058  09/03/17   1644   WBC  9.4  13.1*  11.7*   HGB  10.0*  8.8*  11.0*   HCT  30.7*  26.5*  34.1*   PLT  335  257  310     Recent Labs      09/06/17 0425 09/04/17   1058 09/03/17   1644   NA  140  140  136   K  3.6  4.2  5.1   CL  108  110*  102   CO2  23  26  27   GLU  107*  121*  247*   BUN  13  28*  31*   CREA  0.65  1.04*  1.50*   CA  7.8*  7.3*  8.3*   MG   --   1.7   --    ALB   --   2.1*  2.8*   TBILI   --   0.3  0.4   SGOT   --   13*  15   ALT   --   15  22       Signed: Sharri Mathews MD

## 2017-09-06 NOTE — PROGRESS NOTES
Pharmacy Medication Reconciliation     Allergy Update: n/a    Recommendations/Findings: The following amendments were made to the patient's active medication list on file at [facility]:   1) Additions: Acetaminophen 325 mg PO Q6hr prn for pain or fever    2) Deletions: Hydocodone/APAP, MVI, memantine    3) Changes: Metformin 500 mg BID changed to 500 mg daily      -Clarified PTA med list with patient's medical record/medication list from [address] [place] [address]. PTA medication list was corrected to the following:     Prior to Admission Medications   Prescriptions Last Dose Informant Patient Reported? Taking? CALCIUM CARBONATE (OYSTER SHELL CALCIUM 500 PO)   Yes Yes   Sig: Take 500 mg by mouth daily. Mineral Oil drop   Yes Yes   Si Drops by Otic route every seven (7) days. acetaminophen (TYLENOL) 325 mg tablet   Yes Yes   Sig: Take 325 mg by mouth every six (6) hours as needed for Pain or Fever. amLODIPine (NORVASC) 10 mg tablet   Yes Yes   Sig: Take 10 mg by mouth daily. aspirin 81 mg chewable tablet   Yes Yes   Sig: Take 1 Tab by mouth daily. cyanocobalamin (VITAMIN B-12) 1,000 mcg tablet   Yes Yes   Sig: Take 1,000 mcg by mouth daily. docusate sodium (COLACE) 100 mg capsule   Yes Yes   Sig: Take 100 mg by mouth daily. ergocalciferol (VITAMIN D2) 50,000 unit capsule   Yes Yes   Sig: Take 50,000 Units by mouth every seven (7) days. levothyroxine (SYNTHROID) 50 mcg tablet   Yes Yes   Sig: Take 50 mcg by mouth Daily (before breakfast). lisinopril (PRINIVIL, ZESTRIL) 10 mg tablet   Yes Yes   Sig: Take 10 mg by mouth daily. metFORMIN (GLUCOPHAGE) 500 mg tablet   Yes Yes   Sig: Take 500 mg by mouth daily. mirtazapine (REMERON) 15 mg tablet   Yes Yes   Sig: Take 15 mg by mouth nightly. Indications: appetite   polyethylene glycol (MIRALAX) 17 gram packet   Yes Yes   Sig: Take 17 g by mouth daily.    risperiDONE (RISPERDAL) 0.25 mg tablet   Yes Yes   Sig: Take 0.25 mg by mouth two (2) times a day. Indications: mood swings   vitamin e (E GEMS) 1,000 unit capsule   Yes Yes   Sig: Take 1,000 Units by mouth daily.       Facility-Administered Medications: None          Thank you,  PAM Lawton

## 2017-09-06 NOTE — PROGRESS NOTES
Discharge is planned for tomorrow. Have communicated with [facility] [phone] and have faxed [facility] [phone].

## 2017-09-07 VITALS
OXYGEN SATURATION: 94 % | SYSTOLIC BLOOD PRESSURE: 150 MMHG | BODY MASS INDEX: 19.46 KG/M2 | RESPIRATION RATE: 18 BRPM | TEMPERATURE: 98.1 F | WEIGHT: 114 LBS | HEART RATE: 75 BPM | HEIGHT: 64 IN | DIASTOLIC BLOOD PRESSURE: 68 MMHG

## 2017-09-07 LAB
ANION GAP SERPL CALC-SCNC: 9 MMOL/L (ref 5–15)
BUN SERPL-MCNC: 16 MG/DL (ref 6–20)
BUN/CREAT SERPL: 23 (ref 12–20)
CALCIUM SERPL-MCNC: 7.8 MG/DL (ref 8.5–10.1)
CHLORIDE SERPL-SCNC: 105 MMOL/L (ref 97–108)
CO2 SERPL-SCNC: 26 MMOL/L (ref 21–32)
CREAT SERPL-MCNC: 0.7 MG/DL (ref 0.55–1.02)
ERYTHROCYTE [DISTWIDTH] IN BLOOD BY AUTOMATED COUNT: 15.5 % (ref 11.5–14.5)
GLUCOSE BLD STRIP.AUTO-MCNC: 143 MG/DL (ref 65–100)
GLUCOSE BLD STRIP.AUTO-MCNC: 156 MG/DL (ref 65–100)
GLUCOSE SERPL-MCNC: 134 MG/DL (ref 65–100)
HCT VFR BLD AUTO: 31.6 % (ref 35–47)
HGB BLD-MCNC: 10.4 G/DL (ref 11.5–16)
MCH RBC QN AUTO: 27.2 PG (ref 26–34)
MCHC RBC AUTO-ENTMCNC: 32.9 G/DL (ref 30–36.5)
MCV RBC AUTO: 82.5 FL (ref 80–99)
PLATELET # BLD AUTO: 373 K/UL (ref 150–400)
POTASSIUM SERPL-SCNC: 3.4 MMOL/L (ref 3.5–5.1)
RBC # BLD AUTO: 3.83 M/UL (ref 3.8–5.2)
SERVICE CMNT-IMP: ABNORMAL
SERVICE CMNT-IMP: ABNORMAL
SODIUM SERPL-SCNC: 140 MMOL/L (ref 136–145)
WBC # BLD AUTO: 10.2 K/UL (ref 3.6–11)

## 2017-09-07 PROCEDURE — 74011250637 HC RX REV CODE- 250/637: Performed by: STUDENT IN AN ORGANIZED HEALTH CARE EDUCATION/TRAINING PROGRAM

## 2017-09-07 PROCEDURE — 82962 GLUCOSE BLOOD TEST: CPT

## 2017-09-07 PROCEDURE — 85027 COMPLETE CBC AUTOMATED: CPT | Performed by: INTERNAL MEDICINE

## 2017-09-07 PROCEDURE — 74011000250 HC RX REV CODE- 250: Performed by: INTERNAL MEDICINE

## 2017-09-07 PROCEDURE — 74011250637 HC RX REV CODE- 250/637: Performed by: INTERNAL MEDICINE

## 2017-09-07 PROCEDURE — 80048 BASIC METABOLIC PNL TOTAL CA: CPT | Performed by: INTERNAL MEDICINE

## 2017-09-07 RX ORDER — POLYETHYLENE GLYCOL 3350 17 G/17G
17 POWDER, FOR SOLUTION ORAL 2 TIMES DAILY
Qty: 30 EACH | Refills: 0 | Status: SHIPPED | OUTPATIENT
Start: 2017-09-07

## 2017-09-07 RX ADMIN — METRONIDAZOLE 500 MG: 500 INJECTION, SOLUTION INTRAVENOUS at 06:23

## 2017-09-07 RX ADMIN — TRAMADOL HYDROCHLORIDE 50 MG: 50 TABLET, FILM COATED ORAL at 00:19

## 2017-09-07 RX ADMIN — ASPIRIN 81 MG 81 MG: 81 TABLET ORAL at 09:55

## 2017-09-07 RX ADMIN — POLYETHYLENE GLYCOL 3350 17 G: 17 POWDER, FOR SOLUTION ORAL at 09:53

## 2017-09-07 RX ADMIN — CASTOR OIL AND BALSAM, PERU: 788; 87 OINTMENT TOPICAL at 11:26

## 2017-09-07 RX ADMIN — RISPERIDONE 0.25 MG: 0.25 TABLET ORAL at 09:54

## 2017-09-07 NOTE — PROGRESS NOTES
[address] SHIFT NURSING NOTE    Bedside and Verbal shift change report given to  (oncoming nurse) by  Oli Aplington nurse). Report included the following information Kardex. SHIFT SUMMARY: Pt remains confused and unpleasant ,uncooperative with staff ,sitter at bedside for safety. Difficulty to preform care,vitals,assessment ,as pt refuses everything until much encouragement   Naps and awakens with attempts to get OOB ,requires redirection and reorientation. Refused am labs ,and attempts to hit nurses hand away ,and refused am blood sugar . I will continue to try to obtain both samples .          Admission Date 9/3/2017   Admission Diagnosis Syncope and collapse   Consults IP CONSULT TO ORTHOPEDIC SURGERY        Consults   [x] PT   [x] OT   [] Speech   [] Palliative      [] Hospice    [] Case Management   [] None   Cardiac Monitoring   [x] Yes   [] No     Antibiotics   [x] Yes   [] No   GI Prophylaxis  (Ex: Protonix, Pepcid, etc,.)   [] Yes   [] No          DVT Prophylaxis   SCDs:             Michael stockings:         [x] Medication (Ex: Lovenox, Eliquis, Brilinta, Coumadin,  Heparin, etc..)   [] Contraindicated   [] No VTE needed       Urinary Catheter       External Female Catheter 09/04/17-Urine Output (mL): 600 ml     LDAs               Peripheral IV 09/04/17 Right;Upper Arm (Active)   Site Assessment Clean, dry, & intact 9/6/2017  4:15 PM   Phlebitis Assessment 0 9/6/2017  4:15 PM   Infiltration Assessment 0 9/6/2017  4:15 PM   Dressing Status Clean, dry, & intact 9/6/2017  4:15 PM   Dressing Type Tape;Transparent 9/6/2017  4:15 PM   Hub Color/Line Status Blue 9/6/2017  4:15 PM          External Female Catheter 09/04/17 (Active)   Site Assessment Clean, dry, & intact 9/6/2017  5:04 PM   Repositioned Yes 9/6/2017  5:04 PM   Perineal Care Yes 9/6/2017  5:04 PM   Wick Changed Yes 9/6/2017  5:04 PM   Suction Canister/Tubing Changed No 9/6/2017  5:04 PM   Urine Output (mL) 600 ml 9/6/2017  5:04 PM                I/Os Intake/Output Summary (Last 24 hours) at 09/06/17 2157  Last data filed at 09/06/17 1704   Gross per 24 hour   Intake              220 ml   Output             3350 ml   Net            -3130 ml         Activity Level Activity Level: Bed Rest     Activity Assistance: Complete care   Diet Active Orders   Diet    DIET REGULAR      Purposeful Rounding every 1-2 hour? [x] Yes    Rose Score  Total Score: 4   Bed Alarm (If score 3 or >)   [] Yes    [] Refused (See signed refusal form in chart)   Bernard Score  Bernard Score: 13       Bernard Score (if score 14 or less)   [] PMT consult   [] Nutrition consult   [] Wound Care consult      []  Specialty bed         Influenza Vaccine Received Flu Vaccine for Current Season (usually Sept-March): Not Flu Season               Needs prior to discharge:   Home O2 required:    [] Yes   [x] No     If yes, how much O2 required? Other:    Last Bowel Movement Date: 09/04/17   Readmission Risk Assessment Tool Score High Risk            22       Total Score        3 Has Seen PCP in Last 6 Months (Yes=3, No=0)    2 . Living with Significant Other. Assisted Living. LTAC. SNF. or   Rehab    5 Pt.  Coverage (Medicare=5 , Medicaid, or Self-Pay=4)    12 Charlson Comorbidity Score (Age + Comorbid Conditions)        Criteria that do not apply:    Patient Length of Stay (>5 days = 3)    IP Visits Last 12 Months (1-3=4, 4=9, >4=11)       Expected Length of Stay 2d 9h   Actual Length of Stay 3

## 2017-09-07 NOTE — PROGRESS NOTES
Patient may return to [facility] this AM. [facility] ambulance is scheduled for 11:30 AM today. Have faxed medicals to the facility and notified them of transport time. Nursing will need to call report to [phone]. Daughter is at bedside and in agreement to the above arrangements. [facility] request of [facility]. If patient is going to make any progress with therapy it may be accomplished in the home setting where patient is most familiar. The [facility] advised they do arrange any future medical follow-up and management on campus. There is a house doctor that is on campus to assess patient's every Friday.      Care Management Interventions  Transition of Care Consult (CM Consult): [address]: No  Reason Outside [place]:  ([facility]  [address] [facility] [address]: No  Discharge Durable Medical Equipment: No  Physical Therapy Consult: Yes  Occupational Therapy Consult: Yes  Speech Therapy Consult: No  Current Support Network: Assisted Living  Plan discussed with Pt/Family/Caregiver: Yes  Freedom of Choice Offered: Yes  Discharge Location  Discharge Placement: Home with home health

## 2017-09-07 NOTE — PROGRESS NOTES
PT note:    Chart reviewed and spoke with nursing and case management. Patient is from a dementia unit at [facility] and planning to discharge today at 11:30. PT services are not indicated due to patient's baseline confusion and PLOF. Will sign off.      Kamala Amado, PT, DPT

## 2017-09-07 NOTE — PROGRESS NOTES
Hospital to [facility] SBAR Handoff - Raven Villasenor                                                                        80 y.o.   female    [address]   Room: [facility] CARDIOPULMONARY CARE  Unit Phone# :        [address]  [facility] [address]   [address] [address]  Dept: [phone]  Loc: [phone]                    SITUATION     Admitted:  9/3/2017         Attending Provider:  Marc Herrmann MD       Consultations:  IP CONSULT TO ORTHOPEDIC SURGERY    PCP:  PROVIDER UNKNOWN   None    Treatment Team: Attending Provider: Marc Herrmann MD; Utilization Review: Mino Joseph; Care Manager: SHANNAN Hall    Admitting Dx:  Syncope and collapse       Principal Problem: Convulsive syncope    * No surgery found * of      BY: * Surgery not found *             ON: * No surgery found *                  Code Status: DNR                Advance Directives:   Advance Care Planning 9/3/2017   Patient's Healthcare Decision Maker is: Legal Next of [address]   Primary Decision Maker Name Mishel Azul   Primary Decision Maker Phone Number [phone]   Confirm Advance Directive Yes, not on file    (Send w/patient)   Yes Not W Pt       Isolation:  There are currently no Active Isolations       MDRO: No current active infections    Pain Medications given:  Tramadol    Last dose: 9/7/2017 at  [address] [place] [address] needed: no  Type of equipment:      (Not currently on dialysis)  (Not currently on dialysis)  (Not currently on dialysis)     BACKGROUND     Allergies:   Allergies   Allergen Reactions    Demerol [Meperidine] Unknown (comments)    Morphine Unknown (comments)    Penicillins Unknown (comments)    Demerol [Meperidine] Other (comments)     Unsure of reaction    Morphine Other (comments)     Unsure of reaction    Pcn [Penicillins] Other (comments)     unknown       Past Medical History:   Diagnosis Date    [facility]     Diabetes mellitus ([facility] [facility]     [facility]     Hypertension     Hypothyroid     Hypothyroidism        Past Surgical History:   Procedure Laterality Date    HX APPENDECTOMY      HX CHOLECYSTECTOMY      HX HYSTERECTOMY         Prescriptions Prior to Admission   Medication Sig    acetaminophen (TYLENOL) 325 mg tablet Take 325 mg by mouth every six (6) hours as needed for Pain or Fever.  Mineral Oil drop 2 Drops by Otic route every seven (7) days.  risperiDONE (RISPERDAL) 0.25 mg tablet Take 0.25 mg by mouth two (2) times a day. Indications: mood swings    CALCIUM CARBONATE (OYSTER SHELL CALCIUM 500 PO) Take 500 mg by mouth daily.  ergocalciferol (VITAMIN D2) 50,000 unit capsule Take 50,000 Units by mouth every seven (7) days.  vitamin e (E GEMS) 1,000 unit capsule Take 1,000 Units by mouth daily.  mirtazapine (REMERON) 15 mg tablet Take 15 mg by mouth nightly. Indications: appetite    metFORMIN (GLUCOPHAGE) 500 mg tablet Take 500 mg by mouth daily.  amLODIPine (NORVASC) 10 mg tablet Take 10 mg by mouth daily.  docusate sodium (COLACE) 100 mg capsule Take 100 mg by mouth daily.  lisinopril (PRINIVIL, ZESTRIL) 10 mg tablet Take 10 mg by mouth daily.  aspirin 81 mg chewable tablet Take 1 Tab by mouth daily.  levothyroxine (SYNTHROID) 50 mcg tablet Take 50 mcg by mouth Daily (before breakfast).  cyanocobalamin (VITAMIN B-12) 1,000 mcg tablet Take 1,000 mcg by mouth daily. Hard scripts included in transfer packet yes    Vaccinations:    Immunization History   Administered Date(s) Administered    Influenza Vaccine Split 09/16/2012    TDAP Vaccine 09/14/2012       Readmission Risks:    Known Risks: The Charlson CoMorbitiy Index tool is an evidenced based tool that has more automatic generated information.  The tool looks at many different items such as the age of the patient, how many times they were admitted in the last calendar year, current length of stay in the hospital and their diagnosis. All of these items are pulled automatically from information documented in the chart from various places and will generate a score that predicts whether a patient is at low (less than 13), medium (13-20) or high (21 or greater) risk of being readmitted.         ASSESSMENT                Temp: 98.1 °F (36.7 °C) (09/07/17 0743) Pulse (Heart Rate): 75 (09/07/17 0743)     Resp Rate: 18 (09/07/17 0743)           BP: 150/68 (09/07/17 0743)     O2 Sat (%): 94 % (09/07/17 0743)     Weight: 51.7 kg (114 lb)    Height: 5' 4\" (162.6 cm) (09/03/17 1547)       If above not within 1 hour of discharge:    BP:_____  P:____  R:____ T:_____ O2 Sat: ___%  O2: ______    Active Orders   Diet    DIET REGULAR         Orientation: only aware of  place and person     Active Behaviors: Confusion                                   Active Lines/Drains:  (Peg Tube / Claudio / CL or S/L?): no    Urinary Status: Voiding, External catheter, Draining     Last BM: Last Bowel Movement Date: 09/05/17     Skin Integrity: Intact, Wound (add Wound LDA)   Wound Sacral/coccyx-DRESSING STATUS: Changed per order  Wound Buttocks Right-DRESSING STATUS: Changed per order    Wound Sacral/coccyx-DRESSING TYPE: Open to air  Wound Buttocks Right-DRESSING TYPE: Open to air    Mobility: Very limited   Weight Bearing Status: WBAT (Weight Bearing as Tolerated)                Lab Results   Component Value Date/Time    Glucose 134 09/07/2017 07:38 AM    Hemoglobin A1c 6.8 01/26/2016 06:36 AM    INR 2.5 01/27/2016 04:06 AM    INR 1.7 01/26/2016 06:07 AM    HGB 10.4 09/07/2017 07:38 AM    HGB 10.0 09/06/2017 04:25 AM        RECOMMENDATION     See After Visit Summary (AVS) for:  · Discharge instructions  · After [address]   · Special equipment needed (entered pre-discharge by Care Management)  · Medication Reconciliation    · Follow up Appointment(s)         Report given/sent by:  Alfreda Hay                    Verbal report given to: [place]  FAXED to:           Estimated discharge time:  9/7/2017 at 1150          1150:   Pt D/C left via medical transport. Is returning back to [address] [place] [address]. All D/C medications and instructions sent via medical transport and report was call to [place] at [facility]. Daughter left with all the patient's personal belongings. IV was removed.

## 2017-09-07 NOTE — DISCHARGE SUMMARY
Hospitalist Discharge Summary     Patient ID:  Harish Duvall  [id number]  80 y.o.  9/10/1922    PCP on record: PROVIDER UNKNOWN    Admit date: 9/3/2017  Discharge date: 9/7/2017      DISCHARGE DIAGNOSES  DISCHARGE SUMMARY/HOSPITAL COURSE: for full details see H&P, daily progress notes, labs, consult notes. Convulsive syncope  Probably vasovagal syncope with myoclonic jerks  -patient with marked stool retention and had a vasovagal episode while straining to have a BM  -avoid narcotics and increase miralax        Constipation  Overflow fecal incontinence  -has had BMs here  -will continue miralax  -CT abdomen no definite evidence for obstruction. Fecal stasis in the rectosigmoid.       Right medial and lateral malleoli fracture  -appreciate ortho evaluation, cast applied  -recommend PT/OT -- non weight bearing on RLE  -home health PT     Leukocytosis - resolved    FADY, Dehydration, Lactic acidosis, downtrending 2.9-> 2.3  HTN  -resume lisinopril and amlodipine    DM      Hx CVA (suspected embolic in 2704)  -continue ASA      Hypothyroidism  -continue synthroid  -TSH 1.7      Dementia worsened since CVA 5 yrs ago  -continue risperidal  -restart remeron      Hx rib fracture and left olecranon fracture     Stage 2 sacral ulcer, poa      Code Status:   BUZ  Surrogate Decision Maker:  Daughter     Baseline:   Lives in a dementia unit      CONSULTATIONS:  IP CONSULT TO ORTHOPEDIC SURGERY    Excerpted HPI from H&P of Josefina Multani MD:  Harish Duvall is a 80 y.o.  female with a history of dementia, HTN, DM and prior CVA who presents after a syncopal episode. Patient lives in a dementia unit ([address] [place] [address]) and was on the toilet trying to have a bowel movement when she passed out. Nursing reported patient having seizure like activity and she was out for just a few seconds. She was noted to be hypotensive and having multiple bouts of diarrhea.   EMS was dispatched and ER evaluation is remarkable for FADY and rectosigmoid fecal impaction or stasis seen on CT scan. We were asked to admit for work up and evaluation of the above problems. _______________________________________________________________________  Patient seen and examined by me on discharge day. Pertinent Findings:  Gen:    Not in distress  Neuro:  Alert, calm  _______________________________________________________________________  DISCHARGE MEDICATIONS:   Discharge Medication List as of 9/7/2017 10:45 AM      CONTINUE these medications which have CHANGED    Details   polyethylene glycol (MIRALAX) 17 gram packet Take 1 Packet by mouth two (2) times a day., Print, Disp-30 Each, R-0         CONTINUE these medications which have NOT CHANGED    Details   acetaminophen (TYLENOL) 325 mg tablet Take 325 mg by mouth every six (6) hours as needed for Pain or Fever., Historical Med      Mineral Oil drop 2 Drops by Otic route every seven (7) days. , Historical Med      risperiDONE (RISPERDAL) 0.25 mg tablet Take 0.25 mg by mouth two (2) times a day. Indications: mood swings, Historical Med      CALCIUM CARBONATE (OYSTER SHELL CALCIUM 500 PO) Take 500 mg by mouth daily. , Historical Med      ergocalciferol (VITAMIN D2) 50,000 unit capsule Take 50,000 Units by mouth every seven (7) days. , Historical Med      vitamin e (E GEMS) 1,000 unit capsule Take 1,000 Units by mouth daily. , Historical Med      mirtazapine (REMERON) 15 mg tablet Take 15 mg by mouth nightly. Indications: appetite, Historical Med      metFORMIN (GLUCOPHAGE) 500 mg tablet Take 500 mg by mouth daily. , Historical Med      amLODIPine (NORVASC) 10 mg tablet Take 10 mg by mouth daily. , Historical Med      docusate sodium (COLACE) 100 mg capsule Take 100 mg by mouth daily. , Historical Med      lisinopril (PRINIVIL, ZESTRIL) 10 mg tablet Take 10 mg by mouth daily. , Historical Med      aspirin 81 mg chewable tablet Take 1 Tab by mouth daily. OTC, 81 mg      levothyroxine (SYNTHROID) 50 mcg tablet Take 50 mcg by mouth Daily (before breakfast). Historical Med, 50 mcg      cyanocobalamin (VITAMIN B-12) 1,000 mcg tablet Take 1,000 mcg by mouth daily. , Historical Med             My Recommended Diet, Activity, Wound Care, and follow-up labs are listed in the patient's Discharge Insturctions which I have personally completed and reviewed.     _______________________________________________________________________  DISPOSITION:    Home with Family:    Home with HH/PT/OT/RN: x   SNF/LTC:    MERCEDES:    OTHER:        Condition at Discharge:  Stable  _______________________________________________________________________  Follow up with:   PCP : PROVIDER UNKNOWN  Follow-up Information     Follow up With Details Comments [address]   [phone]              Total time in minutes spent coordinating this discharge (includes going over instructions, follow-up, prescriptions, and preparing report for sign off to her PCP) :  35 minutes    Signed:  Bravo Ingram MD

## 2017-09-07 NOTE — FACE TO FACE
FACE  [facility]    Pt Name  Raven Valenzuela   Date of Birth 9/10/1922   Age  80 y.o. Medical Record Number  [id number]   Room Number  2284/01   Admit date:  9/3/2017   Date of Face to Face:  9/7/2017     Admission Diagnosis:  Convulsive syncope     Diagnoses Present on Admission:  **None**     Past Medical History:   Diagnosis Date    [facility]     Diabetes mellitus ([facility]     Diabetic acidosis, type II ([facility]     Hypertension     Hypothyroid     Hypothyroidism       Visit Vitals    /68 (BP 1 Location: Right arm, BP Patient Position: At rest)    Pulse 75    Temp 98.1 °F (36.7 °C)    Resp 18    Ht 5' 4\" (1.626 m)    Wt 51.7 kg (114 lb)    SpO2 94%    BMI 19.57 kg/m2           Allergies   Allergen Reactions    Demerol [Meperidine] Unknown (comments)    Morphine Unknown (comments)    Penicillins Unknown (comments)    Demerol [Meperidine] Other (comments)     Unsure of reaction    Morphine Other (comments)     Unsure of reaction    Pcn [Penicillins] Other (comments)     unknown             I certify that the patient needs home health care as prescribed below and I will not be following this patient in the Community.  I also certify that the patient is homebound as evidenced by    - Patient currently under activity restrictions secondary to recent surgical procedure, this hinders their ability to safely leave the home. - Patient with strength deficits limiting the performance of all ADL's without caregiver assistance or the use of an assistive device. - Patient with poor safety awareness and is at risk for falls without assistance of another person and the use of an assistive device. Patient with poor ambulation endurance limiting their safe ability to ascend/descend the required number of steps to leave the home. - Patient with visual deficits increasing risk for falls with all ambulation outside of the home.   Patient requires the assistance of others when leaving the home.   - Requires considerable and taxing effort to leave the home , Requires the assistance of 1 or more persons to leave the home  and Decreased mental status requiring 24 hour supervision    - and also as noted in various sections of this medical record.  Patient's primary physician will be responsible for signing the Plan of Care and will follow/coordinate ongoing care.  Initial Orders for Care:  - physical therapy: transfer training and balance training  - occupational therapy:  ROM    - Report to Patient's primary physician     I certify that I am taking care of the patient today (Please see hospital Discharge Records for details of clinical issues pertaining to this order).         Electronically Signed:  Frank Espinoza MD    Hospitalist, [address], [address]  Tel: [phone]

## 2017-09-09 LAB
BACTERIA SPEC CULT: NORMAL
SERVICE CMNT-IMP: NORMAL

## 2017-10-27 ENCOUNTER — APPOINTMENT (OUTPATIENT)
Dept: GENERAL RADIOLOGY | Age: 82
End: 2017-10-27
Attending: EMERGENCY MEDICINE
Payer: MEDICARE

## 2017-10-27 ENCOUNTER — HOSPITAL ENCOUNTER (EMERGENCY)
Age: 82
Discharge: HOME | End: 2017-10-27
Attending: EMERGENCY MEDICINE
Payer: MEDICARE

## 2017-10-27 ENCOUNTER — APPOINTMENT (OUTPATIENT)
Dept: CT IMAGING | Age: 82
End: 2017-10-27
Attending: EMERGENCY MEDICINE
Payer: MEDICARE

## 2017-10-27 VITALS
TEMPERATURE: 97.9 F | BODY MASS INDEX: 19.57 KG/M2 | HEIGHT: 64 IN | DIASTOLIC BLOOD PRESSURE: 43 MMHG | RESPIRATION RATE: 16 BRPM | SYSTOLIC BLOOD PRESSURE: 124 MMHG | OXYGEN SATURATION: 93 % | HEART RATE: 82 BPM | WEIGHT: 114.64 LBS

## 2017-10-27 DIAGNOSIS — S52.021A CLOSED FRACTURE OF RIGHT OLECRANON PROCESS, INITIAL ENCOUNTER: ICD-10-CM

## 2017-10-27 DIAGNOSIS — S72.114A CLOSED NONDISPLACED FRACTURE OF GREATER TROCHANTER OF RIGHT FEMUR, INITIAL ENCOUNTER (HCC): Primary | ICD-10-CM

## 2017-10-27 PROCEDURE — 72170 X-RAY EXAM OF PELVIS: CPT

## 2017-10-27 PROCEDURE — 72192 CT PELVIS W/O DYE: CPT

## 2017-10-27 PROCEDURE — 73070 X-RAY EXAM OF ELBOW: CPT

## 2017-10-27 PROCEDURE — 74011250637 HC RX REV CODE- 250/637: Performed by: EMERGENCY MEDICINE

## 2017-10-27 PROCEDURE — 99285 EMERGENCY DEPT VISIT HI MDM: CPT

## 2017-10-27 PROCEDURE — 73552 X-RAY EXAM OF FEMUR 2/>: CPT

## 2017-10-27 RX ORDER — TRAMADOL HYDROCHLORIDE 50 MG/1
50 TABLET ORAL
Qty: 20 TAB | Refills: 0 | Status: SHIPPED | OUTPATIENT
Start: 2017-10-27

## 2017-10-27 RX ORDER — TRAMADOL HYDROCHLORIDE 50 MG/1
50 TABLET ORAL
Status: COMPLETED | OUTPATIENT
Start: 2017-10-27 | End: 2017-10-27

## 2017-10-27 RX ADMIN — TRAMADOL HYDROCHLORIDE 50 MG: 50 TABLET, FILM COATED ORAL at 08:31

## 2017-10-27 NOTE — ED TRIAGE NOTES
Pt. Complains of being cold. Warm blanket given. PT. Sleeping with times of apnea and snoring. Daughter at bedside.   Annaleo Unique [phone] (c)

## 2017-10-27 NOTE — CONSULTS
CC: 10/27/17    HPI: 80 y.o. female with history of Severe dementia presented after a fall. She has a history of a right ankle fractures treated nonsurgically in a Cam boot by Dr. Roberson. She also sustained a right wrist fracture treated non operatively by Dr. Daria Rangel. She comes in today with new chief complaint of right hip pain. Exam and history are extremely limited due to her severe dementia. She does not complain of pain other  Than the hip. ROS:  Positive per HPI, otherwise negative. PMHX:  Past Medical History:   Diagnosis Date    Diabetes ([facility]     Diabetes mellitus ([facility]     Diabetic acidosis, type II ([facility]     Hypertension     Hypothyroid     Hypothyroidism        PSHX  Past Surgical History:   Procedure Laterality Date    HX APPENDECTOMY      HX CHOLECYSTECTOMY      HX HYSTERECTOMY         ALLERGIES:  Allergies   Allergen Reactions    Demerol [Meperidine] Unknown (comments)    Morphine Unknown (comments)    Penicillins Unknown (comments)    Demerol [Meperidine] Other (comments)     Unsure of reaction    Morphine Other (comments)     Unsure of reaction    Pcn [Penicillins] Other (comments)     unknown       MEDS  No current facility-administered medications on file prior to encounter. Current Outpatient Prescriptions on File Prior to Encounter   Medication Sig Dispense Refill    polyethylene glycol (MIRALAX) 17 gram packet Take 1 Packet by mouth two (2) times a day. 30 Each 0    acetaminophen (TYLENOL) 325 mg tablet Take 325 mg by mouth every six (6) hours as needed for Pain or Fever.  Mineral Oil drop 2 Drops by Otic route every seven (7) days.  risperiDONE (RISPERDAL) 0.25 mg tablet Take 0.25 mg by mouth two (2) times a day. Indications: mood swings      CALCIUM CARBONATE (OYSTER SHELL CALCIUM 500 PO) Take 500 mg by mouth daily.  ergocalciferol (VITAMIN D2) 50,000 unit capsule Take 50,000 Units by mouth every seven (7) days.       vitamin e (E GEMS) 1,000 unit capsule Take 1,000 Units by mouth daily.  mirtazapine (REMERON) 15 mg tablet Take 15 mg by mouth nightly. Indications: appetite      amLODIPine (NORVASC) 10 mg tablet Take 10 mg by mouth daily.  docusate sodium (COLACE) 100 mg capsule Take 100 mg by mouth daily.  lisinopril (PRINIVIL, ZESTRIL) 10 mg tablet Take 10 mg by mouth daily.  aspirin 81 mg chewable tablet Take 1 Tab by mouth daily.  levothyroxine (SYNTHROID) 50 mcg tablet Take 50 mcg by mouth Daily (before breakfast).  cyanocobalamin (VITAMIN B-12) 1,000 mcg tablet Take 1,000 mcg by mouth daily. SOC HX  Social History     Social History    Marital status:      Spouse name: N/A    Number of children: N/A    Years of education: N/A     Occupational History    Not on file. Social History Main Topics    Smoking status: Never Smoker    Smokeless tobacco: Never Used    Alcohol use No    Drug use: No    Sexual activity: Not on file     Other Topics Concern    Not on file     Social History Narrative    ** Merged History Encounter **            PE:  Visit Vitals    /44    Pulse 73    Temp 97.9 °F (36.6 °C)    Resp 19    Ht 5' 4\" (1.626 m)    Wt 52 kg (114 lb 10.2 oz)    SpO2 90%    BMI 19.68 kg/m2       A&Ox3  Normocephalic, atraumatic  Trachea midline  No audible wheezes  NRRR  Abdomen soft/NT    LLE:  Tenderness to palpation is present at the greater trochanter. No pain with logroll of the leg. Skin intact, compartments soft  - Motor: grossly intact  - Sensory exam limited 2/2 dementia  - Vascular: + DP, CR < 2 sec      No gross deformity of the right lower extremity. Left upper extremity has full active range of motion intact. She does not show any signs of pain when her left elbow is palpated. Ecchymosis. No skin lesions.     RUE:  Short arm, purple cast in place and in good condition      IMAGING:     Three views of the right hip and pelvis demonstrate comminuted minimally displaced right greater trochanter fracture    CT of the pelvis demonstrates  Comminuted greater trochanter fracture with minimal displacement. There is no extension into the subtrochanteric region or femoral neck. She has a subacute appearing  L4 superior endplate fracture. No other acute fractures are noted. L elbow - no definite acute fracture, subtle cortical irregularity does not appear acute. No effusion at elbow present. A/P:   Right greater trochanter fracture without extension into the femoral neck or subtrochanteric region. I am recommending weight-bearing as tolerated, limited only to right ankle fracture per Dr. Yaima Yeung instructions. No surgical intervention indicated for right hip. I do not believe that the left elbow radiographic finding is consistent with acute fracture. She can use her left upper extremity as needed. She will follow up with Dr. Yaima Yeung and Dr. Latanya Meza as previously scheduled.

## 2017-10-27 NOTE — ED NOTES
Pt sleeping, periods of apnea, pt refusing to keep nasal cannula on. When pt is awoken SpO2 goes up to 95% on room air.  Will continue to monitor

## 2017-10-27 NOTE — ED NOTES
Bedside shift change report given to this nurse (oncoming nurse) by Norm Gallardo RN (offgoing nurse). Report included the following information SBAR and ED Summary. Pt sleeping, daughter at bedside, call bell within reach.

## 2017-10-27 NOTE — ED PROVIDER NOTES
[address]  EMERGENCY DEPARTMENT HISTORY AND PHYSICAL EXAM       Date of Service: 10/27/2017   Patient Name: Valeria Cisneros   YOB: 1922  Medical Record Number: [id number]    History of Presenting Illness     Chief Complaint   Patient presents with    Fall     Pt. brought by EMS after a witnessed fall yesterday without loc. Pt. has a fx of right wrist and right ankle and went to stand up and fell. History Provided By:  EMS    Additional History:   Vaelria Cisneros is a 80 y.o. female with PMhx significant for DM, HTN, and hypothyroid who presents via EMS to the ED with cc of witnessed glf 10/26. EMS states pt currently with fractured right wrist and right ankle. Pt is a resident at [facility] and is not supposed to ambulate herself. Per EMS, pt went to stand without assistance and fell. EMS states pt did not lose consciousness. Social Hx: - Tobacco, - EtOH, - Illicit Drugs    There are no other complaints, changes or physical findings at this time. Primary Care Provider: PROVIDER UNKNOWN     Past History     Past Medical History:   Past Medical History:   Diagnosis Date    Diabetes ([facility] [address]     Diabetes mellitus ([facility] [address]     Diabetic acidosis, type II ([facility] [address]     Hypertension     Hypothyroid     Hypothyroidism         Past Surgical History:   Past Surgical History:   Procedure Laterality Date    HX APPENDECTOMY      HX CHOLECYSTECTOMY      HX HYSTERECTOMY          Family History:   Family History   Problem Relation Age of Onset    Diabetes Father     Cancer Mother      ovarian         Social History:   Social History   Substance Use Topics    Smoking status: Never Smoker    Smokeless tobacco: Never Used    Alcohol use No        Allergies:    Allergies   Allergen Reactions    Demerol [Meperidine] Unknown (comments)    Morphine Unknown (comments)    Penicillins Unknown (comments)    Demerol [Meperidine] Other (comments)     Unsure of reaction    Morphine Other (comments)     Unsure of reaction    Pcn [Penicillins] Other (comments)     unknown         Review of Systems   Review of Systems   Unable to perform ROS: Age   All other systems reviewed and are negative. Physical Exam  Physical Exam   Constitutional: She appears well-developed and well-nourished. No distress. HENT:   Head: Normocephalic and atraumatic. Mouth/Throat: Oropharynx is clear and moist. No oropharyngeal exudate. Eyes: Conjunctivae and EOM are normal. Pupils are equal, round, and reactive to light. Neck: Normal range of motion. Neck supple. No JVD present. No tracheal deviation present. Cardiovascular: Normal rate, regular rhythm, normal heart sounds and intact distal pulses. No murmur heard. Pulmonary/Chest: Effort normal and breath sounds normal. No stridor. No respiratory distress. She has no wheezes. She has no rales. She exhibits no tenderness. Abdominal: Soft. She exhibits distension. There is no tenderness. There is no rebound and no guarding. Musculoskeletal: She exhibits tenderness. She exhibits no edema. Right elbow with ttp no obvious deformity, cast to right wrist, RLE in walking boot, there is tenderness in right hip with palpation, to ttp distal, mild ttp over the right ankle, distal pulses intact, no spinal step-offs     Neurological: She is alert. No cranial nerve deficit. No gross motor or sensory deficits, + confusion   Skin: Skin is warm and dry. She is not diaphoretic. Psychiatric: She has a normal mood and affect. Her behavior is normal.   Nursing note and vitals reviewed. Medical Decision Making   I am the first provider for this patient. I reviewed the vital signs, available nursing notes, past medical history, past surgical history, family history and social history. Old Medical Records: Old medical records. Nursing notes. Ambulance run sheet.      Provider Notes:     DDx: elbow fracture, elbow contusion, pelvic fracture, distal femur fracture      ED Course:  8:30 AM  Initial assessment performed. The patients presenting problems have been discussed, and they are in agreement with the care plan formulated and outlined with them. I have encouraged them to ask questions as they arise throughout their visit. Diagnostic Study Results     Radiologic Studies -  The following have been ordered and reviewed:  CT PELV WO CONT   Final Result   INDICATION:  right greater troch fx, evaluate for further acute injury     EXAM:  CT PELVIS WITHOUT CONTRAST     COMPARISON:  September 3, 2017     TECHNIQUE: 2.5 mm axial images were obtained through the pelvis. Coronal and  sagittal reconstructions were generated. CT dose reduction was achieved through  use of a standardized protocol tailored for this examination and automatic  exposure control for dose modulation.     FINDINGS: There is acute, comminuted and slightly avulsed fracture from the  greater trochanter of the right femur. No involvement of the lesser trochanter  or femoral neck. No evidence of acute dislocation. Healed fractures of the left  superior and inferior pubic rami. Sacroiliac joints are intact. Partially  visualized superior endplate fracture of L4 new since prior examination.      IMPRESSION  IMPRESSION:      Acute, comminuted and slightly avulsed right greater trochanter fracture of the  proximal femur. Interval development of L4 superior endplate fracture partly  visualized though this may be subacute. .     XR FEMUR RT 2 VS   Final Result   INDICATION:   Trauma     COMPARISON: None     FINDINGS:     2 views of the right femur demonstrate acute fracture of the greater trochanter,  slightly avulsed in appearance. This does not appear to extend to the femoral  neck are lesser trochanter though imaging is somewhat degraded by positioning  and technique.  The mid and distal femur appear intact.     IMPRESSION  IMPRESSION:  Acute fracture greater trochanter of the femur as above. XR ELBOW RT AP/LAT   Final Result   INDICATION:   Trauma     EXAMINATION:  ELBOW RADIOGRAPHS     COMPARISON:  None     FINDINGS:  AP, lateral, views of the right elbow demonstrate irregularity of the distal  humerus just above the medial upper condyle. Alignment on the 2 provided views  is essentially normal with no dislocation, though somewhat difficult to evaluate  for effusion on lateral view.       IMPRESSION  IMPRESSION:  Cortical irregularity of the distal humerus just above the medial epicondyles  suspicious for fracture, though slight technical limitations as above. XR PELV AP ONLY   Final Result   INDICATION:   Trauma     COMPARISON: September 3, 2017     FINDINGS:     AP view of the pelvis demonstrates chronic left superior and inferior pubic rami  fractures. Probable chronic right pubic fracture near the pubic symphysis  similar to prior CT scan. There is irregularity of the greater trochanter of the  right femur. Bowel gas obscures portions of the sacrum and the sacroiliac joints  appear intact.     IMPRESSION  IMPRESSION:  Acute fracture greater trochanter of the right femur. No definite change in  chronic pubic rami fractures. CT Results  (Last 48 hours)               10/27/17 1143  CT PELV WO CONT Final result    Impression:  IMPRESSION:        Acute, comminuted and slightly avulsed right greater trochanter fracture of the   proximal femur. Interval development of L4 superior endplate fracture partly   visualized though this may be subacute. .             Narrative:  INDICATION:  right greater troch fx, evaluate for further acute injury       EXAM:  CT PELVIS WITHOUT CONTRAST       COMPARISON:  September 3, 2017       TECHNIQUE: 2.5 mm axial images were obtained through the pelvis. Coronal and   sagittal reconstructions were generated.   CT dose reduction was achieved through   use of a standardized protocol tailored for this examination and automatic   exposure control for dose modulation. FINDINGS: There is acute, comminuted and slightly avulsed fracture from the   greater trochanter of the right femur. No involvement of the lesser trochanter   or femoral neck. No evidence of acute dislocation. Healed fractures of the left   superior and inferior pubic rami. Sacroiliac joints are intact. Partially   visualized superior endplate fracture of L4 new since prior examination. Vital Signs-Reviewed the patient's vital signs. Patient Vitals for the past 12 hrs:   Temp Pulse Resp BP SpO2   10/27/17 1400 - 68 20 118/51 (!) 83 %   10/27/17 1345 - 67 22 126/48 (!) 81 %   10/27/17 1330 - 67 22 129/46 (!) 75 %   10/27/17 1315 - 65 22 115/44 (!) 80 %   10/27/17 1300 - 67 20 122/44 (!) 79 %   10/27/17 1245 - 75 17 114/43 93 %   10/27/17 1230 - 73 19 113/44 90 %   10/27/17 1215 - 74 19 122/50 (!) 64 %   10/27/17 1200 - 63 21 113/41 (!) 81 %   10/27/17 1145 - - - 106/45 -   10/27/17 1011 - 64 14 - 94 %   10/27/17 1002 - 66 18 - 90 %   10/27/17 0933 - (!) 59 17 - 95 %   10/27/17 0931 - (!) 59 16 - 97 %   10/27/17 0830 - 62 12 112/46 96 %   10/27/17 0822 - 62 16 - 97 %   10/27/17 0821 97.9 °F (36.6 °C) 60 17 115/50 97 %       Medications Given in the ED:  Medications   traMADol (ULTRAM) tablet 50 mg (50 mg Oral Given 10/27/17 0831)       Pulse Oximetry Analysis - Normal 97% on RA     Cardiac Monitor:   Rate: 60  Rhythm: Normal Sinus Rhythm     Discussed with Emi Gautam PA-C, Dr. Tatum Garcia, pt on non-weight bearing status from prior fall, pt with fx of right greater trochanter, non-operative management, feel right elbow subacute does no require any splinting at this time. Pt to be dc back to [facility], daughter present in ED and aware, f/u as outpt, Franco Piper DO    Diagnosis   Clinical Impression:   1.  Closed nondisplaced fracture of greater trochanter of right femur, initial encounter ([facility] [address]    2. Closed fracture of right olecranon process, initial encounter Plan:  1: Discharge home  Follow-up Information     Follow up With Details Comments Contact Info    Provider Unknown   Patient not available to ask          2:   Current Discharge Medication List      START taking these medications    Details   traMADol (ULTRAM) 50 mg tablet Take 1 Tab by mouth every six (6) hours as needed for Pain. Max Daily Amount: 200 mg. Qty: 20 Tab, Refills: 0           Return to ED if Worse    Disposition Note:    DISCHARGE NOTE:  2:04 PM  The patient is ready for discharge. The patients signs, symptoms, diagnosis, and instructions for discharge have been discussed and the pt has conveyed their understanding. The patient is to follow up as recommended with PCP or return to the ER should their symptoms worsen. Plan has been discussed and patient has conveyed their agreement.    _______________________________   Attestations: This note is prepared by Renu Bella, acting as Scribe for Georgette Alfaro, [address] [place] [address], DO: The scribe's documentation has been prepared under my direction and personally reviewed by me in its entirety.  I confirm that the note above accurately reflects all work, treatment, procedures, and medical decision making performed by me.      _______________________________

## 2017-10-27 NOTE — ED NOTES
Spoke with Vielka Noel at [facility] for pt transport back to nursing facility, ETA within the hour.

## 2017-10-27 NOTE — DISCHARGE INSTRUCTIONS
Greater trochanter fracture non-operative management    Subacute fracture of elbow no splint or cast needed          Surgery to Repair a Hip Fracture: What to Expect at the [facility]    After surgery to repair a hip fracture, you will spend a few hours in the recovery room, and then you will go to your hospital room. You may see a metal triangle called a trapeze over your bed. You can use this to help move yourself around in bed. You will be very tired and will want to rest. Your nurse may also help turn you as you rest.  You will probably still have a tube that drains urine from your bladder (urinary catheter), and you will probably be getting fluids through a tube in your vein called an IV. You may also have a drain near the cut (incision) on your hip. You may not feel hungry. You may feel sick to your stomach or constipated for a couple of days. This is common. Your nurse may give you stool softeners or laxatives to help with constipation. You may have stockings that put pressure on your legs to prevent blood clots. Your nurse may also give you medicines and exercise instructions to help prevent clots. Most people spend 2 to 4 days in the hospital. But depending on your health before the surgery, you may need to stay longer. This care sheet gives you a general idea about how your recovery will begin in the hospital. Each person has a different experience and recovers at a different pace. What will happen in the hospital?  Pain medicine  · For 1 to 3 days, you may have a needle hooked to your IV that gives you a safe dose of painkiller when you press a button. This will allow you to get pain relief when you need it without having to call a nurse. · When you no longer need the machine, your doctor will give you pain medicine that you take by mouth. Take it as needed, but remember that it is easier to prevent pain before it starts than to stop it once it has started.   · If you are still in pain after you take your medicine, tell your nurse. You may need new medicine or to get the medicine in a different form. · You may also have some mild pain in your back. Changing your position in bed may help. Other medicines  · Your doctor will probably give you blood thinners to prevent blood clots in your leg. You will take this medicine during your hospital stay and when you go home. Rehabilitation  · Your rehabilitation (rehab) will probably begin the day after your surgery. Your physical therapist will get you started. It may be painful to exercise at first, but your nurse will give you pain medicine if you need it. · Over the next few days, your physical therapist will help you walk, go up and down stairs, and get in and out of bed and chairs. He or she will help improve your movement (range of motion) and strength in your hip. · How quickly you regain strength and motion and do things on your own depends on how well you follow your physical therapy. Your physical therapist will teach you the exercises, but you must do them yourself. · An occupational therapist will work with you. He or she will teach you how to bathe, dress, and do daily activities. You may need tools to help with everyday activities. Tools include long-handled sponges, shower stools, and shoehorns. Diet  · You will get liquids at first, but you can begin to eat your normal diet when you feel you can. If your stomach is upset, your nurse will probably bring you bland, low-fat foods like plain rice, broiled chicken, toast, and yogurt. · You may have more fiber added to your meals to prevent constipation. Incision care  · You will have a bandage over your incision. Your nurse will care for this. Other instructions  · Your nurse or respiratory therapist will have you do breathing and coughing exercises to prevent problems such as pneumonia. Breathe in deeply through your nose, and slowly breathe out through your mouth.  Do this 3 times, and then cough 2 times. · You may have a device that you suck air through to help keep your lungs healthy (incentive spirometer). Use this as your nurse or respiratory therapist directs. When should you call for help? · You have trouble breathing. ? · You have a cough, shortness of breath, or chest pain. ? · You are sick to your stomach or cannot keep fluids down. ? · You have signs of a blood clot, such as:  ¨ Pain in your calf, back of the knee, thigh, or groin. ¨ Redness and swelling in your leg or groin. ? · You are in pain, or your pain does not get better after you take pain medicine. ? · You have loose stitches, or your incision comes open. ? · You have signs of infection, such as:  ¨ Increased pain, swelling, warmth, or redness. ¨ Red streaks leading from the incision. ¨ Pus draining from the incision. ¨ A fever. Where can you learn more? Go to http://mimi-lupe.info/. Enter V813 in the search box to learn more about \"Surgery to Repair a Hip Fracture: What to Expect at the Hospital.\"  Current as of: March 21, 2017  Content Version: 11.4  © 8549-7591 [employer], [employer]. Care instructions adapted under license by [employer] (which disclaims liability or warranty for this information). If you have questions about a medical condition or this instruction, always ask your healthcare professional. [place] [address] any warranty or liability for your use of this information.

## 2017-10-27 NOTE — ED NOTES
MD has reviewed discharge instructions with the patient. The patient verbalized understanding. Pt discharged home with daughter.

## 2017-10-27 NOTE — CONSULTS
ORTHOPAEDIC CONSULT NOTE    Subjective:     Date of Consultation:  October 27, 2017      Raven Barakat is a 80 y.o. female who is being seen for right hip fxr and right elbow pain. Witnessed GLF yesterday: 10/26. Currently being treated for right wrist fxr(Dr Norris) and right ankle fxr(Dr Roberson). Pt is a resident at [facility] and is not supposed to stand or ambulate independently. Patient with sig dementia and was not able to contribute to HPI or events leading up to today's ER visit. Wearing CAM boot on the RLE. Pt is non ambulatory, Wheelchair for mobility  Accompanied at bedside by daughter     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    Closed right ankle fracture 08/25/2017    Hypoxemia 01/25/2016    Closed olecranon fracture 01/25/2016    UTI (lower urinary tract infection) 01/25/2016    Dementia 01/25/2016    Fall 09/15/2012    Hyponatremia 09/15/2012    Diabetes mellitus ([address] 09/15/2012     Family History   Problem Relation Age of Onset    Diabetes Father     Cancer Mother      ovarian       Social History   Substance Use Topics    Smoking status: Never Smoker    Smokeless tobacco: Never Used    Alcohol use No     Past Medical History:   Diagnosis Date    Diabetes ([address]     Diabetes mellitus ([address]     Diabetic acidosis, type II ([address]     Hypertension     Hypothyroid     Hypothyroidism       Past Surgical History:   Procedure Laterality Date    HX APPENDECTOMY      HX CHOLECYSTECTOMY      HX HYSTERECTOMY        Prior to Admission medications    Medication Sig Start Date End Date Taking? Authorizing Provider   polyethylene glycol (MIRALAX) 17 gram packet Take 1 Packet by mouth two (2) times a day. 9/7/17  Yes Ramila Shah MD   acetaminophen (TYLENOL) 325 mg tablet Take 325 mg by mouth every six (6) hours as needed for Pain or Fever. Yes Historical Provider   Mineral Oil drop 2 Drops by Otic route every seven (7) days.    Yes Historical Provider   risperiDONE (RISPERDAL) 0.25 mg tablet Take 0.25 mg by mouth two (2) times a day. Indications: mood swings   Yes Historical Provider   CALCIUM CARBONATE (OYSTER SHELL CALCIUM 500 PO) Take 500 mg by mouth daily. Yes Historical Provider   ergocalciferol (VITAMIN D2) 50,000 unit capsule Take 50,000 Units by mouth every seven (7) days. Yes Historical Provider   vitamin e (E GEMS) 1,000 unit capsule Take 1,000 Units by mouth daily. Yes Historical Provider   mirtazapine (REMERON) 15 mg tablet Take 15 mg by mouth nightly. Indications: appetite   Yes Suyapa Hung MD   amLODIPine (NORVASC) 10 mg tablet Take 10 mg by mouth daily. Yes Historical Provider   docusate sodium (COLACE) 100 mg capsule Take 100 mg by mouth daily. Yes Historical Provider   lisinopril (PRINIVIL, ZESTRIL) 10 mg tablet Take 10 mg by mouth daily. Yes Historical Provider   aspirin 81 mg chewable tablet Take 1 Tab by mouth daily. 9/19/12  Yes Lena Levine MD   levothyroxine (SYNTHROID) 50 mcg tablet Take 50 mcg by mouth Daily (before breakfast). Yes Suyapa Hung MD   cyanocobalamin (VITAMIN B-12) 1,000 mcg tablet Take 1,000 mcg by mouth daily. Yes Suyapa Hung MD     No current facility-administered medications for this encounter. Current Outpatient Prescriptions   Medication Sig    polyethylene glycol (MIRALAX) 17 gram packet Take 1 Packet by mouth two (2) times a day.  acetaminophen (TYLENOL) 325 mg tablet Take 325 mg by mouth every six (6) hours as needed for Pain or Fever.  Mineral Oil drop 2 Drops by Otic route every seven (7) days.  risperiDONE (RISPERDAL) 0.25 mg tablet Take 0.25 mg by mouth two (2) times a day. Indications: mood swings    CALCIUM CARBONATE (OYSTER SHELL CALCIUM 500 PO) Take 500 mg by mouth daily.  ergocalciferol (VITAMIN D2) 50,000 unit capsule Take 50,000 Units by mouth every seven (7) days.  vitamin e (E GEMS) 1,000 unit capsule Take 1,000 Units by mouth daily.  mirtazapine (REMERON) 15 mg tablet Take 15 mg by mouth nightly. Indications: appetite    amLODIPine (NORVASC) 10 mg tablet Take 10 mg by mouth daily.  docusate sodium (COLACE) 100 mg capsule Take 100 mg by mouth daily.  lisinopril (PRINIVIL, ZESTRIL) 10 mg tablet Take 10 mg by mouth daily.  aspirin 81 mg chewable tablet Take 1 Tab by mouth daily.  levothyroxine (SYNTHROID) 50 mcg tablet Take 50 mcg by mouth Daily (before breakfast).  cyanocobalamin (VITAMIN B-12) 1,000 mcg tablet Take 1,000 mcg by mouth daily. Allergies   Allergen Reactions    Demerol [Meperidine] Unknown (comments)    Morphine Unknown (comments)    Penicillins Unknown (comments)    Demerol [Meperidine] Other (comments)     Unsure of reaction    Morphine Other (comments)     Unsure of reaction    Pcn [Penicillins] Other (comments)     unknown        Review of Systems:  A comprehensive review of systems was negative except for that written in the HPI. Mental Status: severe dementia    Objective:     Patient Vitals for the past 8 hrs:   BP Temp Pulse Resp SpO2 Height Weight   10/27/17 1011 - - 64 14 94 % - -   10/27/17 1002 - - 66 18 90 % - -   10/27/17 0933 - - (!) 59 17 95 % - -   10/27/17 0931 - - (!) 59 16 97 % - -   10/27/17 0830 112/46 - 62 12 96 % - -   10/27/17 0822 - - 62 16 97 % - -   10/27/17 0821 115/50 97.9 °F (36.6 °C) 60 17 97 % 5' 4\" (1.626 m) 52 kg (114 lb 10.2 oz)     Temp (24hrs), Av.9 °F (36.6 °C), Min:97.9 °F (36.6 °C), Max:97.9 °F (36.6 °C)      Gen: Frail,  in no acute distress   HEENT: Pink conjunctivae, hearing intact to voice, dry lips. Neck: Supple  Resp: No respiratory distress   Card: RRR, palpable distal pulse-equal bilaterally, birsk cap refill all distal digits   Abd: Soft, non-distended  Musc: right short arm cast intact. Right elbow +TTP, very strong with resisted flex/ext of the elbow. No silvestre/noel instability.   LE equal length, pain with passive right hip motion, very guarded throughout exam.  Skin: No skin breakdown noted. Skin warm, pink, dry  Neuro: Cranial nerves are grossly intact, no focal motor weakness, follows commands appropriately   Psych: poor insight, resting but easily agitated when uncovered to exam extremities. Imaging Review:   INDICATION:   Trauma   COMPARISON: September 3, 2017   FINDINGS:   AP view of the pelvis demonstrates chronic left superior and inferior pubic rami  fractures. Probable chronic right pubic fracture near the pubic symphysis  similar to prior CT scan. There is irregularity of the greater trochanter of the  right femur. Bowel gas obscures portions of the sacrum and the sacroiliac joints  appear intact.   IMPRESSION: Acute fracture greater trochanter of the right femur. No definite change in  chronic pubic rami fractures.   INDICATION:   Trauma     EXAMINATION:  ELBOW RADIOGRAPHS   COMPARISON:  None   FINDINGS:  AP, lateral, views of the right elbow demonstrate irregularity of the distal  humerus just above the medial upper condyle. Alignment on the 2 provided views  is essentially normal with no dislocation, though somewhat difficult to evaluate  for effusion on lateral view.       IMPRESSION:Cortical irregularity of the distal humerus just above the medial epicondyles  suspicious for fracture, though slight technical limitations as above. Labs: No results found for this or any previous visit (from the past 24 hour(s)). Impression:     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    Closed right ankle fracture 08/25/2017    Hypoxemia 01/25/2016    Closed olecranon fracture 01/25/2016    UTI (lower urinary tract infection) 01/25/2016    Dementia 01/25/2016    Fall 09/15/2012    Hyponatremia 09/15/2012    Diabetes mellitus ([facility] 09/15/2012     Active Problems:    * No active hospital problems.  *      Plan:   CT of the right hip confirms greater troch fracture-    Right greater troch fracture-- non-op treatment  Can wt bear as tolerated with max assist for transfer. .. Wheelchair primary mode of mobility   Unlikely right elbow fracture. F/u in the office 2 weeks- call for appt- [phone]    Dr. Price Pod aware and agrees with plan as above.         Feliciano Ngo, PA-C  [employer]

## 2017-11-30 ENCOUNTER — APPOINTMENT (OUTPATIENT)
Dept: CT IMAGING | Age: 82
End: 2017-11-30
Attending: EMERGENCY MEDICINE
Payer: MEDICARE

## 2017-11-30 ENCOUNTER — APPOINTMENT (OUTPATIENT)
Dept: GENERAL RADIOLOGY | Age: 82
End: 2017-11-30
Attending: EMERGENCY MEDICINE
Payer: MEDICARE

## 2017-11-30 ENCOUNTER — HOSPITAL ENCOUNTER (EMERGENCY)
Age: 82
Discharge: HOME | End: 2017-11-30
Attending: EMERGENCY MEDICINE | Admitting: EMERGENCY MEDICINE
Payer: MEDICARE

## 2017-11-30 VITALS
OXYGEN SATURATION: 96 % | HEIGHT: 64 IN | HEART RATE: 61 BPM | TEMPERATURE: 98.5 F | DIASTOLIC BLOOD PRESSURE: 47 MMHG | WEIGHT: 114.64 LBS | SYSTOLIC BLOOD PRESSURE: 121 MMHG | RESPIRATION RATE: 14 BRPM | BODY MASS INDEX: 19.57 KG/M2
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
## 2017-11-30 DIAGNOSIS — S39.011A STRAIN OF ABDOMINAL MUSCLE, INITIAL ENCOUNTER: ICD-10-CM

## 2017-11-30 DIAGNOSIS — S60.221A CONTUSION OF RIGHT HAND, INITIAL ENCOUNTER: ICD-10-CM

## 2017-11-30 LAB — CREAT BLD-MCNC: 1 MG/DL (ref 0.6–1.3)

## 2017-11-30 PROCEDURE — 73130 X-RAY EXAM OF HAND: CPT

## 2017-11-30 PROCEDURE — 74177 CT ABD & PELVIS W/CONTRAST: CPT

## 2017-11-30 PROCEDURE — 99284 EMERGENCY DEPT VISIT MOD MDM: CPT

## 2017-11-30 PROCEDURE — 74011636320 HC RX REV CODE- 636/320: Performed by: EMERGENCY MEDICINE

## 2017-11-30 PROCEDURE — 82565 ASSAY OF CREATININE: CPT

## 2017-11-30 PROCEDURE — 74011250636 HC RX REV CODE- 250/636: Performed by: EMERGENCY MEDICINE

## 2017-11-30 PROCEDURE — 74011250637 HC RX REV CODE- 250/637: Performed by: EMERGENCY MEDICINE

## 2017-11-30 RX ORDER — ACETAMINOPHEN 325 MG/1
650 TABLET ORAL
Qty: 20 TAB | Refills: 0 | Status: SHIPPED | OUTPATIENT
Start: 2017-11-30

## 2017-11-30 RX ORDER — ADHESIVE BANDAGE
30 BANDAGE TOPICAL DAILY PRN
COMMUNITY
End: 2018-03-20

## 2017-11-30 RX ORDER — BUSPIRONE HYDROCHLORIDE 10 MG/1
10 TABLET ORAL 2 TIMES DAILY
COMMUNITY

## 2017-11-30 RX ORDER — SODIUM CHLORIDE 0.9 % (FLUSH) 0.9 %
10 SYRINGE (ML) INJECTION
Status: COMPLETED | OUTPATIENT
Start: 2017-11-30 | End: 2017-11-30

## 2017-11-30 RX ORDER — ACETAMINOPHEN 325 MG/1
650 TABLET ORAL
Status: COMPLETED | OUTPATIENT
Start: 2017-11-30 | End: 2017-11-30

## 2017-11-30 RX ORDER — SODIUM CHLORIDE 9 MG/ML
50 INJECTION, SOLUTION INTRAVENOUS
Status: COMPLETED | OUTPATIENT
Start: 2017-11-30 | End: 2017-11-30

## 2017-11-30 RX ADMIN — IOPAMIDOL 80 ML: 755 INJECTION, SOLUTION INTRAVENOUS at 09:00

## 2017-11-30 RX ADMIN — ACETAMINOPHEN 650 MG: 325 TABLET ORAL at 08:30

## 2017-11-30 RX ADMIN — Medication 10 ML: at 09:00

## 2017-11-30 RX ADMIN — SODIUM CHLORIDE 50 ML/HR: 900 INJECTION, SOLUTION INTRAVENOUS at 09:00

## 2017-11-30 NOTE — ED NOTES
Dr Mateusz Rueda reviewed discharge instructions with the patient and her daughter. The patient and her daughter verbalized understanding. All questions and concerns were addressed. The patient is discharged via wheelchair in the care of family members with instructions and prescriptions in hand. Pt is alert and oriented x 4. Respirations are clear and unlabored.

## 2017-11-30 NOTE — DISCHARGE INSTRUCTIONS
Abdominal Strain: Rehab Exercises  Your Care Instructions  Here are some examples of typical rehabilitation exercises for your condition. Start each exercise slowly. Ease off the exercise if you start to have pain. Your doctor or physical therapist will tell you when you can start these exercises and which ones will work best for you. How to do the exercises  Tummy tuck    1. Lie on your back with your knees bent. Place two fingers just inside your hip bones so you can feel your lower belly muscles. 2. Take a deep breath in.  3. As you breathe out, pull your belly button in toward your spine, as if you are trying to zip up a tight pair of jeans. You should feel your lower belly muscles pull slightly away from your fingers as the muscles tighten. 4. Hold for about 6 seconds, but do not hold your breath. 5. Relax up to 10 seconds. 6. Repeat 8 to 12 times. 7. Repeat several times a day, and try to hold your lower belly muscles in for longer as you get stronger. 8. Practice doing this exercise while you are standing, such as when you are standing in line, or sitting. Pelvic tilt    1. Lie on your back with your knees bent. 2. \"Brace\" your stomach-tighten your muscles by pulling in and imagining your belly button moving toward your spine. 3. Press your lower back into the floor. You should feel your hips and pelvis rock back. 4. Hold for 6 seconds while breathing smoothly. 5. Relax and allow your pelvis and hips to rock forward. 6. Repeat 8 to 12 times. Curl-up    1. Lie down with your knees bent and your arms at your sides. Keep your feet flat on the floor. 2. Lift your head and shoulders up a few inches. At the same time, raise your arms to about thigh level. 3. Hold for 6 seconds. 4. Relax and return to your starting position. 5. Repeat 8 to 12 times. Diagonal curl-up    1. Lie down with your knees bent and your arms at your sides. Keep your feet flat on the floor.   2. Lift your head and shoulders up. At the same time, reach to one side with both arms. 3. Hold for 6 seconds. 4. Relax and return to your starting position. 5. Repeat 8 to 12 times. 6. Repeat the same steps on your other side. Follow-up care is a key part of your treatment and safety. Be sure to make and go to all appointments, and call your doctor if you are having problems. It's also a good idea to know your test results and keep a list of the medicines you take. Where can you learn more? Go to http://mimi-lupe.info/. Enter Z530 in the search box to learn more about \"Abdominal Strain: Rehab Exercises. \"  Current as of: March 21, 2017  Content Version: 11.4  © 7536-1947 [employer]. Care instructions adapted under license by [employer] (which disclaims liability or warranty for this information). If you have questions about a medical condition or this instruction, always ask your healthcare professional. [place] [address] any warranty or liability for your use of this information.

## 2017-11-30 NOTE — ED PROVIDER NOTES
EMERGENCY DEPARTMENT HISTORY AND PHYSICAL EXAM      Date: 11/30/2017  Patient Name: Valeria Cisneros    History of Presenting Illness     Chief Complaint   Patient presents with   [employer] Fall    Abdominal Pain    Finger Pain     rt 5th finger bruising, swelling, possible deformity       History Provided By: Patient and EMS    HPI: Valeria Cisneros, 80 y.o. female with PMHx significant for dementia, presents via EMS to the ED with cc of progressively worsening right hand pain with associated bruising, right-sided neck pain, generalized abdominal pain, and an abrasion under her right breast s/p a fall that occurred between [id number] yesterday. The patient states that she is unable to recall the events prior to and during the fall. Per EMS, there is unknown LOC or head injury. Per EMS, the patient was administered Tramadol 50 mg en route. Per EMS, the patient takes ASA 81 mg. She denies taking anticoagulants. The patient denies all other complaints at this time. The patient's history is limited at this time secondary to the pt's history of dementia. PCP: PROVIDER UNKNOWN    Current Outpatient Prescriptions   Medication Sig Dispense Refill    magnesium hydroxide (BURNS MILK OF MAGNESIA) 400 mg/5 mL suspension Take 30 mL by mouth daily as needed for Constipation.  busPIRone (BUSPAR) 10 mg tablet Take 10 mg by mouth two (2) times a day.  acetaminophen (TYLENOL) 325 mg tablet Take 2 Tabs by mouth every six (6) hours as needed for Pain. 20 Tab 0    traMADol (ULTRAM) 50 mg tablet Take 1 Tab by mouth every six (6) hours as needed for Pain. Max Daily Amount: 200 mg. 20 Tab 0    polyethylene glycol (MIRALAX) 17 gram packet Take 1 Packet by mouth two (2) times a day. 30 Each 0    Mineral Oil drop 2 Drops by Otic route every seven (7) days.  risperiDONE (RISPERDAL) 0.25 mg tablet Take 0.25 mg by mouth two (2) times a day.  Indications: mood swings      CALCIUM CARBONATE (OYSTER SHELL CALCIUM 500 PO) Take 500 mg by mouth daily.  ergocalciferol (VITAMIN D2) 50,000 unit capsule Take 50,000 Units by mouth every seven (7) days.  vitamin e (E GEMS) 1,000 unit capsule Take 1,000 Units by mouth daily.  mirtazapine (REMERON) 15 mg tablet Take 15 mg by mouth nightly. Indications: appetite      amLODIPine (NORVASC) 10 mg tablet Take 10 mg by mouth daily.  docusate sodium (COLACE) 100 mg capsule Take 100 mg by mouth daily.  lisinopril (PRINIVIL, ZESTRIL) 10 mg tablet Take 10 mg by mouth daily.  aspirin 81 mg chewable tablet Take 1 Tab by mouth daily.  levothyroxine (SYNTHROID) 50 mcg tablet Take 50 mcg by mouth Daily (before breakfast).  cyanocobalamin (VITAMIN B-12) 1,000 mcg tablet Take 1,000 mcg by mouth daily. Past History     Past Medical History:  Past Medical History:   Diagnosis Date    Diabetes ([facility]     Diabetes mellitus ([facility]     [facility]     Hypertension     Hypothyroid     Hypothyroidism        Past Surgical History:  Past Surgical History:   Procedure Laterality Date    HX APPENDECTOMY      HX CHOLECYSTECTOMY      HX HYSTERECTOMY         Family History:  Family History   Problem Relation Age of Onset    Diabetes Father     Cancer Mother      ovarian        Social History:  Social History   Substance Use Topics    Smoking status: Never Smoker    Smokeless tobacco: Never Used    Alcohol use No       Allergies: Allergies   Allergen Reactions    Demerol [Meperidine] Unknown (comments)    Morphine Unknown (comments)    Penicillins Unknown (comments)    Demerol [Meperidine] Other (comments)     Unsure of reaction    Morphine Other (comments)     Unsure of reaction    Pcn [Penicillins] Other (comments)     unknown         Review of Systems   Review of Systems   Constitutional: Negative for chills and fever. Respiratory: Negative for cough and shortness of breath. Cardiovascular: Negative for chest pain. Gastrointestinal: Positive for abdominal pain. Negative for constipation, diarrhea, nausea and vomiting. Musculoskeletal: Positive for arthralgias (Right hand) and neck pain (Right-sided neck). Skin: Positive for color change (Bruising to right hand) and wound (Abrasions under right breast). Neurological: Negative for weakness and numbness. All other systems reviewed and are negative. Physical Exam   Physical Exam   Constitutional: She appears well-developed. HENT:   Head: Normocephalic. Eyes: EOM are normal. Pupils are equal, round, and reactive to light. Neck: Normal range of motion. Mild tenderness to right para cervical musculatures. Cardiovascular: Normal rate and regular rhythm. Pulmonary/Chest: Effort normal and breath sounds normal. She exhibits no tenderness. Abdominal: Soft. She exhibits distension (Slightly). There is generalized tenderness. Musculoskeletal: Normal range of motion. She exhibits no tenderness or deformity. 5/5 strength in Bilateral upper and lower extremities. No tenderness over cervical, thoracic and lumbar spines. Contusion/eccymosis to entire right pinky and base of right pinky with edema and TTP. Neurological: She is alert. Oriented to person, location, and season. Able to move all extremities equally. Skin: Skin is warm and dry. No abrasions or lacerations   Psychiatric: She has a normal mood and affect. Diagnostic Study Results     Labs -     Recent Results (from the past 12 hour(s))   POC CREATININE    Collection Time: 11/30/17  8:36 AM   Result Value Ref Range    Creatinine (POC) 1.0 0.6 - 1.3 MG/DL    GFRAA, POC >60 >60 ml/min/1.73m2    GFRNA, POC 52 (L) >60 ml/min/1.73m2       Radiologic Studies -   CT Results  (Last 48 hours)               11/30/17 0900  CT ABD PELV W CONT Final result    Impression:  IMPRESSION:    1. No evidence of solid or hollow organ injury or other acute abdominal or   pelvic abnormality.    2. Atelectasis or scar at the lung bases. 3. Atherosclerotic abdominal aorta without aneurysm. 4. Lumbar spondylosis with compression deformities of L3 and L4 and compression   deformity of T12.   5. Status post cholecystectomy. 6. Status post hysterectomy. 7. Status post appendectomy. Narrative:  EXAM: CT ABDOMEN PELVIS WITH CONTRAST   INDICATION:  Fall. COMPARISON: 10/27/2017. CONTRAST: 80 mL of Isovue-370. TECHNIQUE:    Multislice helical CT was performed from the diaphragm to the symphysis pubis   during uneventful rapid bolus intravenous contrast administration. Oral contrast   was not administered. Contiguous 5 mm axial images were reconstructed and lung   and soft tissue windows were generated. Coronal and sagittal reformations were   generated. CT dose reduction was achieved through use of a standardized protocol   tailored for this examination and automatic exposure control for dose   modulation. FINDINGS:   LOWER CHEST: There is atelectasis and/or scar at the lung bases. ABDOMEN:   Liver: The liver is normal in size and contour with no focal abnormality. Gallbladder and bile ducts: The gallbladder is absent and there is mild   extrahepatic duct dilatation without a distal mass or stone. Spleen: No abnormality. Pancreas: No abnormality. Adrenal glands: No abnormality. Kidneys: No abnormality. PELVIS:   Reproductive organs: The uterus is absent. Bladder: The urinary bladder is distended and the wall is minimally thickened. BOWEL AND MESENTERY: The small bowel is normal.  There is no mesenteric mass or   adenopathy. The appendix is absent. PERITONEUM: There is no ascites or free intraperitoneal air. RETROPERITONEUM: The aorta is atherosclerotic and tapers without aneurysm. There   is no retroperitoneal adenopathy or mass. There is no pelvic mass or adenopathy. BONES AND SOFT TISSUES: There are right subcostal sutures.  There are   degenerative changes of the lumbar spine. There is a compression deformity of   T12 and there is mild compression of L3 and L4. EXAM:  XR HAND RT MIN 3 V     INDICATION:  Status post fall with bruising right fifth digit.     COMPARISON: None.     FINDINGS: Three views of the right hand demonstrate significant degenerative  changes in the DIP and PIP joints of all digits as well as the second and third  MCP joints and first CMC joint. There is lateral angulation at the fifth PIP  joint.       IMPRESSION  IMPRESSION:  Lateral angulation at the fifth PIP joint of indeterminate age and  potentially related to significant degenerative changes. Degenerative changes as  described above. No acute fracture is identified.                 Medical Decision Making   I am the first provider for this patient. I reviewed the vital signs, available nursing notes, past medical history, past surgical history, family history and social history. Vital Signs-Reviewed the patient's vital signs. Patient Vitals for the past 12 hrs:   Temp Pulse Resp BP SpO2   11/30/17 0818 98.5 °F (36.9 °C) 61 14 121/47 96 %       Records Reviewed: Nursing Notes and Old Medical Records    Provider Notes (Medical Decision Making):   Patient presents after fall with right hand pain and diffuse abdominal pain. Will get imaging to further evaluate for fracture vs. Dislocation vs. Contusion. Given the abdominal findings and lack of story, will obtain CT. Will treat with analgesics at this time and continue to monitor for changes in mentation. I have discussed with the patient how to reduce likelihood of falling at home by removing throw rugs, loose wires or other objects from floor, installing hand-rails in bathrooms, tubs and halls, and leaving some lights on at night. ED Course:   Initial assessment performed. The patients presenting problems have been discussed, and they are in agreement with the care plan formulated and outlined with them. I have encouraged them to ask questions as they arise throughout their visit. Progress Note:  9:40 AM  The patient was informed of her negative CT results. She conveys her understanding of these results. Pending xray. Written by HERNESTO Londonibe, as dictated by Wyatt Prado MD.    Progress Note:  9:51 AM  The pt has a negative xray in the ED today. Will discharge home. Written by HERNESTO Londonibe, as dictated by Wyatt Prado MD.    Critical Care Time: 0 minutes    Discharge Note:  9:52 AM  The pt is ready for discharge. The pt's signs, symptoms, diagnosis, and discharge instructions have been discussed and pt has conveyed their understanding. The pt is to follow up as recommended or return to ER should their symptoms worsen. Plan has been discussed and pt is in agreement. PLAN:  1. Current Discharge Medication List      CONTINUE these medications which have CHANGED    Details   acetaminophen (TYLENOL) 325 mg tablet Take 2 Tabs by mouth every six (6) hours as needed for Pain. Qty: 20 Tab, Refills: 0           2. Follow-up Information     Follow up With Details Comments Contact Info    Provider Unknown   Patient not available to ask          Return to ED if worse     Diagnosis     Clinical Impression:   1. Fall, initial encounter    2. Strain of abdominal muscle, initial encounter        Attestations: This note is prepared by Sascha Colon, acting as a Scribe for Wyatt Prado MD.    Wyatt Prado MD: The scribe's documentation has been prepared under my direction and personally reviewed by me in its entirety. I confirm that the notes above accurately reflects all work, treatment, procedures, and medical decision making performed by me. This note will not be viewable in [address]

## 2018-03-11 ENCOUNTER — HOSPITAL ENCOUNTER (INPATIENT)
Age: 83
LOS: 8 days | Discharge: NURSING, REHAB OR LONG-TERM CARE | DRG: 394 | End: 2018-03-20
Attending: EMERGENCY MEDICINE | Admitting: INTERNAL MEDICINE
Payer: MEDICARE

## 2018-03-11 ENCOUNTER — APPOINTMENT (OUTPATIENT)
Dept: CT IMAGING | Age: 83
DRG: 394 | End: 2018-03-11
Attending: EMERGENCY MEDICINE
Payer: MEDICARE

## 2018-03-11 DIAGNOSIS — K52.9 COLITIS, ACUTE: Primary | ICD-10-CM

## 2018-03-11 DIAGNOSIS — S32.000A LUMBAR COMPRESSION FRACTURE, CLOSED, INITIAL ENCOUNTER (HCC): ICD-10-CM

## 2018-03-11 DIAGNOSIS — N39.0 URINARY TRACT INFECTION WITHOUT HEMATURIA, SITE UNSPECIFIED: ICD-10-CM

## 2018-03-11 LAB
ALBUMIN SERPL-MCNC: 3 G/DL (ref 3.5–5)
ALBUMIN/GLOB SERPL: 0.7 {RATIO} (ref 1.1–2.2)
ALP SERPL-CCNC: 161 U/L (ref 45–117)
ALT SERPL-CCNC: 14 U/L (ref 12–78)
ANION GAP SERPL CALC-SCNC: 7 MMOL/L (ref 5–15)
APTT PPP: 25.2 SEC (ref 22.1–32)
AST SERPL-CCNC: 16 U/L (ref 15–37)
BASOPHILS # BLD: 0 K/UL (ref 0–0.1)
BASOPHILS NFR BLD: 0 % (ref 0–1)
BILIRUB SERPL-MCNC: 0.5 MG/DL (ref 0.2–1)
BUN SERPL-MCNC: 36 MG/DL (ref 6–20)
BUN/CREAT SERPL: 29 (ref 12–20)
CALCIUM SERPL-MCNC: 8.5 MG/DL (ref 8.5–10.1)
CHLORIDE SERPL-SCNC: 100 MMOL/L (ref 97–108)
CO2 SERPL-SCNC: 28 MMOL/L (ref 21–32)
CREAT SERPL-MCNC: 1.25 MG/DL (ref 0.55–1.02)
DIFFERENTIAL METHOD BLD: ABNORMAL
EOSINOPHIL # BLD: 0 K/UL (ref 0–0.4)
EOSINOPHIL NFR BLD: 0 % (ref 0–7)
ERYTHROCYTE [DISTWIDTH] IN BLOOD BY AUTOMATED COUNT: 17.6 % (ref 11.5–14.5)
GLOBULIN SER CALC-MCNC: 4.2 G/DL (ref 2–4)
GLUCOSE SERPL-MCNC: 222 MG/DL (ref 65–100)
HCT VFR BLD AUTO: 38 % (ref 35–47)
HEMOCCULT STL QL: POSITIVE
HGB BLD-MCNC: 12.2 G/DL (ref 11.5–16)
IMM GRANULOCYTES # BLD: 0.8 K/UL (ref 0–0.04)
IMM GRANULOCYTES NFR BLD AUTO: 4 % (ref 0–0.5)
INR PPP: 1.1 (ref 0.9–1.1)
LACTATE SERPL-SCNC: 1.5 MMOL/L (ref 0.4–2)
LYMPHOCYTES # BLD: 1 K/UL (ref 0.8–3.5)
LYMPHOCYTES NFR BLD: 5 % (ref 12–49)
MCH RBC QN AUTO: 26.1 PG (ref 26–34)
MCHC RBC AUTO-ENTMCNC: 32.1 G/DL (ref 30–36.5)
MCV RBC AUTO: 81.4 FL (ref 80–99)
MONOCYTES # BLD: 1 K/UL (ref 0–1)
MONOCYTES NFR BLD: 5 % (ref 5–13)
NEUTS SEG # BLD: 17.7 K/UL (ref 1.8–8)
NEUTS SEG NFR BLD: 86 % (ref 32–75)
NRBC # BLD: 0 K/UL (ref 0–0.01)
NRBC BLD-RTO: 0 PER 100 WBC
PLATELET # BLD AUTO: 261 K/UL (ref 150–400)
PMV BLD AUTO: 9.4 FL (ref 8.9–12.9)
POTASSIUM SERPL-SCNC: 4.5 MMOL/L (ref 3.5–5.1)
PROT SERPL-MCNC: 7.2 G/DL (ref 6.4–8.2)
PROTHROMBIN TIME: 10.9 SEC (ref 9–11.1)
RBC # BLD AUTO: 4.67 M/UL (ref 3.8–5.2)
RBC MORPH BLD: ABNORMAL
RBC MORPH BLD: ABNORMAL
SODIUM SERPL-SCNC: 135 MMOL/L (ref 136–145)
THERAPEUTIC RANGE,PTTT: NORMAL SECS (ref 58–77)
WBC # BLD AUTO: 20.5 K/UL (ref 3.6–11)

## 2018-03-11 PROCEDURE — 96365 THER/PROPH/DIAG IV INF INIT: CPT

## 2018-03-11 PROCEDURE — 83605 ASSAY OF LACTIC ACID: CPT | Performed by: EMERGENCY MEDICINE

## 2018-03-11 PROCEDURE — 96361 HYDRATE IV INFUSION ADD-ON: CPT

## 2018-03-11 PROCEDURE — 82272 OCCULT BLD FECES 1-3 TESTS: CPT | Performed by: EMERGENCY MEDICINE

## 2018-03-11 PROCEDURE — 99285 EMERGENCY DEPT VISIT HI MDM: CPT

## 2018-03-11 PROCEDURE — 80053 COMPREHEN METABOLIC PANEL: CPT | Performed by: EMERGENCY MEDICINE

## 2018-03-11 PROCEDURE — 74011636320 HC RX REV CODE- 636/320: Performed by: EMERGENCY MEDICINE

## 2018-03-11 PROCEDURE — 85610 PROTHROMBIN TIME: CPT | Performed by: EMERGENCY MEDICINE

## 2018-03-11 PROCEDURE — 74177 CT ABD & PELVIS W/CONTRAST: CPT

## 2018-03-11 PROCEDURE — 85730 THROMBOPLASTIN TIME PARTIAL: CPT | Performed by: EMERGENCY MEDICINE

## 2018-03-11 PROCEDURE — 36415 COLL VENOUS BLD VENIPUNCTURE: CPT | Performed by: EMERGENCY MEDICINE

## 2018-03-11 PROCEDURE — 74011250636 HC RX REV CODE- 250/636: Performed by: EMERGENCY MEDICINE

## 2018-03-11 PROCEDURE — 85025 COMPLETE CBC W/AUTO DIFF WBC: CPT | Performed by: EMERGENCY MEDICINE

## 2018-03-11 RX ORDER — SODIUM CHLORIDE 0.9 % (FLUSH) 0.9 %
10 SYRINGE (ML) INJECTION
Status: COMPLETED | OUTPATIENT
Start: 2018-03-11 | End: 2018-03-11

## 2018-03-11 RX ORDER — SODIUM CHLORIDE 9 MG/ML
50 INJECTION, SOLUTION INTRAVENOUS
Status: COMPLETED | OUTPATIENT
Start: 2018-03-11 | End: 2018-03-12

## 2018-03-11 RX ADMIN — SODIUM CHLORIDE 1000 ML: 900 INJECTION, SOLUTION INTRAVENOUS at 22:26

## 2018-03-11 RX ADMIN — Medication 10 ML: at 23:40

## 2018-03-11 RX ADMIN — IOPAMIDOL 100 ML: 755 INJECTION, SOLUTION INTRAVENOUS at 23:40

## 2018-03-11 RX ADMIN — SODIUM CHLORIDE 50 ML/HR: 900 INJECTION, SOLUTION INTRAVENOUS at 23:40

## 2018-03-11 NOTE — IP AVS SNAPSHOT
Summary of Care Report The Summary of Care report has been created to help improve care coordination. Users with access to [employer] or [address] (Web-based application) may access additional patient information including the Discharge Summary. If you are not currently a [email or web address] [facility] user and need more information, please call the number listed below in the [place] [address] section and ask to be connected with Medical Records. Facility Information Name Address Phone [address] [address] [phone] Patient Information Patient Name Sex  Luis Manuel Donahue ([id number]) Female 9/10/1922 Discharge Information Admitting Provider Service Area Unit Mayela Torres MD / [facility] 2 General Surgery / [phone] Discharge Provider Discharge Date/Time Discharge Disposition Destination (none) (none) (none) (none) Patient Language Language ENGLISH [13] Hospital Problems as of 3/15/2018  Reviewed: 3/12/2018  3:19 AM by Mayela Torres MD  
  
  
  
 Class Noted - Resolved Last Modified POA Active Problems Hyponatremia  9/15/2012 - Present 3/12/2018 by Mayela Torres MD Yes Entered by Preston Ozuna Lower urinary tract infectious disease  2016 - Present 3/12/2018 by Mayela Torres MD Yes Entered by Aby Osborn MD  
  Dementia  2016 - Present 3/12/2018 by Mayela Torres MD Yes Entered by Aby Osborn MD  
  Colitis  3/12/2018 - Present 3/12/2018 by Mayela Torres MD Unknown Entered by Mayela Torres MD  
  Hypotension  3/12/2018 - Present 3/12/2018 by Mayela Torres MD Unknown Entered by Mayela Torres MD  
  
Non-Hospital Problems as of 3/15/2018  Reviewed: 3/12/2018  3:19 AM by Mayela Torres MD  
  
  
  
 Class Noted - Resolved Last Modified Active Problems Fall  9/15/2012 - Present 9/19/2012 Entered by Britney Tipton Diabetes mellitus ([facility] [address]  9/15/2012 - Present 9/19/2012 Entered by Britney Tipton Hypoxemia  1/25/2016 - Present 1/25/2016 by Kasey Ojeda MD  
  Entered by Kasey Ojeda MD  
  Closed olecranon fracture  1/25/2016 - Present 1/25/2016 by Kasey Ojeda MD  
  Entered by Kasey Ojeda MD  
  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  
  Entered by Kasey Ojeda MD  
  
You are allergic to the following Allergen Reactions Demerol (Meperidine) Unknown (comments) Morphine Unknown (comments) Penicillins Unknown (comments) Demerol (Meperidine) Other (comments) Unsure of reaction Morphine Other (comments) Unsure of reaction Pcn (Penicillins) Other (comments)  
 unknown Current Discharge Medication List  
  
START taking these medications Dose & Instructions Dispensing Information Comments  
 levoFLOXacin 750 mg tablet Commonly known as:  Paralee Steven Dose:  750 mg Take 1 Tab by mouth every fourty-eight (48) hours for 6 days. Quantity:  3 Tab Refills:  0  
   
 metroNIDAZOLE 500 mg tablet Commonly known as:  FLAGYL Dose:  500 mg Take 1 Tab by mouth two (2) times a day for 6 days. Quantity:  12 Tab Refills:  0 CONTINUE these medications which have NOT CHANGED Dose & Instructions Dispensing Information Comments  
 acetaminophen 325 mg tablet Commonly known as:  TYLENOL Dose:  650 mg Take 2 Tabs by mouth every six (6) hours as needed for Pain. Quantity:  20 Tab Refills:  0  
   
 amLODIPine 10 mg tablet Commonly known as:  Lupe Som Dose:  10 mg Take 10 mg by mouth daily. Refills:  0  
   
 aspirin 81 mg chewable tablet Dose:  81 mg Take 1 Tab by mouth daily. Refills:  0  
   
 busPIRone 10 mg tablet Commonly known as:  BUSPAR Dose:  10 mg Take 10 mg by mouth two (2) times a day. Refills:  0  
   
 docusate sodium 100 mg capsule Commonly known as:  Juliet Eliseo Dose:  100 mg Take 100 mg by mouth daily. Refills:  0  
   
 levothyroxine 50 mcg tablet Commonly known as:  SYNTHROID Dose:  50 mcg Take 50 mcg by mouth Daily (before breakfast). Refills:  0  
   
 lisinopril 10 mg tablet Commonly known as:  Luma Grebe Dose:  10 mg Take 10 mg by mouth daily. Refills:  0 Mineral Oil Drop Dose:  2 Drop  
2 Drops by Otic route every seven (7) days. Refills:  0  
   
 OYSTER SHELL CALCIUM 500 PO Dose:  500 mg Take 500 mg by mouth daily. Refills:  0 BURNS MILK OF MAGNESIA 400 mg/5 mL suspension Generic drug:  magnesium hydroxide Dose:  30 mL Take 30 mL by mouth daily as needed for Constipation. Refills:  0  
   
 polyethylene glycol 17 gram packet Commonly known as:  Samreen Arts Dose:  17 g Take 1 Packet by mouth two (2) times a day. Quantity:  30 Each Refills:  0 REMERON 15 mg tablet Generic drug:  mirtazapine Dose:  15 mg Take 15 mg by mouth nightly. Indications: appetite Refills:  0  
   
 risperiDONE 0.25 mg tablet Commonly known as:  RisperDAL Dose:  0.25 mg Take 0.25 mg by mouth two (2) times a day. Indications: mood swings Refills:  0  
   
 traMADol 50 mg tablet Commonly known as:  ULTRAM  
 Dose:  50 mg Take 1 Tab by mouth every six (6) hours as needed for Pain. Max Daily Amount: 200 mg. Quantity:  20 Tab Refills:  0  
   
 VITAMIN B-12 1,000 mcg tablet Generic drug:  cyanocobalamin Dose:  1000 mcg Take 1,000 mcg by mouth daily. Refills:  0  
   
 VITAMIN D2 50,000 unit capsule Generic drug:  ergocalciferol Dose:  69161 Units Take 50,000 Units by mouth every seven (7) days. Refills:  0  
   
 vitamin e 1,000 unit capsule Commonly known as:  E GEMS Dose:  1000 Units Take 1,000 Units by mouth daily. Refills:  0 Current Immunizations Name Date Influenza Vaccine (Quad) PF  Deferred () Influenza Vaccine Split 9/16/2012 TDAP Vaccine 9/14/2012 Follow-up Information None Discharge Instructions None Chart Review Routing History Recipient Method Report Sent By Moises Caraballo MD  
Fax: [phone] Phone: [phone] Fax [place] MD NOTES AUTO ROUTING REPORT MD Iveth Hoffmann 1/25/2016  8:04 PM 01/25/2016 Davon Caraballo MD  
Fax: [phone] Phone: [phone] Fax [place] MD NOTES AUTO ROUTING REPORT MD Iveth Hoffmann 1/25/2016  8:06 PM 01/25/2016 Davon Caraballo MD  
Fax: [phone] Phone: [phone] Fax [place] MD NOTES AUTO ROUTING REPORT Sarah Urbina MD [[id number]] 1/27/2016 11:51 AM 01/27/2016 Provider Unknown, MD  
Patient not available to ask [address] IP Auto Routed Notes MD Iveth Hoffmann 9/3/2017  8:53 PM 09/03/2017 Provider Unknown, MD  
Patient not available to ask [address] IP Auto Routed Notes MD Iveth Hoffmann [place] 9/3/2017  8:54 PM 09/03/2017 Provider Unknown, MD  
Patient not available to ask [address] IP Auto Routed Notes Spike Cheney MD [[id number]] 9/7/2017  2:16 PM 09/07/2017 Provider Unknown, MD  
Patient not available to ask [address] IP Auto Routed Notes Sarah Urbina MD [[id number]] 3/12/2018  3:38 AM 03/12/2018

## 2018-03-11 NOTE — IP AVS SNAPSHOT
[address] [facility] 
[phone] Patient: Raven Mott MRN: [id number] VXT:[birth year] A check roger indicates which time of day the medication should be taken. My Medications START taking these medications Instructions Each Dose to Equal  
 Morning Noon Evening Bedtime  
 levoFLOXacin 750 mg tablet Commonly known as:  Oli Pizarro Your last dose was: Your next dose is: Take 1 Tab by mouth every fourty-eight (48) hours for 6 days. 750 mg  
    
   
   
   
  
 metroNIDAZOLE 500 mg tablet Commonly known as:  FLAGYL Your last dose was: Your next dose is: Take 1 Tab by mouth two (2) times a day for 6 days. 500 mg CONTINUE taking these medications Instructions Each Dose to Equal  
 Morning Noon Evening Bedtime  
 acetaminophen 325 mg tablet Commonly known as:  TYLENOL Your last dose was: Your next dose is: Take 2 Tabs by mouth every six (6) hours as needed for Pain. 650 mg  
    
   
   
   
  
 amLODIPine 10 mg tablet Commonly known as:  Rena Lane Your last dose was: Your next dose is: Take 10 mg by mouth daily. 10 mg  
    
   
   
   
  
 aspirin 81 mg chewable tablet Your last dose was: Your next dose is: Take 1 Tab by mouth daily. 81 mg  
    
   
   
   
  
 busPIRone 10 mg tablet Commonly known as:  BUSPAR Your last dose was: Your next dose is: Take 10 mg by mouth two (2) times a day. 10 mg  
    
   
   
   
  
 docusate sodium 100 mg capsule Commonly known as:  Thomas Melara Your last dose was: Your next dose is: Take 100 mg by mouth daily. 100 mg  
    
   
   
   
  
 levothyroxine 50 mcg tablet Commonly known as:  SYNTHROID Your last dose was: Your next dose is: Take 50 mcg by mouth Daily (before breakfast). 50 mcg  
    
   
   
   
  
 lisinopril 10 mg tablet Commonly known as:  Ana Luisa Villarreal Your last dose was: Your next dose is: Take 10 mg by mouth daily. 10 mg  
    
   
   
   
  
 Mineral Oil Drop Your last dose was: Your next dose is:    
   
   
 2 Drops by Otic route every seven (7) days. 2 Drop OYSTER SHELL CALCIUM 500 PO Your last dose was: Your next dose is: Take 500 mg by mouth daily. 500 mg BURNS MILK OF MAGNESIA 400 mg/5 mL suspension Generic drug:  magnesium hydroxide Your last dose was: Your next dose is: Take 30 mL by mouth daily as needed for Constipation. 30 mL  
    
   
   
   
  
 polyethylene glycol 17 gram packet Commonly known as:  Chinyere Kendallrier Your last dose was: Your next dose is: Take 1 Packet by mouth two (2) times a day. 17 g REMERON 15 mg tablet Generic drug:  mirtazapine Your last dose was: Your next dose is: Take 15 mg by mouth nightly. Indications: appetite 15 mg  
    
   
   
   
  
 risperiDONE 0.25 mg tablet Commonly known as:  RisperDAL Your last dose was: Your next dose is: Take 0.25 mg by mouth two (2) times a day. Indications: mood swings 0.25 mg  
    
   
   
   
  
 traMADol 50 mg tablet Commonly known as:  ULTRAM  
   
Your last dose was: Your next dose is: Take 1 Tab by mouth every six (6) hours as needed for Pain. Max Daily Amount: 200 mg.  
 50 mg  
    
   
   
   
  
 VITAMIN B-12 1,000 mcg tablet Generic drug:  cyanocobalamin Your last dose was: Your next dose is: Take 1,000 mcg by mouth daily. 1000 mcg VITAMIN D2 50,000 unit capsule Generic drug:  ergocalciferol Your last dose was: Your next dose is: Take 50,000 Units by mouth every seven (7) days. 01989 Units  
    
   
   
   
  
 vitamin e 1,000 unit capsule Commonly known as:  E GEMS Your last dose was: Your next dose is: Take 1,000 Units by mouth daily. 1000 Units Where to Get Your Medications Information on where to get these meds will be given to you by the nurse or doctor. ! Ask your nurse or doctor about these medications  
  levoFLOXacin 750 mg tablet  
 metroNIDAZOLE 500 mg tablet

## 2018-03-12 PROBLEM — K52.9 COLITIS: Status: ACTIVE | Noted: 2018-03-12

## 2018-03-12 PROBLEM — I95.9 HYPOTENSION: Status: ACTIVE | Noted: 2018-03-12

## 2018-03-12 LAB
APPEARANCE UR: ABNORMAL
BACTERIA URNS QL MICRO: NEGATIVE /HPF
BILIRUB UR QL CFM: NEGATIVE
COLOR UR: ABNORMAL
EPITH CASTS URNS QL MICRO: ABNORMAL /LPF
GLUCOSE UR STRIP.AUTO-MCNC: NEGATIVE MG/DL
HGB UR QL STRIP: ABNORMAL
HYALINE CASTS URNS QL MICRO: ABNORMAL /LPF (ref 0–5)
KETONES UR QL STRIP.AUTO: ABNORMAL MG/DL
LEUKOCYTE ESTERASE UR QL STRIP.AUTO: ABNORMAL
NITRITE UR QL STRIP.AUTO: NEGATIVE
PH UR STRIP: 5 [PH] (ref 5–8)
PROT UR STRIP-MCNC: 30 MG/DL
RBC #/AREA URNS HPF: ABNORMAL /HPF (ref 0–5)
SP GR UR REFRACTOMETRY: 1.01 (ref 1–1.03)
UA: UC IF INDICATED,UAUC: ABNORMAL
UROBILINOGEN UR QL STRIP.AUTO: 1 EU/DL (ref 0.2–1)
WBC URNS QL MICRO: >100 /HPF (ref 0–4)

## 2018-03-12 PROCEDURE — 96367 TX/PROPH/DG ADDL SEQ IV INF: CPT

## 2018-03-12 PROCEDURE — 74011000250 HC RX REV CODE- 250: Performed by: INTERNAL MEDICINE

## 2018-03-12 PROCEDURE — 96365 THER/PROPH/DIAG IV INF INIT: CPT

## 2018-03-12 PROCEDURE — 74011000250 HC RX REV CODE- 250: Performed by: EMERGENCY MEDICINE

## 2018-03-12 PROCEDURE — 96366 THER/PROPH/DIAG IV INF ADDON: CPT

## 2018-03-12 PROCEDURE — 65270000029 HC RM PRIVATE

## 2018-03-12 PROCEDURE — 81001 URINALYSIS AUTO W/SCOPE: CPT | Performed by: EMERGENCY MEDICINE

## 2018-03-12 PROCEDURE — 74011250636 HC RX REV CODE- 250/636: Performed by: INTERNAL MEDICINE

## 2018-03-12 PROCEDURE — 87086 URINE CULTURE/COLONY COUNT: CPT | Performed by: EMERGENCY MEDICINE

## 2018-03-12 PROCEDURE — 74011250636 HC RX REV CODE- 250/636: Performed by: EMERGENCY MEDICINE

## 2018-03-12 PROCEDURE — C9113 INJ PANTOPRAZOLE SODIUM, VIA: HCPCS | Performed by: INTERNAL MEDICINE

## 2018-03-12 RX ORDER — SODIUM CHLORIDE 0.9 % (FLUSH) 0.9 %
5-10 SYRINGE (ML) INJECTION EVERY 8 HOURS
Status: DISCONTINUED | OUTPATIENT
Start: 2018-03-12 | End: 2018-03-20 | Stop reason: HOSPADM

## 2018-03-12 RX ORDER — METRONIDAZOLE 500 MG/100ML
500 INJECTION, SOLUTION INTRAVENOUS EVERY 8 HOURS
Status: DISCONTINUED | OUTPATIENT
Start: 2018-03-12 | End: 2018-03-12

## 2018-03-12 RX ORDER — TRAMADOL HYDROCHLORIDE 50 MG/1
50 TABLET ORAL
Status: CANCELLED | OUTPATIENT
Start: 2018-03-12

## 2018-03-12 RX ORDER — LEVOFLOXACIN 5 MG/ML
500 INJECTION, SOLUTION INTRAVENOUS EVERY 24 HOURS
Status: DISCONTINUED | OUTPATIENT
Start: 2018-03-12 | End: 2018-03-12

## 2018-03-12 RX ORDER — SODIUM CHLORIDE 9 MG/ML
75 INJECTION, SOLUTION INTRAVENOUS CONTINUOUS
Status: DISCONTINUED | OUTPATIENT
Start: 2018-03-12 | End: 2018-03-20 | Stop reason: HOSPADM

## 2018-03-12 RX ORDER — BUSPIRONE HYDROCHLORIDE 10 MG/1
10 TABLET ORAL 2 TIMES DAILY
Status: CANCELLED | OUTPATIENT
Start: 2018-03-12

## 2018-03-12 RX ORDER — POLYETHYLENE GLYCOL 3350 17 G/17G
17 POWDER, FOR SOLUTION ORAL 2 TIMES DAILY
Status: CANCELLED | OUTPATIENT
Start: 2018-03-12

## 2018-03-12 RX ORDER — LEVOFLOXACIN 5 MG/ML
750 INJECTION, SOLUTION INTRAVENOUS
Status: DISCONTINUED | OUTPATIENT
Start: 2018-03-14 | End: 2018-03-15 | Stop reason: ALTCHOICE

## 2018-03-12 RX ORDER — ACETAMINOPHEN 325 MG/1
650 TABLET ORAL
Status: DISCONTINUED | OUTPATIENT
Start: 2018-03-12 | End: 2018-03-20 | Stop reason: HOSPADM

## 2018-03-12 RX ORDER — SODIUM CHLORIDE 0.9 % (FLUSH) 0.9 %
5-10 SYRINGE (ML) INJECTION AS NEEDED
Status: DISCONTINUED | OUTPATIENT
Start: 2018-03-12 | End: 2018-03-20 | Stop reason: HOSPADM

## 2018-03-12 RX ORDER — LEVOTHYROXINE SODIUM 50 UG/1
50 TABLET ORAL
Status: CANCELLED | OUTPATIENT
Start: 2018-03-12

## 2018-03-12 RX ORDER — RISPERIDONE 0.25 MG/1
0.25 TABLET, FILM COATED ORAL 2 TIMES DAILY
Status: CANCELLED | OUTPATIENT
Start: 2018-03-12

## 2018-03-12 RX ORDER — LEVOFLOXACIN 5 MG/ML
250 INJECTION, SOLUTION INTRAVENOUS ONCE
Status: COMPLETED | OUTPATIENT
Start: 2018-03-12 | End: 2018-03-16

## 2018-03-12 RX ORDER — HALOPERIDOL 5 MG/ML
1 INJECTION INTRAMUSCULAR
Status: DISCONTINUED | OUTPATIENT
Start: 2018-03-12 | End: 2018-03-20 | Stop reason: HOSPADM

## 2018-03-12 RX ORDER — METRONIDAZOLE 500 MG/100ML
500 INJECTION, SOLUTION INTRAVENOUS
Status: COMPLETED | OUTPATIENT
Start: 2018-03-12 | End: 2018-03-12

## 2018-03-12 RX ORDER — CIPROFLOXACIN 2 MG/ML
400 INJECTION, SOLUTION INTRAVENOUS
Status: DISCONTINUED | OUTPATIENT
Start: 2018-03-12 | End: 2018-03-12

## 2018-03-12 RX ORDER — ACETAMINOPHEN 650 MG/1
650 SUPPOSITORY RECTAL
Status: DISCONTINUED | OUTPATIENT
Start: 2018-03-12 | End: 2018-03-20 | Stop reason: HOSPADM

## 2018-03-12 RX ORDER — ONDANSETRON 2 MG/ML
4 INJECTION INTRAMUSCULAR; INTRAVENOUS
Status: DISCONTINUED | OUTPATIENT
Start: 2018-03-12 | End: 2018-03-20 | Stop reason: HOSPADM

## 2018-03-12 RX ORDER — MIRTAZAPINE 15 MG/1
15 TABLET, FILM COATED ORAL
Status: CANCELLED | OUTPATIENT
Start: 2018-03-12

## 2018-03-12 RX ORDER — METRONIDAZOLE 500 MG/100ML
500 INJECTION, SOLUTION INTRAVENOUS EVERY 12 HOURS
Status: DISCONTINUED | OUTPATIENT
Start: 2018-03-12 | End: 2018-03-15 | Stop reason: ALTCHOICE

## 2018-03-12 RX ADMIN — SODIUM CHLORIDE 40 MG: 9 INJECTION, SOLUTION INTRAMUSCULAR; INTRAVENOUS; SUBCUTANEOUS at 08:21

## 2018-03-12 RX ADMIN — METRONIDAZOLE 500 MG: 500 INJECTION, SOLUTION INTRAVENOUS at 20:31

## 2018-03-12 RX ADMIN — SODIUM CHLORIDE 100 ML/HR: 900 INJECTION, SOLUTION INTRAVENOUS at 06:14

## 2018-03-12 RX ADMIN — CIPROFLOXACIN 400 MG: 2 INJECTION, SOLUTION INTRAVENOUS at 03:02

## 2018-03-12 RX ADMIN — METRONIDAZOLE 500 MG: 500 INJECTION, SOLUTION INTRAVENOUS at 09:02

## 2018-03-12 RX ADMIN — METRONIDAZOLE 500 MG: 500 INJECTION, SOLUTION INTRAVENOUS at 02:00

## 2018-03-12 RX ADMIN — SODIUM CHLORIDE 1000 ML: 900 INJECTION, SOLUTION INTRAVENOUS at 02:00

## 2018-03-12 RX ADMIN — LEVOFLOXACIN 500 MG: 5 INJECTION, SOLUTION INTRAVENOUS at 04:25

## 2018-03-12 RX ADMIN — LEVOFLOXACIN 250 MG: 5 INJECTION, SOLUTION INTRAVENOUS at 13:25

## 2018-03-12 RX ADMIN — SODIUM CHLORIDE 40 MG: 9 INJECTION, SOLUTION INTRAMUSCULAR; INTRAVENOUS; SUBCUTANEOUS at 20:32

## 2018-03-12 RX ADMIN — Medication 10 ML: at 20:32

## 2018-03-12 NOTE — PROGRESS NOTES
Pharmacy Automatic Renal Dosing Protocol - Antimicrobials    Indication for Antimicrobials: Ischemic colitis, lower urinary tract disease    Current Regimen of Each Antimicrobial:  Levaquin 500 mg IV q 24 hr (Start Date 3/12; Day # 1)  Metronidazole 500 mg IV q 8 hr (start date 3/12; day #1)    Previous Antimicrobial Therapy:  None    Significant Cultures:   3/12-urine-pending  Paralysis, amputations, malnutrition: albumin=3    Labs:  Recent Labs      18   2157   CREA  1.25*   BUN  36*   WBC  20.5*     Temp (24hrs), Av.2 °F (36.8 °C), Min:97.8 °F (36.6 °C), Max:98.9 °F (37.2 °C)      Creatinine Clearance (mL/min) or Dialysis: 22 ml/min  No results found for: PCT    Impression/Plan:   I will change Levaquin dose to 750 mg IV q 48 hr based on renal function and indication and Metronidazole to 500 mg IV q 12 hr per [facility] approved protocol  · Antimicrobial stop date: just started. None entered yet     Pharmacy will follow daily and adjust medications as appropriate for renal function and/or serum levels. Thank you,  Melvina Gilliam, PHARMD          Recommended duration of therapy  [email or web address]/Pharmacy/Clinical%20Companion/[email or web address]. docx    Renal Dosing  [email or web address]/[facility]/Pharmacy/Clinical%20Companion/Renal%[email or web address]. pdf

## 2018-03-12 NOTE — PROGRESS NOTES
General Surgery End of Shift Nursing Note    Bedside shift change report given to [address] (oncoming nurse) by Mana Cheney RN (offgoing nurse). Report included the following information SBAR, Kardex, Intake/Output, MAR and Recent Results. Shift worked:   7a to 3p   Summary of shift:    New admit   Issues for physician to address:   none     Number times ambulated in hallway past shift: 0    Number of times OOB to chair past shift: 0    Pain Management:  Current medication: none  Patient states pain is manageable on current pain medication: YES    GI:    Current diet: Tolerating current diet: YES  Passing flatus: YES  Last Bowel Movement: unknown   Appearance: unknown    Respiratory:    Incentive Spirometer at bedside: YES  Patient instructed on use: YES    Patient Safety:    Falls Score: 5  Bed Alarm On? Yes  Sitter?  No    Jerelyn Heimlich, RN

## 2018-03-12 NOTE — PROGRESS NOTES
Received report from [address] [place] [address] [address] at 11 am. Patient is refusing any kind of care. Tried to do vitals I was only able to get blood pressure. Patient has been sleep most of the day with daughter at beside.

## 2018-03-12 NOTE — PROGRESS NOTES
Pharmacy Note:       Per [facility] approved protocol,  IV levothyroxine will be 75% of the po dose and will be held for 3 days before starting.     CABRERA RomeroD

## 2018-03-12 NOTE — PROGRESS NOTES
Attempted to see patient for OT evaluation, but Hospitalist in room speaking with family. Will defer OT evaluation and follow back tomorrow.

## 2018-03-12 NOTE — H&P
Hospitalist Admission Note    NAME: Juanito Hernandez   :  9/10/1922   MRN:  [id number]     Date/Time:  3/12/2018 3:19 AM    Patient PCP: PROVIDER UNKNOWN  ______________________________________________________________________  Given the patient's current clinical presentation, I have a high level of concern for decompensation if discharged from the emergency department. Complex decision making was performed, which includes reviewing the patient's available past medical records, laboratory results, and x-ray films. My assessment of this patient's clinical condition and my plan of care is as follows. Assessment / Plan:  Blood in stool   SIRs with hypotension and leukocytosis, doubt sepsis  Due to likely ischemic colitis in settings of hypotension for a week and chronic constipation  H/o HTN  Admit  Daughter reported blood pressure is been low for a week now and seem she has had been getting her blood pressure meds  Hold BP meds, permissive HTN. Will not treat BP unless sustain > 150 d/w daughter in settings of ischemic colotis  Unable to r/o infectious etiology so Px treat with IV Levaquin and Flagyl  Daughter not interested in coloscopy so will hold off on GI consult  Monitor H&H  PPI IV    ? SBO on CT  No episode of nausea and vomiting, abdomen non tender  I personally discussed the case with general surgery Dr Geeta Pan, who also believe her problem is colitis rather the bowel obstruction and recommended NPO and if later developed problems suspecting SBP then consult surgery  But considering the situation will be best managed by surgery > advancing diet and all, will ask for formal surgery consult in am and keep NPO till then    Mild FADY and mild hyponatremia  Failure to thrive  Due to dehydration  Pt has not been eating and drinking well for past several months  Albumin 3 which is likely looking better with dehydration, will trend.  If drift down with IVF then consider palliative care/hospice discussion. Daughter seems to be in understanding  IVF and monitor lytes    Lower urinary tract infectious disease   IV abx as above and monitor lytes    Dementia   Hold PO meds considering NPO  PRN IV Haldol for sundowning    Code Status: DDNR  Surrogate Decision Maker: Anna    DVT Prophylaxis: SCDs    Baseline: lives at assisted living      Subjective:   CHIEF COMPLAINT: blood in stool    HISTORY OF PRESENT ILLNESS:     Raven Lujan is a 80 y.o.  female who presents with noticing blood in stool at assisted living facility. As per daughter pt noted to have low blood pressure for past 1 week. Daughter reported chronic constipation. Daughter also reports pt has history of dementia and has not been eating well for past several months. Daughter denies noticing any nausea, vomiting. History is limited due to patient's dementia as pt unable to provide any meaningful information. In ED, pt noted to have CT with Colitis and ? SBO    We were asked to admit for work up and evaluation of the above problems.      Past Medical History:   Diagnosis Date    Diabetes ([address]     Diabetes mellitus ([address]     Diabetic acidosis, type II ([address]     Hypertension     Hypothyroid     Hypothyroidism         Past Surgical History:   Procedure Laterality Date    HX APPENDECTOMY      HX CHOLECYSTECTOMY      HX HYSTERECTOMY         Social History   Substance Use Topics    Smoking status: Never Smoker    Smokeless tobacco: Never Used    Alcohol use No        Family History   Problem Relation Age of Onset    Diabetes Father     Cancer Mother      ovarian      Allergies   Allergen Reactions    Demerol [Meperidine] Unknown (comments)    Morphine Unknown (comments)    Penicillins Unknown (comments)    Demerol [Meperidine] Other (comments)     Unsure of reaction    Morphine Other (comments)     Unsure of reaction    Pcn [Penicillins] Other (comments)     unknown        Prior to Admission medications Medication Sig Start Date End Date Taking? Authorizing Provider   magnesium hydroxide (BURNS MILK OF MAGNESIA) 400 mg/5 mL suspension Take 30 mL by mouth daily as needed for Constipation. Suyapa Hung MD   busPIRone (BUSPAR) 10 mg tablet Take 10 mg by mouth two (2) times a day. Suyapa Hung MD   acetaminophen (TYLENOL) 325 mg tablet Take 2 Tabs by mouth every six (6) hours as needed for Pain. 11/30/17   Kristi Coto MD   traMADol (ULTRAM) 50 mg tablet Take 1 Tab by mouth every six (6) hours as needed for Pain. Max Daily Amount: 200 mg. 10/27/17   Elease Eisenmenger, DO   polyethylene glycol (MIRALAX) 17 gram packet Take 1 Packet by mouth two (2) times a day. 9/7/17   Edgar Heard MD   Mineral Oil drop 2 Drops by Otic route every seven (7) days. Historical Provider   risperiDONE (RISPERDAL) 0.25 mg tablet Take 0.25 mg by mouth two (2) times a day. Indications: mood swings    Historical Provider   CALCIUM CARBONATE (OYSTER SHELL CALCIUM 500 PO) Take 500 mg by mouth daily. Historical Provider   ergocalciferol (VITAMIN D2) 50,000 unit capsule Take 50,000 Units by mouth every seven (7) days. Historical Provider   vitamin e (E GEMS) 1,000 unit capsule Take 1,000 Units by mouth daily. Historical Provider   mirtazapine (REMERON) 15 mg tablet Take 15 mg by mouth nightly. Indications: appetite    Suyapa Hung MD   amLODIPine (NORVASC) 10 mg tablet Take 10 mg by mouth daily. Historical Provider   docusate sodium (COLACE) 100 mg capsule Take 100 mg by mouth daily. Historical Provider   lisinopril (PRINIVIL, ZESTRIL) 10 mg tablet Take 10 mg by mouth daily. Historical Provider   aspirin 81 mg chewable tablet Take 1 Tab by mouth daily. 9/19/12   Susie Martinez MD   levothyroxine (SYNTHROID) 50 mcg tablet Take 50 mcg by mouth Daily (before breakfast). Suyapa Hung MD   cyanocobalamin (VITAMIN B-12) 1,000 mcg tablet Take 1,000 mcg by mouth daily.     Suyapa Hung MD       REVIEW OF SYSTEMS:     I am not able to complete the review of systems because: The patient is intubated and sedated    The patient has altered mental status due to his acute medical problems    The patient has baseline aphasia from prior stroke(s)   y The patient has baseline dementia and is not reliable historian    The patient is in acute medical distress and unable to provide information           Objective:   VITALS:    Visit Vitals    /59 (BP 1 Location: Right arm, BP Patient Position: At rest)    Pulse 70    Temp 97.8 °F (36.6 °C)    Resp 16    SpO2 94%       PHYSICAL EXAM:    General:    Alert, non cooperative, no distress, appears stated age. HEENT: Atraumatic, anicteric sclerae, pink conjunctivae     No oral ulcers, mucosa moist, throat clear, dentition fair  Neck:  Supple, symmetrical,  thyroid: non tender  Lungs:   Clear to auscultation bilaterally. No Wheezing or Rhonchi. No rales. Chest wall:  No tenderness  No Accessory muscle use. Heart:   Regular  rhythm,  No  murmur   No edema  Abdomen:   Soft, non-tender. mild distended. Hypoactive bowel sounds   Extremities: No cyanosis. No clubbing,      Skin turgor normal, Capillary refill normal, Radial dial pulse 2+  Skin:     Not pale. Not Jaundiced  No rashes   Psych:  Exam limited due to pt doesn't cooperate and doesn't follow commands. Not anxious or agitated. Neurologic: Exam limited due to pt doesn't cooperate and doesn't follow commands.     _______________________________________________________________________  Care Plan discussed with:    Comments   Patient     Family  y Daughter at bedside   RN y    Care Manager                    Consultant:  y ED physician and general surgery   _______________________________________________________________________  Expected  Disposition:   Home with Family y   HH/PT/OT/RN    SNF/LTC    MERCEDES    ________________________________________________________________________  TOTAL TIME: 61 Minutes    Critical Care Provided Minutes non procedure based      Comments    y Reviewed previous records   >50% of visit spent in counseling and coordination of care y      y Discussion with family and questions answered    Discussion with Surgeon       ________________________________________________________________________  Signed: Jules Bettencourt MD    Procedures: see electronic medical records for all procedures/Xrays and details which were not copied into this note but were reviewed prior to creation of Plan. LAB DATA REVIEWED:    Recent Results (from the past 24 hour(s))   CBC WITH AUTOMATED DIFF    Collection Time: 03/11/18  9:57 PM   Result Value Ref Range    WBC 20.5 (H) 3.6 - 11.0 K/uL    RBC 4.67 3.80 - 5.20 M/uL    HGB 12.2 11.5 - 16.0 g/dL    HCT 38.0 35.0 - 47.0 %    MCV 81.4 80.0 - 99.0 FL    MCH 26.1 26.0 - 34.0 PG    MCHC 32.1 30.0 - 36.5 g/dL    RDW 17.6 (H) 11.5 - 14.5 %    PLATELET 558 944 - 337 K/uL    MPV 9.4 8.9 - 12.9 FL    NRBC 0.0 0  WBC    ABSOLUTE NRBC 0.00 0.00 - 0.01 K/uL    NEUTROPHILS 86 (H) 32 - 75 %    LYMPHOCYTES 5 (L) 12 - 49 %    MONOCYTES 5 5 - 13 %    EOSINOPHILS 0 0 - 7 %    BASOPHILS 0 0 - 1 %    IMMATURE GRANULOCYTES 4 (H) 0.0 - 0.5 %    ABS. NEUTROPHILS 17.7 (H) 1.8 - 8.0 K/UL    ABS. LYMPHOCYTES 1.0 0.8 - 3.5 K/UL    ABS. MONOCYTES 1.0 0.0 - 1.0 K/UL    ABS. EOSINOPHILS 0.0 0.0 - 0.4 K/UL    ABS. BASOPHILS 0.0 0.0 - 0.1 K/UL    ABS. IMM.  GRANS. 0.8 (H) 0.00 - 0.04 K/UL    DF SMEAR SCANNED      RBC COMMENTS ANISOCYTOSIS  1+        RBC COMMENTS OVALOCYTES  1+       METABOLIC PANEL, COMPREHENSIVE    Collection Time: 03/11/18  9:57 PM   Result Value Ref Range    Sodium 135 (L) 136 - 145 mmol/L    Potassium 4.5 3.5 - 5.1 mmol/L    Chloride 100 97 - 108 mmol/L    CO2 28 21 - 32 mmol/L    Anion gap 7 5 - 15 mmol/L    Glucose 222 (H) 65 - 100 mg/dL    BUN 36 (H) 6 - 20 MG/DL    Creatinine 1.25 (H) 0.55 - 1.02 MG/DL    BUN/Creatinine ratio 29 (H) 12 - 20      GFR est AA 48 (L) >60 ml/min/1.73m2    GFR est non-AA 40 (L) >60 ml/min/1.73m2    Calcium 8.5 8.5 - 10.1 MG/DL    Bilirubin, total 0.5 0.2 - 1.0 MG/DL    ALT (SGPT) 14 12 - 78 U/L    AST (SGOT) 16 15 - 37 U/L    Alk.  phosphatase 161 (H) 45 - 117 U/L    Protein, total 7.2 6.4 - 8.2 g/dL    Albumin 3.0 (L) 3.5 - 5.0 g/dL    Globulin 4.2 (H) 2.0 - 4.0 g/dL    A-G Ratio 0.7 (L) 1.1 - 2.2     OCCULT BLOOD, STOOL    Collection Time: 03/11/18 10:22 PM   Result Value Ref Range    Occult blood, stool POSITIVE (A) NEG     LACTIC ACID    Collection Time: 03/11/18 10:23 PM   Result Value Ref Range    Lactic acid 1.5 0.4 - 2.0 MMOL/L   PTT    Collection Time: 03/11/18 10:23 PM   Result Value Ref Range    aPTT 25.2 22.1 - 32.0 sec    aPTT, therapeutic range     58.0 - 77.0 SECS   PROTHROMBIN TIME + INR    Collection Time: 03/11/18 10:23 PM   Result Value Ref Range    INR 1.1 0.9 - 1.1      Prothrombin time 10.9 9.0 - 11.1 sec   URINALYSIS W/ REFLEX CULTURE    Collection Time: 03/12/18 12:36 AM   Result Value Ref Range    Color DARK YELLOW      Appearance CLOUDY (A) CLEAR      Specific gravity 1.015 1.003 - 1.030      pH (UA) 5.0 5.0 - 8.0      Protein 30 (A) NEG mg/dL    Glucose NEGATIVE  NEG mg/dL    Ketone TRACE (A) NEG mg/dL    Blood TRACE (A) NEG      Urobilinogen 1.0 0.2 - 1.0 EU/dL    Nitrites NEGATIVE  NEG      Leukocyte Esterase LARGE (A) NEG      UA:UC IF INDICATED URINE CULTURE ORDERED (A) CNI      WBC >100 (H) 0 - 4 /hpf    RBC 5-10 0 - 5 /hpf    Epithelial cells FEW FEW /lpf    Bacteria NEGATIVE  NEG /hpf    Hyaline cast 2-5 0 - 5 /lpf   BILIRUBIN, CONFIRM    Collection Time: 03/12/18 12:36 AM   Result Value Ref Range    Bilirubin UA, confirm NEGATIVE  NEG

## 2018-03-12 NOTE — PROGRESS NOTES
Problem: Falls - Risk of  Goal: *Absence of Falls  Document Rose Fall Risk and appropriate interventions in the flowsheet.    Outcome: Progressing Towards Goal  Fall Risk Interventions:  Mobility Interventions: Bed/chair exit alarm    Mentation Interventions: Bed/chair exit alarm    Medication Interventions: Bed/chair exit alarm    Elimination Interventions: Bed/chair exit alarm    History of Falls Interventions: Door open when patient unattended, Bed/chair exit alarm

## 2018-03-12 NOTE — ED PROVIDER NOTES
EMERGENCY DEPARTMENT HISTORY AND PHYSICAL EXAM    Date: 3/11/2018  Patient Name: Abdiaziz Romero    History of Presenting Illness     Chief Complaint   Patient presents with    Rectal Bleeding       History Provided By: Patient and Patient's Daughter    HPI: Abdiaziz Romero is a 80 y.o. female, pmhx Hypothyroid, DM, who presents via EMS from [address] [place] [address] to the ED c/o one episode of dark red blood in her stool earlier today. Daughter at bedside notes the pt has a hx of constipation and receives enemas regularly. She states after 2 enemas today the Nursing staff noticed the pt had dark red blood in her stool. Of note, daughter reports the pt has frequent falls, noting her last was x4 days ago. She states the pt was not evaluated, in since she was not complaining of any pain s/p the fall out of bed. Pt specifically denies any fever, congestion, cough, shortness of breath, chest pain, abdominal pain, nausea, vomiting, diarrhea, dysuria, or urinary frequency. Pt denies any further complaints at this time. PCP: PROVIDER UNKNOWN    PMHx: Significant for Hypothyroid, DM  PSHx: Significant for appendectomy, hysterectomy, cholecystectomy  Social Hx: -tobacco, -EtOH, -Illicit Drugs     There are no other complaints, changes, or physical findings at this time. Current Facility-Administered Medications   Medication Dose Route Frequency Provider Last Rate Last Dose    ciprofloxacin (CIPRO) 400 mg IVPB (premix)  400 mg IntraVENous NOW Hyacinth Guerrero MD        metroNIDAZOLE (FLAGYL) IVPB premix 500 mg  500 mg IntraVENous NOW Hyacinth Guerrero MD        sodium chloride 0.9 % bolus infusion 1,000 mL  1,000 mL IntraVENous NOW Hyacinth Guerrero MD         Current Outpatient Prescriptions   Medication Sig Dispense Refill    magnesium hydroxide (BURNS MILK OF MAGNESIA) 400 mg/5 mL suspension Take 30 mL by mouth daily as needed for Constipation.       busPIRone (BUSPAR) 10 mg tablet Take 10 mg by mouth two (2) times a day.  acetaminophen (TYLENOL) 325 mg tablet Take 2 Tabs by mouth every six (6) hours as needed for Pain. 20 Tab 0    traMADol (ULTRAM) 50 mg tablet Take 1 Tab by mouth every six (6) hours as needed for Pain. Max Daily Amount: 200 mg. 20 Tab 0    polyethylene glycol (MIRALAX) 17 gram packet Take 1 Packet by mouth two (2) times a day. 30 Each 0    Mineral Oil drop 2 Drops by Otic route every seven (7) days.  risperiDONE (RISPERDAL) 0.25 mg tablet Take 0.25 mg by mouth two (2) times a day. Indications: mood swings      CALCIUM CARBONATE (OYSTER SHELL CALCIUM 500 PO) Take 500 mg by mouth daily.  ergocalciferol (VITAMIN D2) 50,000 unit capsule Take 50,000 Units by mouth every seven (7) days.  vitamin e (E GEMS) 1,000 unit capsule Take 1,000 Units by mouth daily.  mirtazapine (REMERON) 15 mg tablet Take 15 mg by mouth nightly. Indications: appetite      amLODIPine (NORVASC) 10 mg tablet Take 10 mg by mouth daily.  docusate sodium (COLACE) 100 mg capsule Take 100 mg by mouth daily.  lisinopril (PRINIVIL, ZESTRIL) 10 mg tablet Take 10 mg by mouth daily.  aspirin 81 mg chewable tablet Take 1 Tab by mouth daily.  levothyroxine (SYNTHROID) 50 mcg tablet Take 50 mcg by mouth Daily (before breakfast).  cyanocobalamin (VITAMIN B-12) 1,000 mcg tablet Take 1,000 mcg by mouth daily.          Past History     Past Medical History:  Past Medical History:   Diagnosis Date    [facility]     Diabetes mellitus ([facility]     Diabetic acidosis, type II ([facility]     Hypertension     Hypothyroid     Hypothyroidism        Past Surgical History:  Past Surgical History:   Procedure Laterality Date    HX APPENDECTOMY      HX CHOLECYSTECTOMY      HX HYSTERECTOMY         Family History:  Family History   Problem Relation Age of Onset    Diabetes Father     Cancer Mother      ovarian        Social History:  Social History   Substance Use Topics    Smoking status: Never Smoker    Smokeless tobacco: Never Used    Alcohol use No       Allergies: Allergies   Allergen Reactions    Demerol [Meperidine] Unknown (comments)    Morphine Unknown (comments)    Penicillins Unknown (comments)    Demerol [Meperidine] Other (comments)     Unsure of reaction    Morphine Other (comments)     Unsure of reaction    Pcn [Penicillins] Other (comments)     unknown         Review of Systems   Review of Systems   Constitutional: Negative. Negative for fever. Eyes: Negative. Respiratory: Negative. Negative for shortness of breath. Cardiovascular: Negative for chest pain. Gastrointestinal: Positive for blood in stool (dark red). Negative for abdominal pain, nausea and vomiting. Endocrine: Negative. Genitourinary: Negative. Negative for difficulty urinating, dysuria and hematuria. Musculoskeletal: Negative. Skin: Negative. Allergic/Immunologic: Negative. Neurological: Negative. Psychiatric/Behavioral: Negative for suicidal ideas. All other systems reviewed and are negative. Physical Exam   Physical Exam   Constitutional: She appears well-developed and well-nourished. No distress. HENT:   Head: Normocephalic and atraumatic. Nose: Nose normal.   Eyes: Conjunctivae and EOM are normal. No scleral icterus. Neck: Normal range of motion. No tracheal deviation present. Cardiovascular: Normal rate, regular rhythm, normal heart sounds and intact distal pulses. Exam reveals no friction rub. No murmur heard. Pulmonary/Chest: Effort normal and breath sounds normal. No stridor. No respiratory distress. She has no wheezes. She has no rales. Abdominal: Soft. Bowel sounds are normal. She exhibits no distension. There is no tenderness. There is no rebound. Genitourinary: Rectal exam shows guaiac positive stool. Rectal exam shows no external hemorrhoid, no fissure, no tenderness and anal tone normal.   Musculoskeletal: Normal range of motion.  She exhibits no tenderness. Neurological: She is alert. She is disoriented. No cranial nerve deficit or sensory deficit. GCS eye subscore is 4. GCS verbal subscore is 4. GCS motor subscore is 6. Skin: Skin is warm and dry. No rash noted. She is not diaphoretic. Psychiatric: She has a normal mood and affect. Her speech is normal and behavior is normal. Judgment and thought content normal. Cognition and memory are normal.   Nursing note and vitals reviewed. Diagnostic Study Results     Labs -     Recent Results (from the past 12 hour(s))   CBC WITH AUTOMATED DIFF    Collection Time: 03/11/18  9:57 PM   Result Value Ref Range    WBC 20.5 (H) 3.6 - 11.0 K/uL    RBC 4.67 3.80 - 5.20 M/uL    HGB 12.2 11.5 - 16.0 g/dL    HCT 38.0 35.0 - 47.0 %    MCV 81.4 80.0 - 99.0 FL    MCH 26.1 26.0 - 34.0 PG    MCHC 32.1 30.0 - 36.5 g/dL    RDW 17.6 (H) 11.5 - 14.5 %    PLATELET 965 666 - 828 K/uL    MPV 9.4 8.9 - 12.9 FL    NRBC 0.0 0  WBC    ABSOLUTE NRBC 0.00 0.00 - 0.01 K/uL    NEUTROPHILS 86 (H) 32 - 75 %    LYMPHOCYTES 5 (L) 12 - 49 %    MONOCYTES 5 5 - 13 %    EOSINOPHILS 0 0 - 7 %    BASOPHILS 0 0 - 1 %    IMMATURE GRANULOCYTES 4 (H) 0.0 - 0.5 %    ABS. NEUTROPHILS 17.7 (H) 1.8 - 8.0 K/UL    ABS. LYMPHOCYTES 1.0 0.8 - 3.5 K/UL    ABS. MONOCYTES 1.0 0.0 - 1.0 K/UL    ABS. EOSINOPHILS 0.0 0.0 - 0.4 K/UL    ABS. BASOPHILS 0.0 0.0 - 0.1 K/UL    ABS. IMM.  GRANS. 0.8 (H) 0.00 - 0.04 K/UL    DF SMEAR SCANNED      RBC COMMENTS ANISOCYTOSIS  1+        RBC COMMENTS OVALOCYTES  1+       METABOLIC PANEL, COMPREHENSIVE    Collection Time: 03/11/18  9:57 PM   Result Value Ref Range    Sodium 135 (L) 136 - 145 mmol/L    Potassium 4.5 3.5 - 5.1 mmol/L    Chloride 100 97 - 108 mmol/L    CO2 28 21 - 32 mmol/L    Anion gap 7 5 - 15 mmol/L    Glucose 222 (H) 65 - 100 mg/dL    BUN 36 (H) 6 - 20 MG/DL    Creatinine 1.25 (H) 0.55 - 1.02 MG/DL    BUN/Creatinine ratio 29 (H) 12 - 20      GFR est AA 48 (L) >60 ml/min/1.73m2    GFR est non-AA 40 (L) >60 ml/min/1.73m2    Calcium 8.5 8.5 - 10.1 MG/DL    Bilirubin, total 0.5 0.2 - 1.0 MG/DL    ALT (SGPT) 14 12 - 78 U/L    AST (SGOT) 16 15 - 37 U/L    Alk. phosphatase 161 (H) 45 - 117 U/L    Protein, total 7.2 6.4 - 8.2 g/dL    Albumin 3.0 (L) 3.5 - 5.0 g/dL    Globulin 4.2 (H) 2.0 - 4.0 g/dL    A-G Ratio 0.7 (L) 1.1 - 2.2     OCCULT BLOOD, STOOL    Collection Time: 03/11/18 10:22 PM   Result Value Ref Range    Occult blood, stool POSITIVE (A) NEG     LACTIC ACID    Collection Time: 03/11/18 10:23 PM   Result Value Ref Range    Lactic acid 1.5 0.4 - 2.0 MMOL/L   PTT    Collection Time: 03/11/18 10:23 PM   Result Value Ref Range    aPTT 25.2 22.1 - 32.0 sec    aPTT, therapeutic range     58.0 - 77.0 SECS   PROTHROMBIN TIME + INR    Collection Time: 03/11/18 10:23 PM   Result Value Ref Range    INR 1.1 0.9 - 1.1      Prothrombin time 10.9 9.0 - 11.1 sec   URINALYSIS W/ REFLEX CULTURE    Collection Time: 03/12/18 12:36 AM   Result Value Ref Range    Color DARK YELLOW      Appearance CLOUDY (A) CLEAR      Specific gravity 1.015 1.003 - 1.030      pH (UA) 5.0 5.0 - 8.0      Protein 30 (A) NEG mg/dL    Glucose NEGATIVE  NEG mg/dL    Ketone TRACE (A) NEG mg/dL    Blood TRACE (A) NEG      Urobilinogen 1.0 0.2 - 1.0 EU/dL    Nitrites NEGATIVE  NEG      Leukocyte Esterase LARGE (A) NEG      UA:UC IF INDICATED URINE CULTURE ORDERED (A) CNI      WBC >100 (H) 0 - 4 /hpf    RBC 5-10 0 - 5 /hpf    Epithelial cells FEW FEW /lpf    Bacteria NEGATIVE  NEG /hpf    Hyaline cast 2-5 0 - 5 /lpf   BILIRUBIN, CONFIRM    Collection Time: 03/12/18 12:36 AM   Result Value Ref Range    Bilirubin UA, confirm NEGATIVE  NEG         Radiologic Studies -   CT ABD PELV W CONT   Final Result        CT Results  (Last 48 hours)               03/11/18 2339  CT ABD PELV W CONT Final result    Impression:  IMPRESSION:       1. Nonspecific infectious or inflammatory colitis. 2. Mild distention of small bowel measures less than 3 cm.  Possible early partial small bowel obstruction with transition in the pelvis due to adhesion. 3. 1.4 cm nonspecific left adrenal nodule. 4. Acute versus subacute L2 partial compression fracture is new since November. Recommend IV hydration to reduce the risk of contrast induced nephrotoxicity. Narrative:  EXAM:  CT ABD PELV W CONT       INDICATION: Rectal bleeding after enemas today. Hysterectomy, appendectomy, and   cholecystectomy. COMPARISON: CT abdomen/pelvis on 11/30/2017       CONTRAST:  100 mL of Isovue-370. TECHNIQUE:    Following the uneventful intravenous administration of contrast, thin axial   images were obtained through the abdomen and pelvis. Coronal and sagittal   reconstructions were generated. Oral contrast was not administered. CT dose   reduction was achieved through use of a standardized protocol tailored for this   examination and automatic exposure control for dose modulation. FINDINGS:    LUNG BASES: No pneumonia. Patchy atelectasis. INCIDENTALLY IMAGED HEART AND MEDIASTINUM: Borderline cardiac size. No effusion. LIVER: No mass. No change. GALLBLADDER: Cholecystectomy. CBD is unchanged. SPLEEN: Normal size. PANCREAS: No mass or ductal dilatation. ADRENALS: 1.4 cm left adrenal nodule is increased and more conspicuous. KIDNEYS: No mass, calculus, or hydronephrosis. STOMACH: Partial distention. SMALL BOWEL: Prominent small bowel distention measures up to 2.6 cm. There is   transition in the anterior, inferior pelvis. COLON: Mural thickening of the descending colon and rectum. APPENDIX: Resected. PERITONEUM: No ascites or pneumoperitoneum. RETROPERITONEUM: Aorta is atherosclerotic without aneurysm. 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 Superior mesenteric artery is   patent. REPRODUCTIVE ORGANS: Hysterectomy. URINARY BLADDER: No mass or calculus.    BONES: New partial compression fracture of the L2 vertebral body is acute or subacute. Unchanged partial compression fractures of the L3 and L4 vertebral   bodies. ADDITIONAL COMMENTS: N/A               CXR Results  (Last 48 hours)    None            Medical Decision Making   I am the first provider for this patient. I reviewed the vital signs, available nursing notes, past medical history, past surgical history, family history and social history. Vital Signs-Reviewed the patient's vital signs. Patient Vitals for the past 12 hrs:   Temp Pulse Resp BP SpO2   03/11/18 2200 - - - 104/52 -   03/11/18 2130 - - - (!) 89/47 -   03/11/18 2109 - - - 92/45 -   03/11/18 2104 - - - 94/48 -   03/11/18 2101 97.8 °F (36.6 °C) 77 16 - 95 %       Pulse Oximetry Analysis - 95% on RA    Cardiac Monitor:   Rate: 77 bpm  Rhythm: Normal Sinus Rhythm      Records Reviewed: Nursing Notes, Old Medical Records, Previous electrocardiograms, Ambulance Run Sheet, Previous Radiology Studies and Previous Laboratory Studies    Provider Notes (Medical Decision Making):     DDX:  Uti, diverticulosis/litis, hemorrhoids, anemia    Plan:  Labs, rectal, ua, ivf, ct scan    Impression:  Colitis, uti    ED Course:   Initial assessment performed. The patients presenting problems have been discussed, and they are in agreement with the care plan formulated and outlined with them. I have encouraged them to ask questions as they arise throughout their visit. I reviewed our electronic medical record system for any past medical records that were available that may contribute to the patients current condition, the nursing notes and and vital signs from today's visit    Nursing notes will be reviewed as they become available in realtime while the pt has been in the ED. Tianna Ibarra MD    I personally reviewed pt's imaging. Official read by radiology listed below. Tianna Ibarra MD    Procedure Note - Rectal Exam:   10:12 PM  Performed by: Tianna Ibarra MD  Chaperoned by: Owen Ceballos RN   Rectal exam performed. Brown stool was collected. Stool was collected and sent to the lab for Hemoccult testing. Other findings: no large external hemorrhoids   The procedure took 1-15 minutes, and pt tolerated well. PROGRESS NOTE:  1:00 AM  Pt reevaluated. Reviewed pt's resulted labs / imaging, noting WBC of 20.5, positive Occult, and CT showing Nonspecific infectious or inflammatory colitis, Mild distention of small bowel measures less than 3 cm, Possible early partial small bowel obstruction with transition in the pelvis due to adhesion, and 1.4 cm nonspecific left adrenal nodule. Written by Nessa Sanchez, HERNESTO Scribe, as dictated by Aida Narayanan MD    PROGRESS NOTE:  1:30 AM  Pt reevaluated. Pt updated on resulted CT scan / labs and current plan of care, including admission. Pt expressed understanding of current plan. Written by Nessa Sanchez, HERNESTO Scribe, as dictated by Aida Narayanan MD.     CONSULT NOTE:   1:35 AM  Aida Narayanan MD spoke with Dr. Lino Asencio,   Specialty: Hospitalist  Discussed pt's hx, disposition, and available diagnostic and imaging results. Reviewed care plans. Consultant will evaluate pt for admission. Written by Nessa Sanchez ED Scribe, as dictated by Aida Narayanan MD.        Critical Care Time:     none    PLAN:  1. Admission to the Hospital    Disposition:    Admit Note:  1:34 AM  Pt is being admitted by Dr. Lino Asencio. The results of their tests and reason(s) for their admission have been discussed with pt and/or available family. They convey agreement and understanding for the need to be admitted and for admission diagnosis. Diagnosis     Clinical Impression:   1. Colitis, acute    2. Urinary tract infection without hematuria, site unspecified    3. Lumbar compression fracture, closed, initial encounter [facility])        Attestations:     This note is prepared by Nessa Sanchez, acting as Scribe for MD Aida Disla MD : The scribe's documentation has been prepared under my direction and personally reviewed by me in its entirety. I confirm that the note above accurately reflects all work, treatment, procedures, and medical decision making performed by me. This note will not be viewable in [address]

## 2018-03-12 NOTE — CONSULTS
Surgery Consult    Subjective: Raven Rogers is a 80 y.o. female admitted with bleeding per rectum. She has had poor po intake last month. She is felt to have ischemic colitis. She is also being treated for UTI. Initial CT raised possibility of small bowel obstruction. Patient denies abdominal pain now. She does not have dyspnea or anginal chest pain. I reviewed history with daughter.     Past Medical History:   Diagnosis Date    [facility] ([facility]     Diabetes mellitus ([facility]     Diabetic acidosis, type II ([facility] [facility]     Hypertension     Hypothyroid     Hypothyroidism      Past Surgical History:   Procedure Laterality Date    HX APPENDECTOMY      HX CHOLECYSTECTOMY      HX HYSTERECTOMY        Family History   Problem Relation Age of Onset    Diabetes Father     Cancer Mother      ovarian      Social History     Social History    Marital status:      Spouse name: N/A    Number of children: N/A    Years of education: N/A     Social History Main Topics    Smoking status: Never Smoker    Smokeless tobacco: Never Used    Alcohol use No    Drug use: No    Sexual activity: Not on file     Other Topics Concern    Not on file     Social History Narrative    ** Merged History Encounter **           Current Facility-Administered Medications   Medication Dose Route Frequency Provider Last Rate Last Dose    0.9% sodium chloride infusion  100 mL/hr IntraVENous CONTINUOUS Carlos Eduardo Hernandez  mL/hr at 03/12/18 0614 100 mL/hr at 03/12/18 0614    haloperidol lactate (HALDOL) injection 1 mg  1 mg IntraVENous Q6H PRN Carlos Eduardo Hernandez MD        sodium chloride (NS) flush 5-10 mL  5-10 mL IntraVENous Q8H Carlos Eduardo Hernandez MD        sodium chloride (NS) flush 5-10 mL  5-10 mL IntraVENous PRN Veronica Agarwal MD        acetaminophen (TYLENOL) tablet 650 mg  650 mg Oral Q6H PRN Carlos Eduardo Hernandez MD        ondansetron (ZOFRAN) injection 4 mg  4 mg IntraVENous Q4H PRN Veronica Agarwal MD        acetaminophen (TYLENOL) suppository 650 mg  650 mg Rectal Q6H PRN Tobi Steele MD        pantoprazole (PROTONIX) 40 mg in sodium chloride 10 mL injection  40 mg IntraVENous Q12H Carlos Eduardo Hernandez MD   40 mg at 18 0821    [START ON 3/15/2018] levothyroxine (SYNTHROID) injection 37.5 mcg  37.5 mcg IntraVENous Q24H Tobi Steele MD        [START ON 3/14/2018] levoFLOXacin (LEVAQUIN) 750 mg in D5W IVPB  750 mg IntraVENous Q48H Carlos Eduardo Hernandez MD        metroNIDAZOLE (FLAGYL) IVPB premix 500 mg  500 mg IntraVENous Q12H Tobi Steele MD            Allergies   Allergen Reactions    Demerol [Meperidine] Unknown (comments)    Morphine Unknown (comments)    Penicillins Unknown (comments)    Demerol [Meperidine] Other (comments)     Unsure of reaction    Morphine Other (comments)     Unsure of reaction    Pcn [Penicillins] Other (comments)     unknown       Review of Systems:  Pertinent items are noted in the History of Present Illness. Objective:      Patient Vitals for the past 8 hrs:   BP Temp Pulse Resp SpO2   18 1508 117/52 - - 18 -   18 1334 97/40 97.8 °F (36.6 °C) 66 18 96 %       Temp (24hrs), Av.1 °F (36.7 °C), Min:97.8 °F (36.6 °C), Max:98.9 °F (37.2 °C)      Physical Exam:  The patient is elderly woman, appears weak but alert when aroused. Abdomen is soft, not distended. Non tender. Bowel sounds hypoactive. CT images reviewed. Assessment:     Hospital Problems  Date Reviewed: 3/12/2018          Codes Class Noted POA    Colitis ICD-10-CM: K52.9  ICD-9-CM: 558.9  3/12/2018 Unknown        Hypotension ICD-10-CM: I95.9  ICD-9-CM: 458.9  3/12/2018 Unknown        Lower urinary tract infectious disease ICD-10-CM: N39.0  ICD-9-CM: 599.0  2016 Yes        Dementia ICD-10-CM: F03.90  ICD-9-CM: 294.20  2016 Yes        Hyponatremia ICD-10-CM: E87.1  ICD-9-CM: 276.1  9/15/2012 Yes            Ileus versus SBO. I would favor ileus. Plan:     Continue conservative management.   I would not favor surgery even if small bowel obstruction were confirmed. The patient's daughter prefers conservative management of her situation.     Signed By: Rosalva Lewis MD     March 12, 2018

## 2018-03-12 NOTE — ED NOTES
TRANSFER - OUT REPORT:    Verbal report given to jaqueline MACHADO on Raven Rogers  being transferred to [facility] for urgent transfer       Report consisted of patients Situation, Background, Assessment and   Recommendations(SBAR). Information from the following report(s) SBAR, ED Summary, [facility] and Recent Results was reviewed with the receiving nurse. Opportunity for questions and clarification was provided.       Patient transported with:   [employer]

## 2018-03-12 NOTE — PROGRESS NOTES
Physical therapy note:   Orders received, chart reviewed. Pt asleep upon arrival, but easy to rouse. Pt oriented to person only and adamantly refused OOB activity. Attempted to reorient pt and explain role of PT, however pt clutching onto covers and became agitated with therapist's attempts to pull covers down to attempt sitting on EOB. Will continue to follow for PT evaluation. Janett Reid, PT, DPT

## 2018-03-12 NOTE — ED TRIAGE NOTES
Pt arrives via EMS. Report from [facility] states she has regular enemas and was given 2 today. At 1830 pt experienced dark red and brown sttol. Pt has no complaints at this time.   Denies pain

## 2018-03-12 NOTE — PROGRESS NOTES
Hospitalist Progress Note    NAME: oWody Cueva   :  9/10/1922   MRN:  [id number]     Admit date: 3/11/2018    Today's date: 18    PCP: Dina Sharp M.D. Cell [phone]      Assessment / Plan:    ischemic vs infectious colitis POA sigmoid and descending colon  ? Early SBO vs ileus POA no vomiting at home or in hospital  Leukocytosis POA no formal SIRS criteria  Hypotension POA  Lower GI bleed POA ? Related to ischemic colitis  chronic constipation POA  Spoke with daughter at bedside at length  No NG tubes or surgery, she wants to conservative Rx, Fluids, antibiotics      We talked about consider hospice going forward, if she improves or not  Unclear if actually has SBO, not vomiting  NPO for now, repeat KUB if she will let us(refused my exam earlier)  Hold BP meds, permissive HTN. IV Levaquin and Flagyl  H+H, no further bleeding    Acute kidney injury POA   Mild hyponatremia POA  Failure to thrive  Pt has not been eating and drinking well for past several months  IVF and monitor lytes     UTI POA  IV levaquin     Dementia   Hold PO meds   PRN IV Haldol for      Code Status: DDNR  Surrogate Decision Maker: Anna     DVT Prophylaxis: SCDs        Subjective:     Chief Complaint / Reason for Physician Visit  \"Do not touch my stomach anymore\". Discussed with RN events overnight.    Sleeping when I came in, daughter in room  Denied complaints  When I examined her abdomen, she abruptly told me to stop, which I did  She told \"me to go to bed\" and would not interact any more  No further bleeding, no  vomiting per daughter, pt denied pain  Review of Systems:  Symptom Y/N Comments  Symptom Y/N Comments   Fever/Chills    Chest Pain n    Poor Appetite    Edema     Cough n   Abdominal Pain n    Sputum    Joint Pain     SOB/LORENZ n   Headache     Nausea/vomit n   Tolerating PT/OT     Diarrhea n   Tolerating Diet n NPO   Constipation    Other       Could NOT obtain due to:      Objective:     VITALS:   Last 24hrs VS reviewed since prior progress note. Most recent are:  Patient Vitals for the past 24 hrs:   Temp Pulse Resp BP SpO2   03/12/18 1508 - - 18 117/52 -   03/12/18 1334 97.8 °F (36.6 °C) 66 18 97/40 96 %   03/12/18 0723 97.8 °F (36.6 °C) 66 18 97/40 96 %   03/12/18 0524 98.9 °F (37.2 °C) 74 17 107/61 95 %   03/12/18 0200 - 70 - 109/59 94 %   03/11/18 2200 - - - 104/52 -   03/11/18 2130 - - - (!) 89/47 -   03/11/18 2109 - - - 92/45 -   03/11/18 2104 - - - 94/48 -   03/11/18 2101 97.8 °F (36.6 °C) 77 16 - 95 %       Intake/Output Summary (Last 24 hours) at 03/12/18 1754  Last data filed at 03/12/18 0902   Gross per 24 hour   Intake           268.33 ml   Output                0 ml   Net           268.33 ml        Wt Readings from Last 12 Encounters:   11/30/17 52 kg (114 lb 10.2 oz)   10/27/17 52 kg (114 lb 10.2 oz)   09/03/17 51.7 kg (114 lb)   05/28/17 52.1 kg (114 lb 13.8 oz)   01/06/17 52.2 kg (115 lb)   04/11/16 51.5 kg (113 lb 8.6 oz)   01/25/16 56.7 kg (125 lb)   12/20/15 45.4 kg (100 lb 1.4 oz)   09/22/12 45.4 kg (100 lb 1 oz)   09/19/12 48.1 kg (106 lb)   03/04/12 54.4 kg (120 lb)       PHYSICAL EXAM:  General: WD, WN. Sleepy but arousable, no acute distress    GI:  Soft, she cut my exam short while trying to palpate her abdomen  LN:  No cervical or inguinal BRIAN  Neurologic:  Alert, confused, normal speech, seemed to be moving her limbs well  Psych:   Poor insight. Not anxious nor agitated  Skin:  No rashes.   No jaundice    Reviewed most current lab test results and cultures  YES  Reviewed most current radiology test results   YES  Review and summation of old records today    NO  Reviewed patient's current orders and MAR    YES  PMH/SH reviewed - no change compared to H&P  ________________________________________________________________________  Care Plan discussed with:    Comments   Patient y    Family  y    RN y    Care Manager     Consultant Multidiciplinary team rounds were held today with , nursing, pharmacist and clinical coordinator. Patient's plan of care was discussed; medications were reviewed and discharge planning was addressed. ________________________________________________________________________  Total NON critical care TIME:  20   Minutes    Total CRITICAL CARE TIME Spent:   Minutes non procedure based      Comments   >50% of visit spent in counseling and coordination of care     ________________________________________________________________________  Lauren De La Torre MD     Procedures: see electronic medical records for all procedures/Xrays and details which were not copied into this note but were reviewed prior to creation of Plan. LABS:  I reviewed today's most current labs and imaging studies.   Pertinent labs include:  Recent Labs      03/11/18 2157   WBC  20.5*   HGB  12.2   HCT  38.0   PLT  261     Recent Labs      03/11/18 2223  03/11/18 2157   NA   --   135*   K   --   4.5   CL   --   100   CO2   --   28   GLU   --   222*   BUN   --   36*   CREA   --   1.25*   CA   --   8.5   ALB   --   3.0*   TBILI   --   0.5   SGOT   --   16   ALT   --   14   INR  1.1   --

## 2018-03-13 ENCOUNTER — APPOINTMENT (OUTPATIENT)
Dept: GENERAL RADIOLOGY | Age: 83
DRG: 394 | End: 2018-03-13
Attending: INTERNAL MEDICINE
Payer: MEDICARE

## 2018-03-13 LAB
ALBUMIN SERPL-MCNC: 2.1 G/DL (ref 3.5–5)
ALBUMIN/GLOB SERPL: 0.7 {RATIO} (ref 1.1–2.2)
ALP SERPL-CCNC: 101 U/L (ref 45–117)
ALT SERPL-CCNC: 9 U/L (ref 12–78)
ANION GAP SERPL CALC-SCNC: 7 MMOL/L (ref 5–15)
AST SERPL-CCNC: 14 U/L (ref 15–37)
BACTERIA SPEC CULT: NORMAL
BASOPHILS # BLD: 0 K/UL (ref 0–0.1)
BASOPHILS NFR BLD: 1 % (ref 0–1)
BILIRUB SERPL-MCNC: 0.4 MG/DL (ref 0.2–1)
BUN SERPL-MCNC: 24 MG/DL (ref 6–20)
BUN/CREAT SERPL: 31 (ref 12–20)
CALCIUM SERPL-MCNC: 7.4 MG/DL (ref 8.5–10.1)
CC UR VC: NORMAL
CHLORIDE SERPL-SCNC: 109 MMOL/L (ref 97–108)
CO2 SERPL-SCNC: 24 MMOL/L (ref 21–32)
CREAT SERPL-MCNC: 0.78 MG/DL (ref 0.55–1.02)
DIFFERENTIAL METHOD BLD: ABNORMAL
EOSINOPHIL # BLD: 0.2 K/UL (ref 0–0.4)
EOSINOPHIL NFR BLD: 2 % (ref 0–7)
ERYTHROCYTE [DISTWIDTH] IN BLOOD BY AUTOMATED COUNT: 17.9 % (ref 11.5–14.5)
GLOBULIN SER CALC-MCNC: 3.1 G/DL (ref 2–4)
GLUCOSE SERPL-MCNC: 63 MG/DL (ref 65–100)
HCT VFR BLD AUTO: 29.9 % (ref 35–47)
HGB BLD-MCNC: 9.2 G/DL (ref 11.5–16)
IMM GRANULOCYTES # BLD: 0.1 K/UL (ref 0–0.04)
IMM GRANULOCYTES NFR BLD AUTO: 1 % (ref 0–0.5)
LYMPHOCYTES # BLD: 2.3 K/UL (ref 0.8–3.5)
LYMPHOCYTES NFR BLD: 29 % (ref 12–49)
MCH RBC QN AUTO: 26 PG (ref 26–34)
MCHC RBC AUTO-ENTMCNC: 30.8 G/DL (ref 30–36.5)
MCV RBC AUTO: 84.5 FL (ref 80–99)
MONOCYTES # BLD: 0.9 K/UL (ref 0–1)
MONOCYTES NFR BLD: 12 % (ref 5–13)
NEUTS SEG # BLD: 4.3 K/UL (ref 1.8–8)
NEUTS SEG NFR BLD: 55 % (ref 32–75)
NRBC # BLD: 0 K/UL (ref 0–0.01)
NRBC BLD-RTO: 0 PER 100 WBC
PLATELET # BLD AUTO: 216 K/UL (ref 150–400)
PMV BLD AUTO: 9.7 FL (ref 8.9–12.9)
POTASSIUM SERPL-SCNC: 4.1 MMOL/L (ref 3.5–5.1)
PROT SERPL-MCNC: 5.2 G/DL (ref 6.4–8.2)
RBC # BLD AUTO: 3.54 M/UL (ref 3.8–5.2)
SERVICE CMNT-IMP: NORMAL
SODIUM SERPL-SCNC: 140 MMOL/L (ref 136–145)
WBC # BLD AUTO: 7.9 K/UL (ref 3.6–11)

## 2018-03-13 PROCEDURE — 74011000250 HC RX REV CODE- 250: Performed by: INTERNAL MEDICINE

## 2018-03-13 PROCEDURE — 77030038269 HC DRN EXT URIN PURWCK BARD -A

## 2018-03-13 PROCEDURE — 74019 RADEX ABDOMEN 2 VIEWS: CPT

## 2018-03-13 PROCEDURE — 65270000029 HC RM PRIVATE

## 2018-03-13 PROCEDURE — 85025 COMPLETE CBC W/AUTO DIFF WBC: CPT | Performed by: INTERNAL MEDICINE

## 2018-03-13 PROCEDURE — 36415 COLL VENOUS BLD VENIPUNCTURE: CPT | Performed by: INTERNAL MEDICINE

## 2018-03-13 PROCEDURE — 80053 COMPREHEN METABOLIC PANEL: CPT | Performed by: INTERNAL MEDICINE

## 2018-03-13 PROCEDURE — 74011250636 HC RX REV CODE- 250/636: Performed by: INTERNAL MEDICINE

## 2018-03-13 PROCEDURE — C9113 INJ PANTOPRAZOLE SODIUM, VIA: HCPCS | Performed by: INTERNAL MEDICINE

## 2018-03-13 RX ADMIN — SODIUM CHLORIDE 40 MG: 9 INJECTION, SOLUTION INTRAMUSCULAR; INTRAVENOUS; SUBCUTANEOUS at 23:40

## 2018-03-13 RX ADMIN — Medication 10 ML: at 05:51

## 2018-03-13 RX ADMIN — SODIUM CHLORIDE 40 MG: 9 INJECTION, SOLUTION INTRAMUSCULAR; INTRAVENOUS; SUBCUTANEOUS at 12:13

## 2018-03-13 RX ADMIN — Medication 10 ML: at 14:00

## 2018-03-13 RX ADMIN — HALOPERIDOL LACTATE 1 MG: 5 INJECTION, SOLUTION INTRAMUSCULAR at 23:40

## 2018-03-13 RX ADMIN — METRONIDAZOLE 500 MG: 500 INJECTION, SOLUTION INTRAVENOUS at 12:13

## 2018-03-13 RX ADMIN — METRONIDAZOLE 500 MG: 500 INJECTION, SOLUTION INTRAVENOUS at 23:41

## 2018-03-13 NOTE — PROGRESS NOTES
Surgery    Denies abdominal pain or nausea. Afebrile, VSS. Alert, no distress, answers appropriately. Abdomen soft, non tender, active bowel sounds. FUA this AM - no reading yet. Images show some gas in large and small bowel without significant distension. Imp - Seems clinically improved. Plan - Allow sips of clear liquid po. I have ordered.     Parish Pittman MD

## 2018-03-13 NOTE — PROGRESS NOTES
Physical Therapy    Patient back from test.  Went in with OT to see pateint for PT evaluation. She was somewhat agitated and kept telling up to just go home. \"Please leave me alone. \"  She is dependent for ADLs, but does feed herself. In talking with daughter, the patient had a fall resulting in a right ankle fracture in August of 2017. She is a patient of Dr. Cal Urrutia. She didn't have surgery on the ankle. She just started therapy a couple of weeks ago at [address]. She has what the daughter was calling an [place] Boot\" that she needs to wear if she ambulates. She doesn't have it here. Daughter is willing to bring it in. Patient has adamantly declined PT two days in a row now. Will try again tomorrow. If not successful, will complete her PT order. She may do better continuing with PT services once at home again in her familiar environment.     Martin Yates, PT  Time Calculation: 20 minutes (no charge)

## 2018-03-13 NOTE — PROGRESS NOTES
Hospitalist Progress Note    NAME: Zeeshan Leal   :  9/10/1922   MRN:  [id number]     Admit date: 3/11/2018    Today's date: 18    PCP: PROVIDER UNKNOWN        Assessment / Plan:  *attempting repeat imaging, pt refusing    Ischemic vs infectious colitis POA sigmoid and descending colon  ? Early SBO vs ileus POA no vomiting at home or in hospital  Leukocytosis POA no formal SIRS criteria  Hypotension POA  Lower GI bleed POA ? Related to ischemic colitis  chronic constipation POA  No NG tubes or surgery, she wants to conservative Rx, Fluids, antibiotics  Hospice consulted  Unclear if actually has SBO, not vomiting  NPO for now, repeat KUB if she will let us(refused my exam earlier)  Hold BP meds, permissive HTN. IV Levaquin and Flagyl  H+H, no further bleeding    Acute kidney injury POA   Mild hyponatremia POA  Failure to thrive  Pt has not been eating and drinking well for past several months  IVF and monitor lytes     UTI POA  IV levaquin  (UA neg, prob after abx given)     Dementia   Hold PO meds   PRN IV Haldol for      Code Status: DDNR  Surrogate Decision Maker: Anna   Discussed with pts dtr, she is willing to meet with hospice about overall goals of care,poss home with hospice 3/13  DVT Prophylaxis: SCDs        Subjective:     Chief Complaint / Reason for Physician Visit  Pt demented, allowed limited exam only    Review of Systems:  Symptom Y/N Comments  Symptom Y/N Comments   Fever/Chills    Chest Pain n    Poor Appetite    Edema     Cough n   Abdominal Pain n    Sputum    Joint Pain     SOB/LORENZ n   Headache     Nausea/vomit n   Tolerating PT/OT     Diarrhea n   Tolerating Diet n NPO   Constipation    Other       Could NOT obtain due to:      Objective:     VITALS:   Last 24hrs VS reviewed since prior progress note.  Most recent are:  Patient Vitals for the past 24 hrs:   Temp Pulse Resp BP SpO2   18 1059 97.9 °F (36.6 °C) 69 19 141/62 93 %   18 0800 98.4 °F (36.9 °C) 67 16 123/75 91 %   03/13/18 0415 97.8 °F (36.6 °C) 66 18 118/56 93 %   03/12/18 2354 98 °F (36.7 °C) 68 18 133/55 93 %   03/12/18 2035 98.3 °F (36.8 °C) 68 18 120/59 92 %       Intake/Output Summary (Last 24 hours) at 03/13/18 1714  Last data filed at 03/13/18 1240   Gross per 24 hour   Intake                0 ml   Output              425 ml   Net             -425 ml        Wt Readings from Last 12 Encounters:   11/30/17 52 kg (114 lb 10.2 oz)   10/27/17 52 kg (114 lb 10.2 oz)   09/03/17 51.7 kg (114 lb)   05/28/17 52.1 kg (114 lb 13.8 oz)   01/06/17 52.2 kg (115 lb)   04/11/16 51.5 kg (113 lb 8.6 oz)   01/25/16 56.7 kg (125 lb)   12/20/15 45.4 kg (100 lb 1.4 oz)   09/22/12 45.4 kg (100 lb 1 oz)   09/19/12 48.1 kg (106 lb)   03/04/12 54.4 kg (120 lb)       PHYSICAL EXAM:  General: WD, WN. Sleepy but arousable, no acute distress    GI:  Soft, she cut my exam short while trying to palpate her abdomen  LN:  No cervical or inguinal BRIAN  Neurologic:  Alert, confused, normal speech, seemed to be moving her limbs well  Psych:   Poor insight. Not anxious nor agitated  Skin:  No rashes. No jaundice  Abd:   Soft, non tender (unreliable as pt is moving and complaining about being examined)    Reviewed most current lab test results and cultures  YES  Reviewed most current radiology test results   YES  Review and summation of old records today    NO  Reviewed patient's current orders and MAR    YES  PMH/SH reviewed - no change compared to H&P  ________________________________________________________________________  Care Plan discussed with:    Comments   Patient y    Family  y    RN y    Care Manager     Consultant                        Multidiciplinary team rounds were held today with , nursing, pharmacist and clinical coordinator. Patient's plan of care was discussed; medications were reviewed and discharge planning was addressed. ________________________________________________________________________  Total NON critical care TIME:  20   Minutes    Total CRITICAL CARE TIME Spent:   Minutes non procedure based      Comments   >50% of visit spent in counseling and coordination of care     ________________________________________________________________________  Chika Luis MD     Procedures: see electronic medical records for all procedures/Xrays and details which were not copied into this note but were reviewed prior to creation of Plan. LABS:  I reviewed today's most current labs and imaging studies.   Pertinent labs include:  Recent Labs      03/13/18 0239 03/11/18 2157   WBC  7.9  20.5*   HGB  9.2*  12.2   HCT  29.9*  38.0   PLT  216  261     Recent Labs      03/13/18 0239 03/11/18 2223 03/11/18 2157   NA  140   --   135*   K  4.1   --   4.5   CL  109*   --   100   CO2  24   --   28   GLU  63*   --   222*   BUN  24*   --   36*   CREA  0.78   --   1.25*   CA  7.4*   --   8.5   ALB  2.1*   --   3.0*   TBILI  0.4   --   0.5   SGOT  14*   --   16   ALT  9*   --   14   INR   --   1.1   --

## 2018-03-13 NOTE — PROGRESS NOTES
Patient is non compliant and combative at times. Went down for an xray and it could not be done because patient refused. Dr. Diane Kaye is aware.   Could not get 3 o clock vitals  Patient got out of bed and had a BM on the floor

## 2018-03-13 NOTE — PROGRESS NOTES
Occupational Therapy  Attempted to see patient again this morning for OT evaluation, but she was off the floor for testing. Will defer for now but continue to follow. 11:48 am  Patient was back from testing. Attempted again to see patient for OT evaluation. She is agitated and asking to be left alone and kept telling therapist to \"Just go home! \"  Daughter present and indicates that she is dependent for all basic self-care tasks at home. She is able to feed herself, but she is currently NPO, so unsure if she can now, but her daughter does not feel like that will be a problem. Her daughter indicates that she is supposed to wear an [place] when she is walking/out of bed due to an ankle fracture back in August.  It is not here, but she will bring it tomorrow. Since she is at her ADL baseline, will complete the OT order and sign off.

## 2018-03-13 NOTE — PROGRESS NOTES
Physical Therapy    Chart reviewed and attempting to see patient for PT blaire, but she is currently off the floor at x-ray. Will check back later today.     Nehemias Duarte, PT

## 2018-03-14 LAB
ANION GAP SERPL CALC-SCNC: 9 MMOL/L (ref 5–15)
BASOPHILS # BLD: 0.1 K/UL (ref 0–0.1)
BASOPHILS NFR BLD: 1 % (ref 0–1)
BUN SERPL-MCNC: 19 MG/DL (ref 6–20)
BUN/CREAT SERPL: 26 (ref 12–20)
CALCIUM SERPL-MCNC: 7.8 MG/DL (ref 8.5–10.1)
CHLORIDE SERPL-SCNC: 110 MMOL/L (ref 97–108)
CO2 SERPL-SCNC: 21 MMOL/L (ref 21–32)
CREAT SERPL-MCNC: 0.72 MG/DL (ref 0.55–1.02)
CRP SERPL-MCNC: 4.06 MG/DL (ref 0–0.6)
DIFFERENTIAL METHOD BLD: ABNORMAL
EOSINOPHIL # BLD: 0.1 K/UL (ref 0–0.4)
EOSINOPHIL NFR BLD: 2 % (ref 0–7)
ERYTHROCYTE [DISTWIDTH] IN BLOOD BY AUTOMATED COUNT: 18.4 % (ref 11.5–14.5)
GLUCOSE BLD STRIP.AUTO-MCNC: 103 MG/DL (ref 65–100)
GLUCOSE BLD STRIP.AUTO-MCNC: 104 MG/DL (ref 65–100)
GLUCOSE BLD STRIP.AUTO-MCNC: 132 MG/DL (ref 65–100)
GLUCOSE BLD STRIP.AUTO-MCNC: 180 MG/DL (ref 65–100)
GLUCOSE BLD STRIP.AUTO-MCNC: 70 MG/DL (ref 65–100)
GLUCOSE SERPL-MCNC: 41 MG/DL (ref 65–100)
HCT VFR BLD AUTO: 34.7 % (ref 35–47)
HGB BLD-MCNC: 10.7 G/DL (ref 11.5–16)
IMM GRANULOCYTES # BLD: 0.3 K/UL (ref 0–0.04)
IMM GRANULOCYTES NFR BLD AUTO: 4 % (ref 0–0.5)
LYMPHOCYTES # BLD: 2.6 K/UL (ref 0.8–3.5)
LYMPHOCYTES NFR BLD: 37 % (ref 12–49)
MCH RBC QN AUTO: 26 PG (ref 26–34)
MCHC RBC AUTO-ENTMCNC: 30.8 G/DL (ref 30–36.5)
MCV RBC AUTO: 84.4 FL (ref 80–99)
MONOCYTES # BLD: 0.8 K/UL (ref 0–1)
MONOCYTES NFR BLD: 12 % (ref 5–13)
NEUTS SEG # BLD: 3.1 K/UL (ref 1.8–8)
NEUTS SEG NFR BLD: 44 % (ref 32–75)
NRBC # BLD: 0 K/UL (ref 0–0.01)
NRBC BLD-RTO: 0 PER 100 WBC
PLATELET # BLD AUTO: 245 K/UL (ref 150–400)
PMV BLD AUTO: 9.8 FL (ref 8.9–12.9)
POTASSIUM SERPL-SCNC: 4.5 MMOL/L (ref 3.5–5.1)
RBC # BLD AUTO: 4.11 M/UL (ref 3.8–5.2)
RBC MORPH BLD: ABNORMAL
SERVICE CMNT-IMP: ABNORMAL
SERVICE CMNT-IMP: NORMAL
SODIUM SERPL-SCNC: 140 MMOL/L (ref 136–145)
WBC # BLD AUTO: 7 K/UL (ref 3.6–11)

## 2018-03-14 PROCEDURE — 74011250636 HC RX REV CODE- 250/636: Performed by: INTERNAL MEDICINE

## 2018-03-14 PROCEDURE — G8978 MOBILITY CURRENT STATUS: HCPCS | Performed by: PHYSICAL THERAPIST

## 2018-03-14 PROCEDURE — C9113 INJ PANTOPRAZOLE SODIUM, VIA: HCPCS | Performed by: INTERNAL MEDICINE

## 2018-03-14 PROCEDURE — 74011000250 HC RX REV CODE- 250: Performed by: INTERNAL MEDICINE

## 2018-03-14 PROCEDURE — 97530 THERAPEUTIC ACTIVITIES: CPT | Performed by: PHYSICAL THERAPIST

## 2018-03-14 PROCEDURE — 36415 COLL VENOUS BLD VENIPUNCTURE: CPT | Performed by: STUDENT IN AN ORGANIZED HEALTH CARE EDUCATION/TRAINING PROGRAM

## 2018-03-14 PROCEDURE — G8979 MOBILITY GOAL STATUS: HCPCS | Performed by: PHYSICAL THERAPIST

## 2018-03-14 PROCEDURE — 82962 GLUCOSE BLOOD TEST: CPT

## 2018-03-14 PROCEDURE — 80048 BASIC METABOLIC PNL TOTAL CA: CPT | Performed by: STUDENT IN AN ORGANIZED HEALTH CARE EDUCATION/TRAINING PROGRAM

## 2018-03-14 PROCEDURE — 86140 C-REACTIVE PROTEIN: CPT | Performed by: STUDENT IN AN ORGANIZED HEALTH CARE EDUCATION/TRAINING PROGRAM

## 2018-03-14 PROCEDURE — 74011250637 HC RX REV CODE- 250/637: Performed by: INTERNAL MEDICINE

## 2018-03-14 PROCEDURE — 65270000029 HC RM PRIVATE

## 2018-03-14 PROCEDURE — 85025 COMPLETE CBC W/AUTO DIFF WBC: CPT | Performed by: STUDENT IN AN ORGANIZED HEALTH CARE EDUCATION/TRAINING PROGRAM

## 2018-03-14 PROCEDURE — 97161 PT EVAL LOW COMPLEX 20 MIN: CPT | Performed by: PHYSICAL THERAPIST

## 2018-03-14 RX ADMIN — Medication 10 ML: at 21:26

## 2018-03-14 RX ADMIN — Medication 5 ML: at 15:32

## 2018-03-14 RX ADMIN — Medication 10 ML: at 06:04

## 2018-03-14 RX ADMIN — ACETAMINOPHEN 650 MG: 325 TABLET ORAL at 15:38

## 2018-03-14 RX ADMIN — SODIUM CHLORIDE 40 MG: 9 INJECTION, SOLUTION INTRAMUSCULAR; INTRAVENOUS; SUBCUTANEOUS at 08:58

## 2018-03-14 RX ADMIN — METRONIDAZOLE 500 MG: 500 INJECTION, SOLUTION INTRAVENOUS at 08:58

## 2018-03-14 RX ADMIN — METRONIDAZOLE 500 MG: 500 INJECTION, SOLUTION INTRAVENOUS at 21:25

## 2018-03-14 RX ADMIN — LEVOFLOXACIN 750 MG: 5 INJECTION, SOLUTION INTRAVENOUS at 06:03

## 2018-03-14 RX ADMIN — SODIUM CHLORIDE 40 MG: 9 INJECTION, SOLUTION INTRAMUSCULAR; INTRAVENOUS; SUBCUTANEOUS at 21:26

## 2018-03-14 RX ADMIN — HALOPERIDOL LACTATE 1 MG: 5 INJECTION, SOLUTION INTRAMUSCULAR at 06:03

## 2018-03-14 NOTE — PROGRESS NOTES
Pt is a 80 y.o.  female, admitted for colitis. Pt was unable to complete assessment, however, pt daughter was by bedside. Pt is known to reside at the [facility], and have been residing there for 3 yrs. Pt has also reside [facility]. Pt needs total assistance with ADLs, however, pt is able to feed herself. Pt is active with facility PCP: Dr. Home Belle, and she was seen Ron for physica;. Pt uses [facility]. Pt has DME at facility: wheelchair, walker, and transport chair. Pt is known to receive home health: At [facility], that recently begin. It was reported that pt will be seen by [facility] 2 x a week for PT/OT. Pt's daughter reported that she was informed that pt can not return back to assisted living facility, due to diagnosis. Pt's daughter stated that facility stated that it would be best to report to SNF. CM attempted contact with facility ([phone] or [phone]), and left a voicemail for Maureen Navarro (coordinator), in order to clarify pt's d/c needs and plans. Pt is willing for pt to return home with her, in case she can not return back to SNF. Care Management Interventions  PCP Verified by CM: Yes  Mode of Transport at Discharge:  Other (see comment) (pt's family will transport )  Transition of Care Consult (CM Consult): Discharge Planning, [facility]isted Living ([facility] )  Discharge Durable Medical Equipment: No  Physical Therapy Consult: Yes  Occupational Therapy Consult: Yes  Speech Therapy Consult: No  Current Support Network: Assisted Living ([facility] )  Confirm Follow Up Transport: Family  Plan discussed with Pt/Family/Caregiver: Yes  Discharge Location  Discharge Placement: Assisted Living ([facility] )    JOSSELYN Ochoa   [phone]

## 2018-03-14 NOTE — PROGRESS NOTES
*CM acknowledged consult*. CM sent referral to [facility] team in regards to pt's needs and care. CM will inform pt's nurse navigator. Trevor Eisenberg, JOSSELYN ADRIAN  [phone].

## 2018-03-14 NOTE — PROGRESS NOTES
Surgery    Denies abdominal pain. Afebrile, VSS. Alert, calm. Abdomen soft, non tender. Improved. Clear liquid diet.     Mc Santos MD

## 2018-03-14 NOTE — PROGRESS NOTES
Problem: Mobility Impaired (Adult and Pediatric)  Goal: *Acute Goals and Plan of Care (Insert Text)  Physical Therapy Goals  Initiated 3/14/2018  1. Patient will move from supine to sit and sit to supine  in bed with modified independence within 7 day(s). 2.  Patient will transfer from bed to chair and chair to bed with contact guard assist using the least restrictive device within 7 day(s). 3.  Patient will perform sit to stand with contact guard assist within 7 day(s). 4.  Patient will ambulate with minimal assistance/contact guard assist for 25 feet with the least restrictive device within 7 day(s). physical Therapy EVALUATION  Patient: Raven Desir (80 y.o. female)  Date: 3/14/2018  Primary Diagnosis: Colitis        Precautions: fall      ASSESSMENT :  Based on the objective data described below, the patient presents with impaired balance in standing, generalized weakness, decreased functional mobility skills. Upon arrival, patient asking to use bedside commode and is agreeable to getting out of bed. Bed mobility with min assist x 1. Good sitting balance EOB. Per patient's daughter, who is in room, patient has been wearing boot on right ankle and had just begun physical therapy. Boot is currently not available in room. Clarification from MD on weightbearing status and need of boot for right LE would be appreciated. Sit to stand with min assist x 1. Patient able to pivot, primarily on left LE with min assist x 1 from bed to bedside commode. After sitting about 5 minutes, patient ready to transfer to chair. Sit to stand with min assist x 1. Patient able to take a few steps from bedside commode to chair with min assist 1. Prior to admission, patient lived at SNF/assisted living and was receiving physical therapy. At discharge recommend return to assisted living and physical therapy for transfer/gait training.     Patient will benefit from skilled intervention to address the above impairments. Patients rehabilitation potential is considered to be Good  Factors which may influence rehabilitation potential include:   []         None noted  []         Mental ability/status  [x]         Medical condition  []         Home/family situation and support systems  []         Safety awareness  []         Pain tolerance/management  []         Other:      PLAN :  Recommendations and Planned Interventions:  [x]           Bed Mobility Training             []    Neuromuscular Re-Education  [x]           Transfer Training                   []    Orthotic/Prosthetic Training  [x]           Gait Training                         []    Modalities  [x]           Therapeutic Exercises           []    Edema Management/Control  []           Therapeutic Activities            []    Patient and Family Training/Education  []           Other (comment):    Frequency/Duration: Patient will be followed by physical therapy  3 times a week to address goals. Discharge Recommendations: Skilled Nursing Facility/Assisted Living  Further Equipment Recommendations for Discharge: none     SUBJECTIVE:   Patient stated I need to tinkle.  (Does not realize she is sitting on bedside commode.)    OBJECTIVE DATA SUMMARY:   HISTORY:    Past Medical History:   Diagnosis Date    [facility]     Diabetes mellitus [facility]     Diabetic acidosis, type II ([facility]     Hypertension     Hypothyroid     Hypothyroidism      Past Surgical History:   Procedure Laterality Date    HX APPENDECTOMY      HX CHOLECYSTECTOMY      HX HYSTERECTOMY       Prior Level of Function/Home Situation: patient admitted from SNF/Assited Living  Personal factors and/or comorbidities impacting plan of care: none noted (need weightbearing status/boot)    Home Situation  Home Environment: Skilled nursing facility  # Steps to Enter: 0  One/Two Story Residence: One story  Living Alone: No  Support Systems: Friends \ neighbors, Family member(s)  Patient Expects to be Discharged to[de-identified] Skilled nursing facility  Current DME Used/Available at Home: [employer], Sushma Nones    EXAMINATION/PRESENTATION/DECISION MAKING:   Critical Behavior:  Neurologic State: Alert, Confused  Orientation Level: Disoriented to place, Disoriented to situation, Disoriented to time, Oriented to person  Cognition: Impaired decision making, Memory loss, Poor safety awareness  Safety/Judgement: Awareness of environment  Hearing:   Auditory  Auditory Impairment: Hard of hearing, bilateral  Skin:  intact  Edema: none noted  Range Of Motion:  AROM: Generally decreased, functional           PROM: Generally decreased, functional           Strength:    Strength: Generally decreased, functional                    Tone & Sensation:   Tone: Normal              Sensation: Intact               Coordination:  Coordination: Within functional limits  Vision:      Functional Mobility:  Bed Mobility:  Rolling: Minimum assistance;Assist x1  Supine to Sit: Minimum assistance;Assist x1        Transfers:  Sit to Stand: Minimum assistance;Assist x1  Stand to Sit: Minimum assistance;Assist x1        Bed to Chair: Minimum assistance;Assist x1              Balance:   Sitting: Intact  Standing: Impaired  Standing - Static: Fair  Standing - Dynamic : Fair  Ambulation/Gait Training:  Distance (ft): 3 Feet (ft)  Assistive Device: Gait belt  Ambulation - Level of Assistance: Minimal assistance        Gait Abnormalities: Decreased step clearance           Stance: Right decreased     Step Length: Left shortened;Right shortened                                       Functional Measure:    Elder Mobility Scale    4/20         EMS and G-code impairment scale:  Percentage of impairment CH  0% CI  1-19% CJ  20-39% CK  40-59% CL  60-79% CM  80-99% CN  100%   EMS Score 0-20 20 17-19 13-16 9-12 5-8 1-4 0      Scores under 10  generally these patients are dependent in mobility maneuvers; require help with  basic ADL, such as transfers, toileting and dressing. Scores between 10  13  generally these patients are borderline in terms of safe mobility and  independence in ADL i.e. they require some help with some mobility maneuvers. Scores over 14  Generally these patients are able to perform mobility maneuvers alone and safely  and are independent in basic ADL. G codes: In compliance with CMSs Claims Based Outcome Reporting, the following G-code set was chosen for this patient based on their primary functional limitation being treated: The outcome measure chosen to determine the severity of the functional limitation was the Elder Mobility Scale with a score of 4/20 which was correlated with the impairment scale. ? Mobility - Walking and Moving Around:     - CURRENT STATUS: CM - 80%-99% impaired, limited or restricted    - GOAL STATUS: CL - 60%-79% impaired, limited or restricted    - D/C STATUS:  ---------------To be determined---------------      Physical Therapy Evaluation Charge Determination   History Examination Presentation Decision-Making   MEDIUM  Complexity : 1-2 comorbidities / personal factors will impact the outcome/ POC  LOW Complexity : 1-2 Standardized tests and measures addressing body structure, function, activity limitation and / or participation in recreation  LOW Complexity : Stable, uncomplicated  LOW Complexity : FOTO score of       Based on the above components, the patient evaluation is determined to be of the following complexity level: LOW     Pain:                    Activity Tolerance:   fair  Please refer to the flowsheet for vital signs taken during this treatment.   After treatment:   [x]         Patient left in no apparent distress sitting up in chair  []         Patient left in no apparent distress in bed  [x]         Call bell left within reach  [x]         Nursing notified  [x]         Caregiver present  [x]         Bed alarm activated    COMMUNICATION/EDUCATION:   The patients plan of care was discussed with: Registered Nurse. [x]         Fall prevention education was provided and the patient/caregiver indicated understanding. []         Patient/family have participated as able in goal setting and plan of care. [x]         Patient/family agree to work toward stated goals and plan of care. []         Patient understands intent and goals of therapy, but is neutral about his/her participation. []         Patient is unable to participate in goal setting and plan of care.     Thank you for this referral.  Christie Agee, PT   Time Calculation: 23 mins

## 2018-03-14 NOTE — PROGRESS NOTES
Hospitalist Progress Note    NAME: Sera Lynn   :  9/10/1922   MRN:  [id number]     Admit date: 3/11/2018    Today's date: 18    PCP: PROVIDER UNKNOWN        Assessment / Plan:  *looking better, still not tolerating orally as per usual  AXR also with decreased air  Poss home jose if tolerating well    Ischemic vs infectious colitis POA sigmoid and descending colon  ? Early SBO vs ileus POA no vomiting at home or in hospital  Leukocytosis POA no formal SIRS criteria  Hypotension POA  Lower GI bleed POA ? Related to ischemic colitis  chronic constipation POA  No NG tubes or surgery, she wants to conservative Rx, Fluids, antibiotics  Hospice consulted  Unclear if actually has SBO, not vomiting  NPO for now, repeat KUB if she will let us(refused my exam earlier)  Hold BP meds, permissive HTN.   IV Levaquin and Flagyl  H+H, no further bleeding    Acute kidney injury POA   Mild hyponatremia POA  Failure to thrive  Pt has not been eating and drinking well for past several months  IVF and monitor lytes     UTI POA  IV levaquin  (UA neg, prob after abx given)     Dementia   Hold PO meds   PRN IV Haldol for      Code Status: DDNR  Surrogate Decision Maker: Trinomissypablito   Discussed with pts dtr, she is willing to meet with hospice about overall goals of care, back to dementia unit, poss with hospice  DVT Prophylaxis: SCDs        Subjective:     Chief Complaint / Reason for Physician Visit  Sitting up in chair, dementia, looking bright today  In room with dtr, care plan discussed    Review of Systems:  Symptom Y/N Comments  Symptom Y/N Comments   Fever/Chills    Chest Pain n    Poor Appetite    Edema     Cough n   Abdominal Pain n    Sputum    Joint Pain     SOB/LORENZ n   Headache     Nausea/vomit n   Tolerating PT/OT     Diarrhea n   Tolerating Diet n NPO   Constipation    Other       Could NOT obtain due to:      Objective:     VITALS:   Last 24hrs VS reviewed since prior progress note. Most recent are:  Patient Vitals for the past 24 hrs:   Temp Pulse Resp BP SpO2   03/14/18 1122 98.1 °F (36.7 °C) 64 16 128/60 97 %   03/14/18 0727 - 66 18 129/53 95 %   03/13/18 2350 98.5 °F (36.9 °C) 62 17 117/43 94 %   03/13/18 2045 98.3 °F (36.8 °C) 61 18 122/46 95 %     No intake or output data in the 24 hours ending 03/14/18 1723     Wt Readings from Last 12 Encounters:   11/30/17 52 kg (114 lb 10.2 oz)   10/27/17 52 kg (114 lb 10.2 oz)   09/03/17 51.7 kg (114 lb)   05/28/17 52.1 kg (114 lb 13.8 oz)   01/06/17 52.2 kg (115 lb)   04/11/16 51.5 kg (113 lb 8.6 oz)   01/25/16 56.7 kg (125 lb)   12/20/15 45.4 kg (100 lb 1.4 oz)   09/22/12 45.4 kg (100 lb 1 oz)   09/19/12 48.1 kg (106 lb)   03/04/12 54.4 kg (120 lb)       PHYSICAL EXAM:  General: Alert , dementia severe  GI:  Soft, she cut my exam short while trying to palpate her abdomen  LN:  No cervical or inguinal BRIAN  Neurologic:  Alert, confused, normal speech, seemed to be moving her limbs well  Psych:   Poor insight. Not anxious nor agitated  Skin:  No rashes. No jaundice  Abd:   Soft, non tender, increased BS    Reviewed most current lab test results and cultures  YES  Reviewed most current radiology test results   YES  Review and summation of old records today    NO  Reviewed patient's current orders and MAR    YES  PMH/SH reviewed - no change compared to H&P  ________________________________________________________________________  Care Plan discussed with:    Comments   Patient y    Family  y    RN y    Care Manager     Consultant                        Multidiciplinary team rounds were held today with , nursing, pharmacist and clinical coordinator. Patient's plan of care was discussed; medications were reviewed and discharge planning was addressed.      ________________________________________________________________________  Total NON critical care TIME:  25   Minutes    Total CRITICAL CARE TIME Spent:   Minutes non procedure based      Comments   >50% of visit spent in counseling and coordination of care     ________________________________________________________________________  Jackie Evangelista MD     Procedures: see electronic medical records for all procedures/Xrays and details which were not copied into this note but were reviewed prior to creation of Plan. LABS:  I reviewed today's most current labs and imaging studies.   Pertinent labs include:  Recent Labs      03/14/18   0546  03/13/18 0239  03/11/18   2157   WBC  7.0  7.9  20.5*   HGB  10.7*  9.2*  12.2   HCT  34.7*  29.9*  38.0   PLT  245  216  261     Recent Labs      03/14/18   0546  03/13/18   0239  03/11/18 2223 03/11/18   2157   NA  140  140   --   135*   K  4.5  4.1   --   4.5   CL  110*  109*   --   100   CO2  21  24   --   28   GLU  41*  63*   --   222*   BUN  19  24*   --   36*   CREA  0.72  0.78   --   1.25*   CA  7.8*  7.4*   --   8.5   ALB   --   2.1*   --   3.0*   TBILI   --   0.4   --   0.5   SGOT   --   14*   --   16   ALT   --   9*   --   14   INR   --    --   1.1   --

## 2018-03-15 LAB
GLUCOSE BLD STRIP.AUTO-MCNC: 111 MG/DL (ref 65–100)
GLUCOSE BLD STRIP.AUTO-MCNC: 145 MG/DL (ref 65–100)
GLUCOSE BLD STRIP.AUTO-MCNC: 89 MG/DL (ref 65–100)
SERVICE CMNT-IMP: ABNORMAL
SERVICE CMNT-IMP: ABNORMAL
SERVICE CMNT-IMP: NORMAL

## 2018-03-15 PROCEDURE — 74011250637 HC RX REV CODE- 250/637: Performed by: SURGERY

## 2018-03-15 PROCEDURE — 65270000029 HC RM PRIVATE

## 2018-03-15 PROCEDURE — 74011250636 HC RX REV CODE- 250/636: Performed by: INTERNAL MEDICINE

## 2018-03-15 PROCEDURE — C9113 INJ PANTOPRAZOLE SODIUM, VIA: HCPCS | Performed by: INTERNAL MEDICINE

## 2018-03-15 PROCEDURE — 82962 GLUCOSE BLOOD TEST: CPT

## 2018-03-15 PROCEDURE — 74011000250 HC RX REV CODE- 250: Performed by: INTERNAL MEDICINE

## 2018-03-15 RX ORDER — LEVOTHYROXINE SODIUM 25 UG/1
50 TABLET ORAL
Status: DISCONTINUED | OUTPATIENT
Start: 2018-03-15 | End: 2018-03-20 | Stop reason: HOSPADM

## 2018-03-15 RX ORDER — LEVOFLOXACIN 750 MG/1
750 TABLET ORAL
Qty: 3 TAB | Refills: 0 | Status: SHIPPED | OUTPATIENT
Start: 2018-03-15 | End: 2018-03-19

## 2018-03-15 RX ORDER — METRONIDAZOLE 250 MG/1
500 TABLET ORAL EVERY 12 HOURS
Status: DISCONTINUED | OUTPATIENT
Start: 2018-03-15 | End: 2018-03-20 | Stop reason: HOSPADM

## 2018-03-15 RX ORDER — LEVOFLOXACIN 750 MG/1
750 TABLET ORAL
Status: DISCONTINUED | OUTPATIENT
Start: 2018-03-16 | End: 2018-03-20 | Stop reason: HOSPADM

## 2018-03-15 RX ORDER — METRONIDAZOLE 500 MG/1
500 TABLET ORAL 2 TIMES DAILY
Qty: 12 TAB | Refills: 0 | Status: SHIPPED | OUTPATIENT
Start: 2018-03-15 | End: 2018-03-19

## 2018-03-15 RX ORDER — PANTOPRAZOLE SODIUM 40 MG/1
40 TABLET, DELAYED RELEASE ORAL
Status: DISCONTINUED | OUTPATIENT
Start: 2018-03-15 | End: 2018-03-20 | Stop reason: HOSPADM

## 2018-03-15 RX ADMIN — SODIUM CHLORIDE 75 ML/HR: 900 INJECTION, SOLUTION INTRAVENOUS at 20:12

## 2018-03-15 RX ADMIN — LEVOTHYROXINE SODIUM 50 MCG: 25 TABLET ORAL at 22:08

## 2018-03-15 RX ADMIN — METRONIDAZOLE 500 MG: 500 INJECTION, SOLUTION INTRAVENOUS at 08:02

## 2018-03-15 RX ADMIN — SODIUM CHLORIDE 40 MG: 9 INJECTION, SOLUTION INTRAMUSCULAR; INTRAVENOUS; SUBCUTANEOUS at 08:02

## 2018-03-15 RX ADMIN — METRONIDAZOLE 500 MG: 250 TABLET ORAL at 22:08

## 2018-03-15 NOTE — PROGRESS NOTES
CM received voicemail from pt's daughter in regards to pt's d/c needs and plans. Pt's daughter informed CM that she is currently working with an placement advocate, in regards to her mother care and d/c needs. It was reported that she could possibly have an answer for placement on 3/17/18. CM contacted pt's facility and requested to speak with Emperatriz Gerber. CM was informed that Emperatriz Gerber was currently out of office, but spoke with Nursing coordinator. It was reported that pt will not be able to return to facility because she is no longer in the facility scope of practice. NGUYỄN requested a return call from Emperatriz Gerber in regards to pt's d/c plans, to advocate for pt and family, with releasing pt from facility.     JOSSELYN Musa   [phone]

## 2018-03-15 NOTE — PROGRESS NOTES
Surgery    Denies pain. Afebrile, VSS. Alert. Abdomen soft and non tender. Took some solid food yesterday. Improving.     Che Stack MD

## 2018-03-15 NOTE — PROGRESS NOTES
Physical Therapy  Attempted to see patient again for PT treatment but patient refusing any mobility or exercises. Will continue to follow.

## 2018-03-15 NOTE — PROGRESS NOTES
Hospitalist Progress Note    NAME: Abdiaziz Romero   :  9/10/1922   MRN:  [id number]     Admit date: 3/11/2018    Today's date: 03/15/18    PCP: PROVIDER UNKNOWN        Assessment / Plan:  *now awaiting placement as her previous facility is no longer accepting her    Ischemic vs infectious colitis POA sigmoid and descending colon  ? Early SBO vs ileus POA no vomiting at home or in hospital  Leukocytosis POA no formal SIRS criteria  Hypotension POA  Lower GI bleed POA ? Related to ischemic colitis  chronic constipation POA  No NG tubes or surgery, she wants to conservative Rx, Fluids, antibiotics  Hospice consulted  Unclear if actually has SBO, not vomiting  NPO for now, repeat KUB if she will let us(refused my exam earlier)  Hold BP meds, permissive HTN. po Levaquin and Flagyl  H+H, no further bleeding    Acute kidney injury POA   Mild hyponatremia POA  Failure to thrive  Pt has not been eating and drinking well for past several months  IVF and monitor lytes     UTI POA  IV levaquin  (UA neg, prob after abx given)     Dementia   Hold PO meds   PRN IV Haldol for      Code Status: DDNR  Surrogate Decision Maker: Anna   Discussed with pts dtr, she is willing to meet with hospice about overall goals of care, back to dementia unit, poss with hospice  DVT Prophylaxis: SCDs        Subjective:     Chief Complaint / Reason for Physician Visit  Lying in bed, looking well today    Review of Systems:  Symptom Y/N Comments  Symptom Y/N Comments   Fever/Chills    Chest Pain n    Poor Appetite    Edema     Cough n   Abdominal Pain n    Sputum    Joint Pain     SOB/LORENZ n   Headache     Nausea/vomit n   Tolerating PT/OT     Diarrhea n   Tolerating Diet n NPO   Constipation    Other       Could NOT obtain due to:      Objective:     VITALS:   Last 24hrs VS reviewed since prior progress note.  Most recent are:  Patient Vitals for the past 24 hrs:   Temp Pulse Resp BP SpO2   03/15/18 0919 96 °F (35.6 °C) 68 15 156/74 97 %   03/15/18 0130 98 °F (36.7 °C) 64 16 129/72 95 %   03/14/18 1930 98 °F (36.7 °C) 61 18 122/64 95 %   03/14/18 1122 98.1 °F (36.7 °C) 64 16 128/60 97 %       Intake/Output Summary (Last 24 hours) at 03/15/18 1008  Last data filed at 03/15/18 0307   Gross per 24 hour   Intake              250 ml   Output                0 ml   Net              250 ml        Wt Readings from Last 12 Encounters:   11/30/17 52 kg (114 lb 10.2 oz)   10/27/17 52 kg (114 lb 10.2 oz)   09/03/17 51.7 kg (114 lb)   05/28/17 52.1 kg (114 lb 13.8 oz)   01/06/17 52.2 kg (115 lb)   04/11/16 51.5 kg (113 lb 8.6 oz)   01/25/16 56.7 kg (125 lb)   12/20/15 45.4 kg (100 lb 1.4 oz)   09/22/12 45.4 kg (100 lb 1 oz)   09/19/12 48.1 kg (106 lb)   03/04/12 54.4 kg (120 lb)       PHYSICAL EXAM:  General: Alert , dementia severe  GI:  Soft, she cut my exam short while trying to palpate her abdomen  LN:  No cervical or inguinal BRIAN  Neurologic:  Alert, confused, normal speech, seemed to be moving her limbs well  Psych:   Poor insight. Not anxious nor agitated  Skin:  No rashes. No jaundice  Abd:   Soft, non tender, increased BS    Reviewed most current lab test results and cultures  YES  Reviewed most current radiology test results   YES  Review and summation of old records today    NO  Reviewed patient's current orders and MAR    YES  PMH/SH reviewed - no change compared to H&P  ________________________________________________________________________  Care Plan discussed with:    Comments   Patient y    Family  y    RN y    Care Manager     Consultant                        Multidiciplinary team rounds were held today with , nursing, pharmacist and clinical coordinator. Patient's plan of care was discussed; medications were reviewed and discharge planning was addressed.      ________________________________________________________________________  Total NON critical care TIME:  25   Minutes    Total CRITICAL CARE TIME Spent:   Minutes non procedure based      Comments   >50% of visit spent in counseling and coordination of care     ________________________________________________________________________  Aristeo Rose MD     Procedures: see electronic medical records for all procedures/Xrays and details which were not copied into this note but were reviewed prior to creation of Plan. LABS:  I reviewed today's most current labs and imaging studies.   Pertinent labs include:  Recent Labs      03/14/18   0546  03/13/18   0239   WBC  7.0  7.9   HGB  10.7*  9.2*   HCT  34.7*  29.9*   PLT  245  216     Recent Labs      03/14/18   0546  03/13/18   0239   NA  140  140   K  4.5  4.1   CL  110*  109*   CO2  21  24   GLU  41*  63*   BUN  19  24*   CREA  0.72  0.78   CA  7.8*  7.4*   ALB   --   2.1*   TBILI   --   0.4   SGOT   --   14*   ALT   --   9*

## 2018-03-15 NOTE — PROGRESS NOTES
NGUYỄN spoke with pt's daughter, via telephone: Reed Coronel and she reported that she had a chance to visit, two assisted living facilities on today: [facility] and [address]. [facility] [address] [facility] is currently working with Raven Johnson, and he is assisting with finding placement for pt. It was reported that [place]i plans to contact the state, due to previous facility d/c pt without warning from their facility. [facility] [address] [facility] reported that the earliest time that either facility can accept pt will be on 3/17/18, and both facilities are planning to visit pt in hospital for observation to verify if she is an appropriate candidate for their facility. [facility] [facility] is requesting a hospital bed and wheelchair for pt when she d/c from [facility], in order for her to have at new facility. NGUYỄN will inform MD, via FYI, and send pt's information to [facility] in regards to hospital bed and wheelchair. UPDATE: 3:06PM    NGUYỄN was informed from [facility]: assisted living facility reported that she can not accept pt back to facilty because pt is total assist.  [facility] reported that pt will benefit from SNF vs assisted living. Price recommends SNF: [facility], and [facility]    Pt is currently on a tele sitter and will need to be off of sitter for 24 hrs to be accepted to SNF.   NGUYỄN will update MD Matt Lemus, MSW CM  [phone]

## 2018-03-15 NOTE — PROGRESS NOTES
Problem: Falls - Risk of  Goal: *Absence of Falls  Document Rose Fall Risk and appropriate interventions in the flowsheet.    Outcome: Progressing Towards Goal  Fall Risk Interventions:  Mobility Interventions: Bed/chair exit alarm    Mentation Interventions: Bed/chair exit alarm    Medication Interventions: Bed/chair exit alarm, Patient to call before getting OOB    Elimination Interventions: Call light in reach, Patient to call for help with toileting needs    History of Falls Interventions: Bed/chair exit alarm

## 2018-03-15 NOTE — PROGRESS NOTES
Pharmacy IV to PO note:         Per protocol, the following medications have been switched to po  1. Levaquin 750 mg IV q 48 hr to 750 mg po q 48 hr  2. Metronidazole 500 mg IV q 12 hr to 500 mg po q 12 hr  3. Protonix 40 mg IV q 12 hr to 40 mg po BID  4.   Synthroid 37.5 mcg IV q 24 hr to synthroid 50 mcg po daily ac breakfast    Tara Meehan, CABRERAD

## 2018-03-15 NOTE — PROGRESS NOTES
Supervisor requested to removed telesitter out of room per gen surg charge nurse. Instructed charge nurse that patient's family wanted telesitter to remain in room. Supervisor removed telesitter from room. Manager, [employer], made aware.

## 2018-03-15 NOTE — PROGRESS NOTES
Physical Therapy  Attempted to see patient for PT treatment but patient refusing any mobility at this time, becoming agitated when asking if patient wanted to sit. Will attempt again later today.

## 2018-03-16 PROCEDURE — 74011250637 HC RX REV CODE- 250/637: Performed by: SURGERY

## 2018-03-16 PROCEDURE — 65270000029 HC RM PRIVATE

## 2018-03-16 PROCEDURE — 86580 TB INTRADERMAL TEST: CPT | Performed by: STUDENT IN AN ORGANIZED HEALTH CARE EDUCATION/TRAINING PROGRAM

## 2018-03-16 PROCEDURE — 74011000302 HC RX REV CODE- 302: Performed by: STUDENT IN AN ORGANIZED HEALTH CARE EDUCATION/TRAINING PROGRAM

## 2018-03-16 RX ADMIN — LEVOFLOXACIN 750 MG: 750 TABLET, FILM COATED ORAL at 06:04

## 2018-03-16 RX ADMIN — PANTOPRAZOLE SODIUM 40 MG: 40 TABLET, DELAYED RELEASE ORAL at 09:03

## 2018-03-16 RX ADMIN — PANTOPRAZOLE SODIUM 40 MG: 40 TABLET, DELAYED RELEASE ORAL at 16:22

## 2018-03-16 RX ADMIN — LEVOTHYROXINE SODIUM 50 MCG: 25 TABLET ORAL at 09:03

## 2018-03-16 RX ADMIN — TUBERCULIN PURIFIED PROTEIN DERIVATIVE 5 UNITS: 5 INJECTION, SOLUTION INTRADERMAL at 16:23

## 2018-03-16 RX ADMIN — METRONIDAZOLE 500 MG: 250 TABLET ORAL at 09:03

## 2018-03-16 RX ADMIN — METRONIDAZOLE 500 MG: 250 TABLET ORAL at 20:40

## 2018-03-16 NOTE — PROGRESS NOTES
Spiritual Care Partner Volunteer visited patient on 3/16/18. Documented by:    Sam Albright M.S.   Spiritual Care Department  If needs arise please call Jamie-CLARISSA ([phone])

## 2018-03-16 NOTE — PROGRESS NOTES
Physical Therapy Note:    Chart reviewed, discussed with RN, PT treatment attempted and deferred again this date. Pt cleared for PT, asleep on PT arrival, and daughter present. Daughter advising pt not be aroused for attempted PT treatment at this time. Will continue to follow per POC.

## 2018-03-16 NOTE — PROGRESS NOTES
Hospitalist Progress Note    NAME: Kim Cotton   :  9/10/1922   MRN:  [id number]     Admit date: 3/11/2018    Today's date: 18    PCP: PROVIDER UNKNOWN        Assessment / Plan:    *Much better  SNF on Monday, with Hospice    Ischemic vs infectious colitis POA sigmoid and descending colon  ? Early SBO vs ileus POA no vomiting at home or in hospital  Leukocytosis POA no formal SIRS criteria  Hypotension POA  Lower GI bleed POA ? Related to ischemic colitis  chronic constipation POA  No NG tubes or surgery, she wants to conservative Rx, Fluids, antibiotics  Hospice consulted  Unclear if actually has SBO, not vomiting  NPO for now, repeat KUB if she will let us(refused my exam earlier)  Hold BP meds, permissive HTN. po Levaquin and Flagyl  H+H, no further bleeding    Acute kidney injury POA   Mild hyponatremia POA  Failure to thrive  Pt has not been eating and drinking well for past several months  IVF and monitor lytes     UTI POA  IV levaquin  (UA neg, prob after abx given)     Dementia   Hold PO meds   PRN IV Haldol for      Code Status: DDNR  Surrogate Decision Maker: Anna   Discussed with pts dtr, she is willing to meet with hospice about overall goals of care, back to dementia unit, poss with hospice  DVT Prophylaxis: SCDs        Subjective:     Chief Complaint / Reason for Physician Visit  Lying in bed quietly, staring at me    Review of Systems:  Symptom Y/N Comments  Symptom Y/N Comments   Fever/Chills    Chest Pain n    Poor Appetite    Edema     Cough n   Abdominal Pain n    Sputum    Joint Pain     SOB/LORENZ n   Headache     Nausea/vomit n   Tolerating PT/OT     Diarrhea n   Tolerating Diet n NPO   Constipation    Other       Could NOT obtain due to:      Objective:     VITALS:   Last 24hrs VS reviewed since prior progress note.  Most recent are:  Patient Vitals for the past 24 hrs:   Temp Pulse Resp BP SpO2   18 1129 - 64 14 143/70 95 %   18 0800 97.9 °F (36.6 °C) 66 14 147/65 95 %   03/15/18 1545 98 °F (36.7 °C) 66 18 145/72 98 %   03/15/18 1203 - 65 18 157/86 96 %       Intake/Output Summary (Last 24 hours) at 03/16/18 1148  Last data filed at 03/16/18 0758   Gross per 24 hour   Intake          7608.75 ml   Output                0 ml   Net          7608.75 ml        Wt Readings from Last 12 Encounters:   11/30/17 52 kg (114 lb 10.2 oz)   10/27/17 52 kg (114 lb 10.2 oz)   09/03/17 51.7 kg (114 lb)   05/28/17 52.1 kg (114 lb 13.8 oz)   01/06/17 52.2 kg (115 lb)   04/11/16 51.5 kg (113 lb 8.6 oz)   01/25/16 56.7 kg (125 lb)   12/20/15 45.4 kg (100 lb 1.4 oz)   09/22/12 45.4 kg (100 lb 1 oz)   09/19/12 48.1 kg (106 lb)   03/04/12 54.4 kg (120 lb)       PHYSICAL EXAM:  General: Quiet, dementia severe  GI:  Soft, she cut my exam short while trying to palpate her abdomen  LN:  No cervical or inguinal BRIAN  Neurologic:  Alert, confused, normal speech, seemed to be moving her limbs well  Psych:   Poor insight. Not anxious nor agitated  Skin:  No rashes. No jaundice  Abd:   Soft, non tender, increased BS    Reviewed most current lab test results and cultures  YES  Reviewed most current radiology test results   YES  Review and summation of old records today    NO  Reviewed patient's current orders and MAR    YES  PMH/ reviewed - no change compared to H&P  ________________________________________________________________________  Care Plan discussed with:    Comments   Patient y    Family  y    RN y    Care Manager     Consultant                        Multidiciplinary team rounds were held today with , nursing, pharmacist and clinical coordinator. Patient's plan of care was discussed; medications were reviewed and discharge planning was addressed.      ________________________________________________________________________  Total NON critical care TIME:  25   Minutes    Total CRITICAL CARE TIME Spent:   Minutes non procedure based      Comments >50% of visit spent in counseling and coordination of care     ________________________________________________________________________  Nicole Landa MD     Procedures: see electronic medical records for all procedures/Xrays and details which were not copied into this note but were reviewed prior to creation of Plan. LABS:  I reviewed today's most current labs and imaging studies.   Pertinent labs include:  Recent Labs      03/14/18   0546   WBC  7.0   HGB  10.7*   HCT  34.7*   PLT  245     Recent Labs      03/14/18   0546   NA  140   K  4.5   CL  110*   CO2  21   GLU  41*   BUN  19   CREA  0.72   CA  7.8*

## 2018-03-16 NOTE — PROGRESS NOTES
Patient pulled IV out with cath tip intact, will not restart at time due to agititation, gown changed.

## 2018-03-16 NOTE — PROGRESS NOTES
Pt received a PPD test on right forearm. Skin is marked with special pen. Please read in 24 hrs tomorrow 3/17/18 @ 6935.

## 2018-03-16 NOTE — PROGRESS NOTES
Chart reviewed. Pt supine in bed with HOB raised sleeping, awakens to light sternal rub. Pt declining OOB activity at this time. .. \"I'll just nap now. .. Come back later. .. I'm cold. .. Go ramble to someone else. \"  Pt confused. Pt unable to comprehend education provided regarding importance of up OOB and PT tx session. Nurse notified.

## 2018-03-16 NOTE — PROGRESS NOTES
General Surgery End of Shift Nursing Note    Bedside shift change report given to Tye Ferrera (oncoming nurse) by Elliott Telles. Sonia Galdamez RN (offgoing nurse). Report included the following information SBAR, Kardex, Intake/Output, MAR and Recent Results. Shift worked:   7a to 7p   Summary of shift:    Tolerating the day   Issues for physician to address:   none     Number times ambulated in hallway past shift: 0,  Refused PT  Number of times OOB to chair past shift: 0    Pain Management:  Current medication: none  Patient states pain is manageable on current pain medication: YES    GI:    Current diet:  DIET GI LITE (POST SURGICAL)  DIET NUTRITIONAL SUPPLEMENTS No; Breakfast, Lunch, Dinner; Tian Arreaga current diet: YES  Passing flatus: YES  Last Bowel Movement: today   Appearance: soft    Respiratory:    Incentive Spirometer at bedside: YES  Patient instructed on use: YES    Patient Safety:    Falls Score: 5  Bed Alarm On? Yes  Sitter?  No    Carlie Torres RN

## 2018-03-16 NOTE — PROGRESS NOTES
Nars made q 2 hours no new changes in assessment status noted, patient refusing to take her a.m. ABX. Will attempt administration later.

## 2018-03-16 NOTE — PROGRESS NOTES
CM informed that pt's DME has been accepted for wheelchair and hospital for d/c needs, provided by [facility]. CM will inform MD, via [place] and pt's daughter: Arminda Erickson (telephone). CM is still working on finding placement.   CM informed that tele sitter d/c.    CM spoke with pt's daughter, and was informed that pt have been informed that she was accepted to [facility] ([phone])    Trevor Service, MSW NGUYỄN  [phone]

## 2018-03-17 LAB
GLUCOSE BLD STRIP.AUTO-MCNC: 180 MG/DL (ref 65–100)
GLUCOSE BLD STRIP.AUTO-MCNC: 189 MG/DL (ref 65–100)
SERVICE CMNT-IMP: ABNORMAL
SERVICE CMNT-IMP: ABNORMAL

## 2018-03-17 PROCEDURE — 65270000029 HC RM PRIVATE

## 2018-03-17 PROCEDURE — 74011250637 HC RX REV CODE- 250/637: Performed by: SURGERY

## 2018-03-17 PROCEDURE — 74011250637 HC RX REV CODE- 250/637: Performed by: INTERNAL MEDICINE

## 2018-03-17 PROCEDURE — 82962 GLUCOSE BLOOD TEST: CPT

## 2018-03-17 RX ADMIN — METRONIDAZOLE 500 MG: 250 TABLET ORAL at 20:31

## 2018-03-17 RX ADMIN — LEVOTHYROXINE SODIUM 50 MCG: 25 TABLET ORAL at 06:58

## 2018-03-17 RX ADMIN — METRONIDAZOLE 500 MG: 250 TABLET ORAL at 10:26

## 2018-03-17 RX ADMIN — ACETAMINOPHEN 650 MG: 325 TABLET ORAL at 20:31

## 2018-03-17 RX ADMIN — PANTOPRAZOLE SODIUM 40 MG: 40 TABLET, DELAYED RELEASE ORAL at 06:58

## 2018-03-17 RX ADMIN — PANTOPRAZOLE SODIUM 40 MG: 40 TABLET, DELAYED RELEASE ORAL at 18:25

## 2018-03-17 NOTE — PROGRESS NOTES
Hospitalist Progress Note    NAME: Nechama Alpers   :  9/10/1922   MRN:  [id number]     Admit date: 3/11/2018    Today's date: 18    PCP: PROVIDER UNKNOWN        Assessment / Plan:    Ischemic vs infectious colitis POA sigmoid and descending colon  ? Early SBO vs ileus POA no vomiting at home or in hospital  Leukocytosis POA  Hypotension POA resolved   Lower GI bleed POA ? Related to ischemic colitis  chronic constipation POA  wants to conservative Rx  Cont  Levaquin and Flagyl  consv mgmt recommended by surgery  She is now having formed bowel moments  On GI lite diet  Possible hospice evaluation on Monday and transfer to SNF with hospice on Monday as requested by family    Acute kidney injury POA   Mild hyponatremia POA  Failure to thrive  Pt has not been eating and drinking well for past several months  IVF and monitor lytes  Cr is normal  Bmp in am     UTI POA  Urine cx negative  (UA neg, prob after abx given)     Dementia   PRN IV Haldol for      Code Status: DDNR  Surrogate Decision Maker: Daughter    Discussed with pts dtr, she is willing to meet with hospice about overall goals of care, back to dementia unit, poss with hospice    DVT Prophylaxis: SCDs        Subjective:     Chief Complaint / Reason for Physician Visit  Poor historian. Having bowel moments. Can not tell me if she has abdominal pain. Review of Systems:  Symptom Y/N Comments  Symptom Y/N Comments   Fever/Chills    Chest Pain     Poor Appetite    Edema     Cough    Abdominal Pain     Sputum    Joint Pain     SOB/LORENZ    Headache     Nausea/vomit    Tolerating PT/OT     Diarrhea    Tolerating Diet n    Constipation    Other       Could NOT obtain due to: confused     Objective:     VITALS:   Last 24hrs VS reviewed since prior progress note.  Most recent are:  Patient Vitals for the past 24 hrs:   Temp Pulse Resp BP SpO2   18 1210 98.6 °F (37 °C) 77 14 128/50 97 %   18 0737 98.5 °F (36.9 °C) 67 15 151/70 97 %   03/17/18 0313 98.2 °F (36.8 °C) 72 14 154/84 94 %   03/17/18 0053 - 72 16 153/70 97 %   03/16/18 2000 - 76 - 150/75 -       Intake/Output Summary (Last 24 hours) at 03/17/18 1530  Last data filed at 03/16/18 1727   Gross per 24 hour   Intake              120 ml   Output                0 ml   Net              120 ml        Wt Readings from Last 12 Encounters:   11/30/17 52 kg (114 lb 10.2 oz)   10/27/17 52 kg (114 lb 10.2 oz)   09/03/17 51.7 kg (114 lb)   05/28/17 52.1 kg (114 lb 13.8 oz)   01/06/17 52.2 kg (115 lb)   04/11/16 51.5 kg (113 lb 8.6 oz)   01/25/16 56.7 kg (125 lb)   12/20/15 45.4 kg (100 lb 1.4 oz)   09/22/12 45.4 kg (100 lb 1 oz)   09/19/12 48.1 kg (106 lb)   03/04/12 54.4 kg (120 lb)       PHYSICAL EXAM:  General: drowsy  GI:  Soft, she cut my exam short while trying to palpate her abdomen  LN:  No cervical or inguinal BRIAN  Neurologic:  Alert, confused, normal speech, seemed to be moving her limbs well  Psych:   Poor insight. Not anxious nor agitated  Skin:  No rashes. No jaundice  Abd:   Soft, non tender, BS +     Reviewed most current lab test results and cultures  YES  Reviewed most current radiology test results   YES  Review and summation of old records today    NO  Reviewed patient's current orders and MAR    YES  PMH/ reviewed - no change compared to H&P  ________________________________________________________________________  Care Plan discussed with:    Comments   Patient y    Family      RN y    Care Manager     Consultant                        Multidiciplinary team rounds were held today with , nursing, pharmacist and clinical coordinator. Patient's plan of care was discussed; medications were reviewed and discharge planning was addressed.      ________________________________________________________________________  Total NON critical care TIME:  25   Minutes    Total CRITICAL CARE TIME Spent:   Minutes non procedure based      Comments   >50% of visit spent in counseling and coordination of care     ________________________________________________________________________  Alexandria Dickson MD     Procedures: see electronic medical records for all procedures/Xrays and details which were not copied into this note but were reviewed prior to creation of Plan. LABS:  I reviewed today's most current labs and imaging studies. Pertinent labs include:  No results for input(s): WBC, HGB, HCT, PLT, HGBEXT, HCTEXT, PLTEXT, HGBEXT, HCTEXT, PLTEXT in the last 72 hours. No results for input(s): NA, K, CL, CO2, GLU, BUN, CREA, CA, MG, PHOS, ALB, TBIL, TBILI, SGOT, ALT, INR in the last 72 hours.     No lab exists for component: INREXT, INREXT

## 2018-03-18 PROCEDURE — 65270000029 HC RM PRIVATE

## 2018-03-18 PROCEDURE — 74011250637 HC RX REV CODE- 250/637: Performed by: SURGERY

## 2018-03-18 RX ADMIN — PANTOPRAZOLE SODIUM 40 MG: 40 TABLET, DELAYED RELEASE ORAL at 17:44

## 2018-03-18 RX ADMIN — LEVOTHYROXINE SODIUM 50 MCG: 25 TABLET ORAL at 06:35

## 2018-03-18 RX ADMIN — METRONIDAZOLE 500 MG: 250 TABLET ORAL at 12:04

## 2018-03-18 RX ADMIN — PANTOPRAZOLE SODIUM 40 MG: 40 TABLET, DELAYED RELEASE ORAL at 06:35

## 2018-03-18 RX ADMIN — LEVOFLOXACIN 750 MG: 750 TABLET, FILM COATED ORAL at 06:35

## 2018-03-18 RX ADMIN — METRONIDAZOLE 500 MG: 250 TABLET ORAL at 21:00

## 2018-03-18 RX ADMIN — Medication 10 ML: at 22:00

## 2018-03-18 NOTE — PROGRESS NOTES
Hospitalist Progress Note    NAME: Nechama Alpers   :  9/10/1922   MRN:  [id number]     Admit date: 3/11/2018    Today's date: 18    PCP: PROVIDER UNKNOWN        Assessment / Plan:    Ischemic vs infectious colitis POA sigmoid and descending colon  ? Early SBO vs ileus POA no vomiting at home or in hospital  Leukocytosis POA  Hypotension POA resolved   Lower GI bleed POA ? Related to ischemic colitis  chronic constipation POA  wants to conservative Rx  Cont  Levaquin and Flagyl  consv mgmt recommended by surgery  She is now having formed bowel moments  On GI lite diet  D/w DTR this am and she wants to inititae hospice evaluation considering over all situation and advanced age    Acute kidney injury POA   Mild hyponatremia POA  Failure to thrive  Pt has not been eating and drinking well for past several months  IVF and monitor lytes  She has been refusing labs on a consistent basis and dtr now wants to transition to hospice     UTI POA  Urine cx negative  (UA neg, prob after abx given)     Dementia   PRN IV Haldol for      Code Status: DDNR  Surrogate Decision Maker: Daughter        DVT Prophylaxis: SCDs        Subjective:     Chief Complaint / Reason for Physician Visit  She is sitting up in chair today  Poor historian  Does not report any abdominal pain. Review of Systems:  Symptom Y/N Comments  Symptom Y/N Comments   Fever/Chills    Chest Pain     Poor Appetite    Edema     Cough    Abdominal Pain     Sputum    Joint Pain     SOB/LORENZ    Headache     Nausea/vomit    Tolerating PT/OT     Diarrhea    Tolerating Diet n    Constipation    Other       Could NOT obtain due to: confused     Objective:     VITALS:   Last 24hrs VS reviewed since prior progress note.  Most recent are:  Patient Vitals for the past 24 hrs:   Temp Pulse Resp BP SpO2   18 1113 97.4 °F (36.3 °C) 79 16 122/53 -   18 0804 97.6 °F (36.4 °C) 68 14 120/62 -   18 0451 - 69 16 (!) 130/91 -   03/18/18 0055 98.4 °F (36.9 °C) 70 16 145/72 97 %   03/17/18 2029 98.5 °F (36.9 °C) 72 16 133/64 97 %   03/17/18 1520 98.8 °F (37.1 °C) 69 17 130/65 98 %     No intake or output data in the 24 hours ending 03/18/18 1504     Wt Readings from Last 12 Encounters:   11/30/17 52 kg (114 lb 10.2 oz)   10/27/17 52 kg (114 lb 10.2 oz)   09/03/17 51.7 kg (114 lb)   05/28/17 52.1 kg (114 lb 13.8 oz)   01/06/17 52.2 kg (115 lb)   04/11/16 51.5 kg (113 lb 8.6 oz)   01/25/16 56.7 kg (125 lb)   12/20/15 45.4 kg (100 lb 1.4 oz)   09/22/12 45.4 kg (100 lb 1 oz)   09/19/12 48.1 kg (106 lb)   03/04/12 54.4 kg (120 lb)       PHYSICAL EXAM:  General: alert  GI:  Soft, non tender  LN:  No cervical or inguinal BRIAN  Neurologic:  Alert, awake  Psych:   Poor insight. Not anxious nor agitated  Skin:  No rashes. No jaundice  Abd:   Soft, non tender, BS +     Reviewed most current lab test results and cultures  YES  Reviewed most current radiology test results   YES  Review and summation of old records today    NO  Reviewed patient's current orders and MAR    YES  PMH/ reviewed - no change compared to H&P  ________________________________________________________________________  Care Plan discussed with:    Comments   Patient y    Family      RN y    Care Manager     Consultant                        Multidiciplinary team rounds were held today with , nursing, pharmacist and clinical coordinator. Patient's plan of care was discussed; medications were reviewed and discharge planning was addressed.      ________________________________________________________________________  Total NON critical care TIME:  25   Minutes    Total CRITICAL CARE TIME Spent:   Minutes non procedure based      Comments   >50% of visit spent in counseling and coordination of care     ________________________________________________________________________  Korina Hernandez MD     Procedures: see electronic medical records for all procedures/Xrays and details which were not copied into this note but were reviewed prior to creation of Plan. LABS:  I reviewed today's most current labs and imaging studies. Pertinent labs include:  No results for input(s): WBC, HGB, HCT, PLT, HGBEXT, HCTEXT, PLTEXT, HGBEXT, HCTEXT, PLTEXT in the last 72 hours. No results for input(s): NA, K, CL, CO2, GLU, BUN, CREA, CA, MG, PHOS, ALB, TBIL, TBILI, SGOT, ALT, INR in the last 72 hours.     No lab exists for component: INREXT, INREXT

## 2018-03-18 NOTE — HOSPICE
[facility] RN consultation visit note. Order for hospice noted. History and events of this hospitalization reviewed. TC to daughter Elvin Sy and support given. Elvin Sy said she is \" ready to talk Hospice. \"     Elvin Sy prefers to meet in the morning close to 0930 so this is planned    Please call [phone] if questions or concerns    Jason Doherty RN [facility]

## 2018-03-19 PROCEDURE — 65270000029 HC RM PRIVATE

## 2018-03-19 PROCEDURE — 74011250637 HC RX REV CODE- 250/637: Performed by: SURGERY

## 2018-03-19 PROCEDURE — 74011250637 HC RX REV CODE- 250/637: Performed by: INTERNAL MEDICINE

## 2018-03-19 RX ORDER — LEVOFLOXACIN 750 MG/1
750 TABLET ORAL
Qty: 2 TAB | Refills: 0 | Status: SHIPPED | OUTPATIENT
Start: 2018-03-20 | End: 2018-03-24

## 2018-03-19 RX ORDER — METRONIDAZOLE 500 MG/1
500 TABLET ORAL 3 TIMES DAILY
Qty: 9 TAB | Refills: 0 | Status: SHIPPED | OUTPATIENT
Start: 2018-03-19 | End: 2018-03-22

## 2018-03-19 RX ADMIN — PANTOPRAZOLE SODIUM 40 MG: 40 TABLET, DELAYED RELEASE ORAL at 19:07

## 2018-03-19 RX ADMIN — ACETAMINOPHEN 650 MG: 325 TABLET ORAL at 19:07

## 2018-03-19 RX ADMIN — LEVOTHYROXINE SODIUM 50 MCG: 25 TABLET ORAL at 19:07

## 2018-03-19 RX ADMIN — METRONIDAZOLE 500 MG: 250 TABLET ORAL at 19:07

## 2018-03-19 NOTE — HOSPICE
[facility] follow up on consultation visit note    Order for consult noted. History and events of this hospitalization reviewed. In to meet with patient Raven and daughter [address]. Support given as [address] was tearful at times. She expressed that her sons are out of town and that her  passed suddenly 2 years ago. She does have friends yet the big decisions are left up to her to make    Hospice philosophy and scope of services presented. Questions and concerns addressed. [address] said friends have told her to interview several hospice groups so she would like for her mother to be discharged today and for her to then decide on a hospice company   Support given and advised we are here for her to answer any questions or concerns. .Thank you for asking us to share information about hospice services for this loved lady and her family.     Please call [phone] if questions or concerns    Jaymie Sylvester RN [facility]

## 2018-03-19 NOTE — PROGRESS NOTES
ALMA received a VM message from Roberto Tavarez with the [address] [place] [address] facility stating she would need state regulated documents prior to pt being discharge. ALMA returned phone call to Roberto Tavarez who stated she would fax the info to be completed. Roberto Tavarez also requested an order for a fall mat for the pt at the facility and an order for the W/C and hospital bed. SW did confirm that daughter is scheduled to meet with hospice today at [place] [id number] stated she would need to confirm with admin regarding hospice reg and who can service the pt if daughter decides to go with hospice. Roberto Tavarez to fax documents. ALMA received a call from Irish with [facility] reporting pt W/C and hospital bed to be delivered to the facility at 2:00PM.  If pt to go on hospice, this equipment may not be needed as hospice would have to order necessary equipment. Roberto Tavarez to confirm state regs on DME as well. ALMA will continue to follow and assist with additional discharge needs.      Diane Kovacs, MSW  Ext [phone]

## 2018-03-19 NOTE — PROGRESS NOTES
NGUYỄN was informed that pt will not be d/c on today due to information needed from assisted living facility. NGUYỄN will inform pt and family of the changes, and will resume d/c in the a.m.  MD is currently completing documentation, and will leave in pt's chart. NGUYỄN informed MD of fall mat script for assisted living facility.   NGUYỄN was informed that medical equipment, provided by [facility] has been delivered to assisted living facility at 10:00AM    JOSSELYN Bermudez   [phone]

## 2018-03-19 NOTE — PROGRESS NOTES
General Surgery End of Shift Nursing Note    Bedside shift change report given to Chloe RN (oncoming nurse) by Charly Roman RN (offgoing nurse). Report included the following information SBAR, Kardex, Intake/Output and MAR. Shift worked:  [id number]   Summary of shift:   Assisted back to bd this pm, verbalized no complaints. Refused am care, vitals and meds. Issues for physician to address: None     Number times ambulated in hallway past shift: 0    Number of times OOB to chair past shift: 0    Pain Management:  Current medication: No  Patient states pain is manageable on current pain medication: N/A    GI:    Current diet:  DIET GI LITE (POST SURGICAL)  DIET NUTRITIONAL SUPPLEMENTS No; Breakfast, Lunch, Dinner; Tian Arreaga current diet: YES  Passing flatus: YES  Last Bowel Movement: several days ago   Appearance: Unobserved    Respiratory:    Incentive Spirometer at bedside: NO  Patient instructed on use: NO    Patient Safety:    Falls Score: 3  Bed Alarm On? No  Sitter?  No    Christina Valadez

## 2018-03-19 NOTE — PROGRESS NOTES
Hospitalist Progress Note    NAME: Kim Soriano   :  9/10/1922   MRN:  [id number]     Admit date: 3/11/2018    Today's date: 18    PCP: PROVIDER UNKNOWN        Assessment / Plan:    Ischemic vs infectious colitis POA sigmoid and descending colon  ? Early SBO vs ileus POA no vomiting at home or in hospital  Leukocytosis POA  Hypotension POA resolved   Lower GI bleed POA ? Related to ischemic colitis  chronic constipation POA  Cont  Levaquin and Flagyl  consv mgmt recommended by surgery  She is now having formed bowel moments  On GI lite diet  D/w DTR and she wants hospice so will ask case mgmt to assist with hospice arrangements  Paper work will be completed and possible DC in am tomorrow    Acute kidney injury POA   Mild hyponatremia POA  Failure to thrive  Pt has not been eating and drinking well for past several months  She has been refusing labs, vitals and meds  D/w pt's dtr and does not want any iv lines forced on her and asked me to keep her just comfortable     UTI POA  Urine cx negative  (UA neg, prob after abx given)     Dementia   PRN IV Haldol for      Code Status: Comfort measures only  Surrogate Decision Maker: Daughter        DVT Prophylaxis: SCDs        Subjective:     Chief Complaint / Reason for Physician Visit  Pt has been refusing vitals, meds and lab work. Met with pt's dtr and decided on plan of care and she wants comfort measures only  And does not want anything that will cause her to be uncomfortable    Review of Systems:  Symptom Y/N Comments  Symptom Y/N Comments   Fever/Chills    Chest Pain     Poor Appetite    Edema     Cough    Abdominal Pain     Sputum    Joint Pain     SOB/LORENZ    Headache     Nausea/vomit    Tolerating PT/OT     Diarrhea    Tolerating Diet n    Constipation    Other       Could NOT obtain due to: confused     Objective:     VITALS:   Last 24hrs VS reviewed since prior progress note.  Most recent are:  Patient Vitals for the past 24 hrs:   Temp Pulse Resp BP SpO2   03/19/18 1126 97.3 °F (36.3 °C) 74 16 125/56 95 %   03/19/18 0740 97.5 °F (36.4 °C) 74 16 154/59 95 %   03/19/18 0732 97.3 °F (36.3 °C) 61 16 129/83 96 %   03/18/18 2020 98.4 °F (36.9 °C) 78 16 149/67 95 %       Intake/Output Summary (Last 24 hours) at 03/19/18 1651  Last data filed at 03/18/18 2020   Gross per 24 hour   Intake              120 ml   Output                0 ml   Net              120 ml        Wt Readings from Last 12 Encounters:   11/30/17 52 kg (114 lb 10.2 oz)   10/27/17 52 kg (114 lb 10.2 oz)   09/03/17 51.7 kg (114 lb)   05/28/17 52.1 kg (114 lb 13.8 oz)   01/06/17 52.2 kg (115 lb)   04/11/16 51.5 kg (113 lb 8.6 oz)   01/25/16 56.7 kg (125 lb)   12/20/15 45.4 kg (100 lb 1.4 oz)   09/22/12 45.4 kg (100 lb 1 oz)   09/19/12 48.1 kg (106 lb)   03/04/12 54.4 kg (120 lb)       PHYSICAL EXAM:  General: alert  GI:  Soft, non tender  LN:  No cervical or inguinal BRIAN  Neurologic:  Alert, awake  Psych:   Poor insight. Not anxious nor agitated  Skin:  No rashes. No jaundice  Abd:   Soft, non tender, BS +     Reviewed most current lab test results and cultures  YES  Reviewed most current radiology test results   YES  Review and summation of old records today    NO  Reviewed patient's current orders and MAR    YES  PMH/SH reviewed - no change compared to H&P  ________________________________________________________________________  Care Plan discussed with:    Comments   Patient y    Family      RN y    Care Manager     Consultant                        Multidiciplinary team rounds were held today with , nursing, pharmacist and clinical coordinator. Patient's plan of care was discussed; medications were reviewed and discharge planning was addressed.      ________________________________________________________________________  Total NON critical care TIME:  25   Minutes    Total CRITICAL CARE TIME Spent:   Minutes non procedure based      Comments >50% of visit spent in counseling and coordination of care     ________________________________________________________________________  Darryle Jubilee, MD     Procedures: see electronic medical records for all procedures/Xrays and details which were not copied into this note but were reviewed prior to creation of Plan. LABS:  I reviewed today's most current labs and imaging studies. Pertinent labs include:  No results for input(s): WBC, HGB, HCT, PLT, HGBEXT, HCTEXT, PLTEXT, HGBEXT, HCTEXT, PLTEXT in the last 72 hours. No results for input(s): NA, K, CL, CO2, GLU, BUN, CREA, CA, MG, PHOS, ALB, TBIL, TBILI, SGOT, ALT, INR in the last 72 hours.     No lab exists for component: INREXT, INREXT

## 2018-03-19 NOTE — PROGRESS NOTES
Spiritual Care Partner Volunteer visited patient in Gen Surg on March 19, 2018.   Documented by:  CHRISTOPHER Laguna, [facility], charles NUNEZ [facility] Paging Service  [phone])

## 2018-03-19 NOTE — PROGRESS NOTES
Problem: Falls - Risk of  Goal: *Absence of Falls  Document Rose Fall Risk and appropriate interventions in the flowsheet.    Outcome: Progressing Towards Goal  Fall Risk Interventions:  Mobility Interventions: Patient to call before getting OOB    Mentation Interventions: Bed/chair exit alarm    Medication Interventions: Patient to call before getting OOB    Elimination Interventions: Bed/chair exit alarm    History of Falls Interventions: Bed/chair exit alarm

## 2018-03-20 VITALS
HEART RATE: 69 BPM | OXYGEN SATURATION: 94 % | DIASTOLIC BLOOD PRESSURE: 60 MMHG | RESPIRATION RATE: 14 BRPM | TEMPERATURE: 97.4 F | SYSTOLIC BLOOD PRESSURE: 150 MMHG

## 2018-03-20 PROCEDURE — 74011250637 HC RX REV CODE- 250/637: Performed by: SURGERY

## 2018-03-20 RX ADMIN — PANTOPRAZOLE SODIUM 40 MG: 40 TABLET, DELAYED RELEASE ORAL at 08:10

## 2018-03-20 RX ADMIN — METRONIDAZOLE 500 MG: 250 TABLET ORAL at 10:34

## 2018-03-20 RX ADMIN — LEVOTHYROXINE SODIUM 50 MCG: 25 TABLET ORAL at 08:11

## 2018-03-20 NOTE — PROGRESS NOTES
General Surgery End of Shift Nursing Note    Bedside shift change report given to JEANNETTE Davis (oncoming nurse) by Jahaira Bravo RN (offgoing nurse). Report included the following information SBAR, Kardex, Intake/Output and MAR. Shift worked:  [id number]   Summary of shift:  Am care give but refused Levaquin. Issues for physician to address: None     Number times ambulated in hallway past shift: 0    Number of times OOB to chair past shift: 0    Pain Management:  Current medication: No  Patient states pain is manageable on current pain medication: N/A    GI:    Current diet:  DIET GI LITE (POST SURGICAL)  DIET NUTRITIONAL SUPPLEMENTS No; Breakfast, Lunch, Dinner; Tian Arreaga current diet: YES  Passing flatus: YES  Last Bowel Movement: several days ago   Appearance: Unobserved    Respiratory:    Incentive Spirometer at bedside: NO  Patient instructed on use: NO    Patient Safety:    Falls Score: 3  Bed Alarm On? No  Sitter?  No    Christina Valadez

## 2018-03-20 NOTE — DISCHARGE SUMMARY
Hospitalist Discharge Summary     Patient ID:  Woody Cueva  [id number]  63 y.o.  9/10/1922    PCP on record: PROVIDER UNKNOWN    Admit date: 3/11/2018  Discharge date and time: 3/20/2018      DISCHARGE DIAGNOSIS:  Ischemic vs infectious colitis POA sigmoid and descending colon  ? Early SBO vs ileus POA no vomiting at home or in hospital  Leukocytosis POA  Hypotension POA resolved   Lower GI bleed POA ? Related to ischemic colitis  chronic constipation POA  Cont  Levaquin and Flagyl  consv mgmt recommended by surgery  She is now having formed bowel moments  On GI lite diet  D/w DTR and she wants hospice . CM following. Paper work completed      Acute kidney injury POA   Mild hyponatremia POA  Failure to thrive  Pt has not been eating and drinking well for past several months  She has been refusing labs, vitals and meds  D/w pt's dtr and does not want any iv lines forced on her and asked me to keep her just comfortable     UTI POA  Urine cx negative  (UA neg, prob after abx given)      Dementia   stable      Code Status: Comfort measures only  Surrogate Decision Maker: Daughter           DVT Prophylaxis: SCDs      CONSULTATIONS:  IP CONSULT TO GENERAL SURGERY    Excerpted HPI from H&P of Edgar Anguiano MD:  CHIEF COMPLAINT: blood in stool     HISTORY OF PRESENT ILLNESS:     Raven Dela Cruz is a 80 y.o.  female who presents with noticing blood in stool at assisted living facility. As per daughter pt noted to have low blood pressure for past 1 week. Daughter reported chronic constipation. Daughter also reports pt has history of dementia and has not been eating well for past several months. Daughter denies noticing any nausea, vomiting. History is limited due to patient's dementia as pt unable to provide any meaningful information. In ED, pt noted to have CT with Colitis and ? SBO     We were asked to admit for work up and evaluation of the above problems.      ______________________________________________________________________  DISCHARGE SUMMARY/HOSPITAL COURSE:  for full details see H&P, daily progress notes, labs, consult notes. _______________________________________________________________________  Patient seen and examined by me on discharge day. Pertinent Findings:  Gen:    Not in distress  Chest: Clear lungs  CVS:   Regular rhythm. No edema  Abd:  Soft, not distended, not tender  Neuro:  Awake, moving all ext  _______________________________________________________________________  DISCHARGE MEDICATIONS:   Current Discharge Medication List      START taking these medications    Details   metroNIDAZOLE (FLAGYL) 500 mg tablet Take 1 Tab by mouth three (3) times daily for 3 days. Qty: 9 Tab, Refills: 0      levoFLOXacin (LEVAQUIN) 750 mg tablet Take 1 Tab by mouth every fourty-eight (48) hours for 4 days. Qty: 2 Tab, Refills: 0         CONTINUE these medications which have NOT CHANGED    Details   busPIRone (BUSPAR) 10 mg tablet Take 10 mg by mouth two (2) times a day. acetaminophen (TYLENOL) 325 mg tablet Take 2 Tabs by mouth every six (6) hours as needed for Pain. Qty: 20 Tab, Refills: 0      traMADol (ULTRAM) 50 mg tablet Take 1 Tab by mouth every six (6) hours as needed for Pain. Max Daily Amount: 200 mg. Qty: 20 Tab, Refills: 0      polyethylene glycol (MIRALAX) 17 gram packet Take 1 Packet by mouth two (2) times a day. Qty: 30 Each, Refills: 0      CALCIUM CARBONATE (OYSTER SHELL CALCIUM 500 PO) Take 500 mg by mouth daily. mirtazapine (REMERON) 15 mg tablet Take 15 mg by mouth nightly. Indications: appetite      amLODIPine (NORVASC) 10 mg tablet Take 10 mg by mouth daily. docusate sodium (COLACE) 100 mg capsule Take 100 mg by mouth daily. levothyroxine (SYNTHROID) 50 mcg tablet Take 50 mcg by mouth Daily (before breakfast). cyanocobalamin (VITAMIN B-12) 1,000 mcg tablet Take 1,000 mcg by mouth daily. STOP taking these medications       magnesium hydroxide (BURNS MILK OF MAGNESIA) 400 mg/5 mL suspension Comments:   Reason for Stopping:         Mineral Oil drop Comments:   Reason for Stopping:         risperiDONE (RISPERDAL) 0.25 mg tablet Comments:   Reason for Stopping:         ergocalciferol (VITAMIN D2) 50,000 unit capsule Comments:   Reason for Stopping:         vitamin e (E GEMS) 1,000 unit capsule Comments:   Reason for Stopping:         lisinopril (PRINIVIL, ZESTRIL) 10 mg tablet Comments:   Reason for Stopping:         aspirin 81 mg chewable tablet Comments:   Reason for Stopping:               My Recommended Diet, Activity, Wound Care, and follow-up labs are listed in the patient's Discharge Insturctions which I have personally completed and reviewed.     ______________________________________________________________________    Risk of deterioration: Low    Condition at Discharge:  Stable  ______________________________________________________________________    Disposition  SNF/LTC  ______________________________________________________________________    Care Plan discussed with:   Patient, Family, RN, Care Manager, Consultant    ______________________________________________________________________    Code Status: DNR  ______________________________________________________________________      Follow up with:   PCP : PROVIDER UNKNOWN  Follow-up Information     Follow up With Details Comments Contact Info    [facility]  On 3/16/2018 [facility] [phone]    Provider Unknown   Patient not available to ask      Hospice upon arrival                 Total time in minutes spent coordinating this discharge (includes going over instructions, follow-up, prescriptions, and preparing report for sign off to her PCP) :  45 minutes    Signed:  Perico Muir MD

## 2018-03-20 NOTE — PROGRESS NOTES
NGUYỄN is currently getting documents for pt's admission to assisted living facility completed, MD will have to sign requested forms. NGUYỄN will fax documents to facility once completed.     Dianah Severs, MSW CM  [phone]